# Patient Record
Sex: MALE | Race: WHITE | Employment: OTHER | URBAN - METROPOLITAN AREA
[De-identification: names, ages, dates, MRNs, and addresses within clinical notes are randomized per-mention and may not be internally consistent; named-entity substitution may affect disease eponyms.]

---

## 2018-07-12 ENCOUNTER — TRANSFERRED RECORDS (OUTPATIENT)
Dept: HEALTH INFORMATION MANAGEMENT | Facility: CLINIC | Age: 83
End: 2018-07-12

## 2018-07-12 LAB
ALT SERPL-CCNC: 146 (ref ?–7)
AST SERPL-CCNC: 47 U/L (ref 5–34)
CREAT SERPL-MCNC: 1.8 MG/DL (ref 0.6–1.3)
GFR SERPL CREATININE-BSD FRML MDRD: 36 ML/MIN/1.73M2
GLUCOSE SERPL-MCNC: 128 MG/DL (ref 70–99)

## 2018-09-06 ENCOUNTER — TRANSFERRED RECORDS (OUTPATIENT)
Dept: HEALTH INFORMATION MANAGEMENT | Facility: CLINIC | Age: 83
End: 2018-09-06

## 2018-09-11 LAB — TSH SERPL-ACNC: 2.61 UIU/ML (ref 0.49–4.67)

## 2018-09-21 ENCOUNTER — TRANSFERRED RECORDS (OUTPATIENT)
Dept: HEALTH INFORMATION MANAGEMENT | Facility: CLINIC | Age: 83
End: 2018-09-21

## 2018-09-21 LAB
ALT SERPL-CCNC: 53 U/L (ref 7–55)
AST SERPL-CCNC: 40 U/L (ref 5–34)
CREAT SERPL-MCNC: 1.3 MG/DL (ref 0.6–1.3)
GFR SERPL CREATININE-BSD FRML MDRD: 53 ML/MIN/1.73M2
GLUCOSE SERPL-MCNC: 101 MG/DL (ref 70–99)
POTASSIUM SERPL-SCNC: 4.4 MMOL/L (ref 3.6–5.5)

## 2018-09-24 ENCOUNTER — TRANSFERRED RECORDS (OUTPATIENT)
Dept: HEALTH INFORMATION MANAGEMENT | Facility: CLINIC | Age: 83
End: 2018-09-24

## 2018-10-02 ENCOUNTER — TRANSFERRED RECORDS (OUTPATIENT)
Dept: HEALTH INFORMATION MANAGEMENT | Facility: CLINIC | Age: 83
End: 2018-10-02

## 2018-10-05 ENCOUNTER — CARE COORDINATION (OUTPATIENT)
Dept: GASTROENTEROLOGY | Facility: CLINIC | Age: 83
End: 2018-10-05

## 2018-10-05 ENCOUNTER — MEDICAL CORRESPONDENCE (OUTPATIENT)
Dept: HEALTH INFORMATION MANAGEMENT | Facility: CLINIC | Age: 83
End: 2018-10-05

## 2018-10-05 ENCOUNTER — TRANSFERRED RECORDS (OUTPATIENT)
Dept: HEALTH INFORMATION MANAGEMENT | Facility: CLINIC | Age: 83
End: 2018-10-05

## 2018-10-05 ENCOUNTER — TELEPHONE (OUTPATIENT)
Dept: GASTROENTEROLOGY | Facility: CLINIC | Age: 83
End: 2018-10-05

## 2018-10-05 DIAGNOSIS — K86.89 PANCREATIC MASS: Primary | ICD-10-CM

## 2018-10-05 NOTE — PROGRESS NOTES
Advanced Endoscopy     Referring provider:  Dr. Jesus Alberto Nova with [facility]     Clinic contact - MARY Kelly [phone]    Referred to: Advanced Endoscopy Provider Group     Provider Requested: Any     Referral Received: 10/5/2018     Records received: 10/5/2018     Images received: NO images received.     Evaluation for:  Spyglass ERCP for Tissue Diagnosis / Stenting. Abnormal finding in image.      Clinical History (per RN review):   83-year-old with a history of night sweats, elevated LFTs, elevated alk phos level, essential hypertension.  9/21/2018: Annual physical with Dr. Blayne Wei  -Reported recurring episodes of chills and sweats which he has been seen for a couple of times over the past year.  The last episode of night sweats lasted for 5-6 days.  The sweats were associated with some lower chest pain, along with some pain into the back on both sides.  Labs were remarkable for elevated LFTs, especially the alk phos.  Transaminases are just mildly up.  He is status post cholecystectomy from 1990s.    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  Ampulla appeared to be bulging.  Despite multiple cannulation attempts, angling sphincterotome cephalad, and needle-knife sphincterotomy and could not be cannulated.  The endoscope was withdrawn.  The patient tolerated procedure well without complications.    Recommendations::  Refer to tertiary center for spyglass ERCP for tissue diagnosis/stenting.    10/2/2018 MRCP W WO contrast (Gad[facility]) - no images   Impression:  1.  Central obstructing mass at the confluence of the right and left hepatic ducts most concerning for cholangiocarcinoma.  2.  Intrahepatic bile duct dilation, left greater than right, there is associated hyperenhancement of the left hepatic lobe, likely inflammatory in the setting of biliary obstruction.  3.   Peripherally enhancing lesion hepatic segment IVB this could be enhancement surrounding a cyst or small biliary abscess.  Follow-up as needed to exclude underlying malignancy.  Small enhancing exophytic right renal lesion, I will small renal cell carcinoma is possible.  5.  Indeterminant subcapsular hypoenhancement in the peripheral right lobe, this could represent tiny subcapsular cyst, attention on follow-up is recommended.  6.  Tiny right hepatic lobe cyst.  7.  Enlarged portal lymph nodes, indeterminate.  8.  Left lower lobe pulmonary nodule.  9.  Moderate celiac, superior mesenteric and severe bilateral renal artery narrowing.  10.  Bilateral renal cysts.  11.  Prostatomegaly.    9/28/2018: MRCP without contrast (do not have images)  Findings: Abdominal MRCP demonstrates diffuse intrahepatic ductal dilation with abrupt termination at the bifurcation of the common hepatic duct.  No intraluminal filling defects appreciated.  In the absence of discrete mass or abnormal contrast enhancement, findings are suspicious for cholangiocarcinoma or Klatskin tumor.  Moderately enlarged josel uis hepatis and peripancreatic nodes are of unclear clinical significance.  A larger node along the anterior aspect of the pancreatic body measures 2.7 x 1.1 cm.  The liver, pancreas, spleen, adrenal glands, and kidneys are otherwise unremarkable except for a 9 mm left renal cyst.  A 1.1 cm nodule lesion is noted in the left lung base.    Impression:  1.  Diffuse intrahepatic ductal dilation with abrupt termination of the common hepatic duct bifurcation suspicious for Klatskin tumor.  ERCP with brushings recommended.  Abdominal MRI with and without contrast may be considered with delayed postcontrast sequences for demonstration of possible cholangiocarcinoma.  2.  A 1.1 cm nodular lesion is noted in the left lung base medially, recommend further characterization with thoracic computed tomography scan.      MD review date: 10/5/2018 routed  to Dr. Barraza as an urgent referral.   MD Decision for clinic consultation/Orders:   Plan: Accession outside MRCP MRI and request stat overread; then morning of procedure CT with IV contrast if renal function allows; then ERCP   See if I have room no later than next Tuesday; if I do not then let me know and we will figure something out  Per review of MRI in Patient images:    1. EUS/ERCP and over read of MRI's     Orders placed and sent to scheduling.        Referral updates/Patient contacted:   10/5/2018 1515: called referring office to ask where images are for this patient as intake states they were not pushed. Neither the nurse or the MD were in the office. Direct line left for them to call this RN.   10/8/2018: called referring office and left message for nurse to call this RN asap as this is an urgent referral and no images have been received.   - will need MRI sent for review.     Called and spoke to Tim and his wife.     Patient called and is amenable to plan: EUS/ERCP with Dr. Barraza and aware of the following:  Procedure explained to patient.  This is an /urgent procedure.     Can expect a call for date and time for procedure.   Will need a , someone to stay with them for 24 hours and stay in town for 24 hours (within 45 min of Hospital) post procedure, rational explained.   He had physical dated 9/23/2018 with Dr. Blayne Wei. Noted in EPIC.     Blood thinner -  N/A  ASA - N/A  Diabetic - N/A  NSAIDS: N/A    A pre-op nurse will call 1-2 days prior to the procedure.   Is advised to be NPO/solid food at midnight before the procedure in the event the procedure time is moved up. Ok to drink clear liquids (Water, Apple Juice or Gatorade) up to 6 hours prior to procedure.     Verbalized understanding of all instructions. All questions answered.   Contact information verified for future questions/concerns.       Sharon ROSENBERG, RN Coordinator  Dr. Bella, Dr. Barraza & Dr. Agosto    Advanced Endoscopy  [phone]

## 2018-10-05 NOTE — TELEPHONE ENCOUNTER
Health Call Center    Phone Message    May a detailed message be left on voicemail:   Unknown     Reason for Call: Other: Advanced Endoscopy Procedure Intake Form    Referring/Requesting provider and Health care System: Dr. Jesus Alberto Nova with [facility]    Clinic contact - MARY Kelly [phone]    Procedure Requested: Spyglass ERCP for Tissue Diagnosis / Stenting. Abnormal finding in image.     Is this an urgent referral 72 hours to 1 week: Yes    Requested provided:  N/A     Is this a procedure the provider does?    Yes    Has patient been evaluated in clinic or had a procedure Advance Endoscopy provider in the last 5 years:      Unknown    What is the procedure to evaluate for/Reason/Indication of procedure: Abnormal finding in image. Cholangiocarcinoma, Elevated alkaline phosphatase leven and Bile duct obstruction, intrahepatic.    Is procedure to rule out malignancy or is there concern for underlying malignancy (if yes, send messages as High priority):     Unknown    History and physical within last 30 days?     Unknown    CT scan     Unknown    MRI        Yes    Upper Endoscopy/EGD    Unknown    Endoscopic Ultrasound/EUS          Unknown    ERCP      Yes    Colonoscopy    Unknown    Are images able/being pushed to our system?     No    Was clinic informed to [employer] Urgent referral imaging overnight?(requested within 1 week)     No    Is patient is aware of request for procedure and ok to be contacted to schedule?    Unknown    Action Taken: Message routed to:  [facility] ANNAMARIE

## 2018-10-07 NOTE — PROGRESS NOTES
Interesting that they are not referring for cannulation and further tissue sampling (rather than just tissue sampling) as they failed cannulation    Plan: Accession outside MRCP MRI and request stat overread; then morning of procedure CT with IV contrast if renal function allows; then ERCP    See if I have room no later than next Tuesday; if I do not then let me know and we will figure something out    Bravo Barraza MD PhD  Director of Endoscopy  Associate Professor of Medicine, Surgery and Pediatrics  Interventional and Therapeutic Endoscopy    [facility]  Division of Gastroenterology and Hepatology  [facility] [address]  [address] [address]    New Consultations  [phone]  Procedure Scheduling [phone]  Clinical Nurse Coordinator [phone]  Clinical Fax   [phone]  Administrative   [phone]  Administrative Fax  [phone]

## 2018-10-08 ENCOUNTER — TELEPHONE (OUTPATIENT)
Dept: GASTROENTEROLOGY | Facility: CLINIC | Age: 83
End: 2018-10-08

## 2018-10-08 ENCOUNTER — HOSPITAL ENCOUNTER (INPATIENT)
Dept: GENERAL RADIOLOGY | Facility: CLINIC | Age: 83
End: 2018-10-08
Attending: INTERNAL MEDICINE

## 2018-10-08 ENCOUNTER — ANESTHESIA EVENT (OUTPATIENT)
Dept: SURGERY | Facility: CLINIC | Age: 83
End: 2018-10-08
Payer: MEDICARE

## 2018-10-08 RX ORDER — METOPROLOL SUCCINATE 100 MG/1
50 TABLET, EXTENDED RELEASE ORAL 2 TIMES DAILY
COMMUNITY

## 2018-10-08 RX ORDER — AMLODIPINE BESYLATE 5 MG/1
5 TABLET ORAL DAILY
COMMUNITY

## 2018-10-08 RX ORDER — PANTOPRAZOLE SODIUM 40 MG/1
40 TABLET, DELAYED RELEASE ORAL
COMMUNITY

## 2018-10-08 RX ORDER — FUROSEMIDE 20 MG
20 TABLET ORAL
COMMUNITY

## 2018-10-08 RX ORDER — LOSARTAN POTASSIUM 100 MG/1
100 TABLET ORAL DAILY
COMMUNITY

## 2018-10-08 NOTE — TELEPHONE ENCOUNTER
Tim is informed that he is scheduled on 10/09/2018 at 220 P with an arrival time of 1220 P for an ERCP/EUS procedure with Dr. Barraza.  All instructions along with lodging information will be sent via E-mail to alma [place] 10/08/2018 @ 105 A

## 2018-10-08 NOTE — ANESTHESIA PREPROCEDURE EVALUATION
Anesthesia Evaluation     . Pt has had prior anesthetic. Type: General    No history of anesthetic complications          ROS/MED HX    ENT/Pulmonary:  - neg pulmonary ROS     Neurologic:  - neg neurologic ROS     Cardiovascular:     (+) hypertension----. : . . . :. .       METS/Exercise Tolerance:     Hematologic:  - neg hematologic  ROS       Musculoskeletal:  - neg musculoskeletal ROS       GI/Hepatic:     (+) GERD Asymptomatic on medication,       Renal/Genitourinary:     (+) chronic renal disease, type: CRI,       Endo:  - neg endo ROS       Psychiatric:         Infectious Disease:  - neg infectious disease ROS       Malignancy:   (+) Malignancy History of GI  GI CA Active status post,         Other:    - neg other ROS                 Physical Exam  Normal systems: cardiovascular, pulmonary and dental    Airway   Mallampati: II  TM distance: >3 FB  Neck ROM: full    Dental     Cardiovascular       Pulmonary                     Anesthesia Plan      History & Physical Review  History and physical reviewed and following examination; no interval change.    ASA Status:  2 .    NPO Status:  > 8 hours    Plan for General and RSI with Intravenous and Propofol induction. Maintenance will be Balanced.    PONV prophylaxis:  Ondansetron (or other 5HT-3) and Dexamethasone or Solumedrol       Postoperative Care  Postoperative pain management:  IV analgesics and Oral pain medications.      Consents  Anesthetic plan, risks, benefits and alternatives discussed with:  Patient, Spouse and Daughter/Son.  Use of blood products discussed: No .   .                          .

## 2018-10-09 ENCOUNTER — ANESTHESIA (OUTPATIENT)
Dept: SURGERY | Facility: CLINIC | Age: 83
End: 2018-10-09
Payer: MEDICARE

## 2018-10-09 ENCOUNTER — HOSPITAL ENCOUNTER (OUTPATIENT)
Facility: CLINIC | Age: 83
Setting detail: OBSERVATION
Discharge: HOME | End: 2018-10-10
Attending: INTERNAL MEDICINE | Admitting: INTERNAL MEDICINE
Payer: MEDICARE

## 2018-10-09 ENCOUNTER — APPOINTMENT (OUTPATIENT)
Dept: GENERAL RADIOLOGY | Facility: CLINIC | Age: 83
End: 2018-10-09
Attending: INTERNAL MEDICINE
Payer: MEDICARE

## 2018-10-09 DIAGNOSIS — K83.1 BILE DUCT STRICTURE (H): Primary | ICD-10-CM

## 2018-10-09 PROBLEM — Z98.890 POST-OPERATIVE STATE: Status: ACTIVE | Noted: 2018-10-09

## 2018-10-09 LAB
ALBUMIN SERPL-MCNC: 3.3 G/DL (ref 3.4–5)
ALP SERPL-CCNC: 275 U/L (ref 40–150)
ALT SERPL W P-5'-P-CCNC: 34 U/L (ref 0–70)
AMYLASE SERPL-CCNC: 62 U/L (ref 30–110)
ANION GAP SERPL CALCULATED.3IONS-SCNC: 8 MMOL/L (ref 3–14)
AST SERPL W P-5'-P-CCNC: 24 U/L (ref 0–45)
BILIRUB SERPL-MCNC: 0.6 MG/DL (ref 0.2–1.3)
BUN SERPL-MCNC: 14 MG/DL (ref 7–30)
CALCIUM SERPL-MCNC: 9.3 MG/DL (ref 8.5–10.1)
CHLORIDE SERPL-SCNC: 101 MMOL/L (ref 94–109)
CO2 SERPL-SCNC: 28 MMOL/L (ref 20–32)
CREAT SERPL-MCNC: 1.29 MG/DL (ref 0.66–1.25)
ERCP: NORMAL
ERYTHROCYTE [DISTWIDTH] IN BLOOD BY AUTOMATED COUNT: 13.6 % (ref 10–15)
GFR SERPL CREATININE-BSD FRML MDRD: 53 ML/MIN/1.7M2
GLUCOSE BLDC GLUCOMTR-MCNC: 89 MG/DL (ref 70–99)
GLUCOSE SERPL-MCNC: 105 MG/DL (ref 70–99)
HCT VFR BLD AUTO: 39.8 % (ref 40–53)
HGB BLD-MCNC: 13.2 G/DL (ref 13.3–17.7)
LIPASE SERPL-CCNC: 100 U/L (ref 73–393)
MCH RBC QN AUTO: 32.4 PG (ref 26.5–33)
MCHC RBC AUTO-ENTMCNC: 33.2 G/DL (ref 31.5–36.5)
MCV RBC AUTO: 98 FL (ref 78–100)
PLATELET # BLD AUTO: 285 10E9/L (ref 150–450)
POTASSIUM SERPL-SCNC: 4.3 MMOL/L (ref 3.4–5.3)
PROT SERPL-MCNC: 7.9 G/DL (ref 6.8–8.8)
RBC # BLD AUTO: 4.07 10E12/L (ref 4.4–5.9)
SODIUM SERPL-SCNC: 137 MMOL/L (ref 133–144)
WBC # BLD AUTO: 9 10E9/L (ref 4–11)

## 2018-10-09 PROCEDURE — 37000009 ZZH ANESTHESIA TECHNICAL FEE, EACH ADDTL 15 MIN: Performed by: INTERNAL MEDICINE

## 2018-10-09 PROCEDURE — 27210794 ZZH OR GENERAL SUPPLY STERILE: Performed by: INTERNAL MEDICINE

## 2018-10-09 PROCEDURE — 25000125 ZZHC RX 250: Performed by: INTERNAL MEDICINE

## 2018-10-09 PROCEDURE — C1877 STENT, NON-COAT/COV W/O DEL: HCPCS | Performed by: INTERNAL MEDICINE

## 2018-10-09 PROCEDURE — G0378 HOSPITAL OBSERVATION PER HR: HCPCS

## 2018-10-09 PROCEDURE — 00000146 ZZHCL STATISTIC GLUCOSE BY METER IP

## 2018-10-09 PROCEDURE — 71000014 ZZH RECOVERY PHASE 1 LEVEL 2 FIRST HR: Performed by: INTERNAL MEDICINE

## 2018-10-09 PROCEDURE — 40000277 XR SURGERY CARM FLUORO LESS THAN 5 MIN W STILLS: Mod: TC

## 2018-10-09 PROCEDURE — C1769 GUIDE WIRE: HCPCS | Performed by: INTERNAL MEDICINE

## 2018-10-09 PROCEDURE — 25000132 ZZH RX MED GY IP 250 OP 250 PS 637: Mod: GY | Performed by: PHYSICIAN ASSISTANT

## 2018-10-09 PROCEDURE — 25000128 H RX IP 250 OP 636: Performed by: STUDENT IN AN ORGANIZED HEALTH CARE EDUCATION/TRAINING PROGRAM

## 2018-10-09 PROCEDURE — 83690 ASSAY OF LIPASE: CPT | Performed by: INTERNAL MEDICINE

## 2018-10-09 PROCEDURE — A9270 NON-COVERED ITEM OR SERVICE: HCPCS | Mod: GY | Performed by: PHYSICIAN ASSISTANT

## 2018-10-09 PROCEDURE — 85027 COMPLETE CBC AUTOMATED: CPT | Performed by: INTERNAL MEDICINE

## 2018-10-09 PROCEDURE — 25000128 H RX IP 250 OP 636: Performed by: NURSE ANESTHETIST, CERTIFIED REGISTERED

## 2018-10-09 PROCEDURE — 80053 COMPREHEN METABOLIC PANEL: CPT | Performed by: INTERNAL MEDICINE

## 2018-10-09 PROCEDURE — 99225 ZZC SUBSEQUENT OBSERVATION CARE,LEVEL II: CPT | Performed by: PHYSICIAN ASSISTANT

## 2018-10-09 PROCEDURE — A9270 NON-COVERED ITEM OR SERVICE: HCPCS | Performed by: INTERNAL MEDICINE

## 2018-10-09 PROCEDURE — 25000125 ZZHC RX 250: Performed by: NURSE ANESTHETIST, CERTIFIED REGISTERED

## 2018-10-09 PROCEDURE — 88112 CYTOPATH CELL ENHANCE TECH: CPT | Performed by: INTERNAL MEDICINE

## 2018-10-09 PROCEDURE — 71000015 ZZH RECOVERY PHASE 1 LEVEL 2 EA ADDTL HR: Performed by: INTERNAL MEDICINE

## 2018-10-09 PROCEDURE — 40000170 ZZH STATISTIC PRE-PROCEDURE ASSESSMENT II: Performed by: INTERNAL MEDICINE

## 2018-10-09 PROCEDURE — 25000128 H RX IP 250 OP 636: Performed by: ANESTHESIOLOGY

## 2018-10-09 PROCEDURE — 36415 COLL VENOUS BLD VENIPUNCTURE: CPT | Performed by: INTERNAL MEDICINE

## 2018-10-09 PROCEDURE — 37000008 ZZH ANESTHESIA TECHNICAL FEE, 1ST 30 MIN: Performed by: INTERNAL MEDICINE

## 2018-10-09 PROCEDURE — 25000566 ZZH SEVOFLURANE, EA 15 MIN: Performed by: INTERNAL MEDICINE

## 2018-10-09 PROCEDURE — C1726 CATH, BAL DIL, NON-VASCULAR: HCPCS | Performed by: INTERNAL MEDICINE

## 2018-10-09 PROCEDURE — C1876 STENT, NON-COA/NON-COV W/DEL: HCPCS | Performed by: INTERNAL MEDICINE

## 2018-10-09 PROCEDURE — 82150 ASSAY OF AMYLASE: CPT | Performed by: INTERNAL MEDICINE

## 2018-10-09 PROCEDURE — 88305 TISSUE EXAM BY PATHOLOGIST: CPT | Performed by: INTERNAL MEDICINE

## 2018-10-09 PROCEDURE — 99207 ZZC CDG-CODE CATEGORY CHANGED: CPT | Performed by: PHYSICIAN ASSISTANT

## 2018-10-09 PROCEDURE — 36000059 ZZH SURGERY LEVEL 3 EA 15 ADDTL MIN UMMC: Performed by: INTERNAL MEDICINE

## 2018-10-09 PROCEDURE — 25000125 ZZHC RX 250: Performed by: STUDENT IN AN ORGANIZED HEALTH CARE EDUCATION/TRAINING PROGRAM

## 2018-10-09 PROCEDURE — 36000061 ZZH SURGERY LEVEL 3 W FLUORO 1ST 30 MIN - UMMC: Performed by: INTERNAL MEDICINE

## 2018-10-09 DEVICE — STENT JOHLIN PANCREA WEDGE 08.5FRX20CM WINTRO G26831
Type: IMPLANTABLE DEVICE | Site: BILE DUCT | Status: NON-FUNCTIONAL
Removed: 2018-11-05

## 2018-10-09 DEVICE — STENT JOHLIN PANCREA WEDGE 08.5FRX22CM WINTRO G26832
Type: IMPLANTABLE DEVICE | Site: BILE DUCT | Status: NON-FUNCTIONAL
Removed: 2018-11-05

## 2018-10-09 DEVICE — STENT FREEMAN PANCREA FLEX 4FRX11CM W/O FLANGE SGL PIGTAIL
Type: IMPLANTABLE DEVICE | Site: PANCREAS | Status: NON-FUNCTIONAL
Removed: 2018-11-05

## 2018-10-09 RX ORDER — LOSARTAN POTASSIUM 100 MG/1
100 TABLET ORAL DAILY
Status: DISCONTINUED | OUTPATIENT
Start: 2018-10-09 | End: 2018-10-10 | Stop reason: HOSPADM

## 2018-10-09 RX ORDER — SODIUM CHLORIDE, SODIUM LACTATE, POTASSIUM CHLORIDE, CALCIUM CHLORIDE 600; 310; 30; 20 MG/100ML; MG/100ML; MG/100ML; MG/100ML
INJECTION, SOLUTION INTRAVENOUS CONTINUOUS PRN
Status: DISCONTINUED | OUTPATIENT
Start: 2018-10-09 | End: 2018-10-09

## 2018-10-09 RX ORDER — ACETAMINOPHEN 325 MG/1
650 TABLET ORAL EVERY 4 HOURS PRN
Status: DISCONTINUED | OUTPATIENT
Start: 2018-10-09 | End: 2018-10-10 | Stop reason: HOSPADM

## 2018-10-09 RX ORDER — FLUMAZENIL 0.1 MG/ML
0.2 INJECTION, SOLUTION INTRAVENOUS
Status: ACTIVE | OUTPATIENT
Start: 2018-10-09 | End: 2018-10-10

## 2018-10-09 RX ORDER — ONDANSETRON 4 MG/1
4 TABLET, ORALLY DISINTEGRATING ORAL EVERY 6 HOURS PRN
Status: DISCONTINUED | OUTPATIENT
Start: 2018-10-09 | End: 2018-10-10 | Stop reason: HOSPADM

## 2018-10-09 RX ORDER — ONDANSETRON 2 MG/ML
4 INJECTION INTRAMUSCULAR; INTRAVENOUS EVERY 30 MIN PRN
Status: DISCONTINUED | OUTPATIENT
Start: 2018-10-09 | End: 2018-10-09 | Stop reason: HOSPADM

## 2018-10-09 RX ORDER — CIPROFLOXACIN 2 MG/ML
INJECTION, SOLUTION INTRAVENOUS PRN
Status: DISCONTINUED | OUTPATIENT
Start: 2018-10-09 | End: 2018-10-09

## 2018-10-09 RX ORDER — AMLODIPINE BESYLATE 2.5 MG/1
5 TABLET ORAL DAILY
Status: DISCONTINUED | OUTPATIENT
Start: 2018-10-09 | End: 2018-10-10 | Stop reason: HOSPADM

## 2018-10-09 RX ORDER — ONDANSETRON 4 MG/1
4 TABLET, ORALLY DISINTEGRATING ORAL EVERY 30 MIN PRN
Status: DISCONTINUED | OUTPATIENT
Start: 2018-10-09 | End: 2018-10-09 | Stop reason: HOSPADM

## 2018-10-09 RX ORDER — FENTANYL CITRATE 50 UG/ML
INJECTION, SOLUTION INTRAMUSCULAR; INTRAVENOUS PRN
Status: DISCONTINUED | OUTPATIENT
Start: 2018-10-09 | End: 2018-10-09

## 2018-10-09 RX ORDER — PANTOPRAZOLE SODIUM 40 MG/1
40 TABLET, DELAYED RELEASE ORAL
Status: DISCONTINUED | OUTPATIENT
Start: 2018-10-10 | End: 2018-10-10 | Stop reason: HOSPADM

## 2018-10-09 RX ORDER — NALOXONE HYDROCHLORIDE 0.4 MG/ML
.1-.4 INJECTION, SOLUTION INTRAMUSCULAR; INTRAVENOUS; SUBCUTANEOUS
Status: DISCONTINUED | OUTPATIENT
Start: 2018-10-09 | End: 2018-10-10 | Stop reason: HOSPADM

## 2018-10-09 RX ORDER — FUROSEMIDE 20 MG
20 TABLET ORAL
Status: DISCONTINUED | OUTPATIENT
Start: 2018-10-10 | End: 2018-10-10 | Stop reason: HOSPADM

## 2018-10-09 RX ORDER — METOPROLOL SUCCINATE 50 MG/1
50 TABLET, EXTENDED RELEASE ORAL 2 TIMES DAILY
Status: DISCONTINUED | OUTPATIENT
Start: 2018-10-09 | End: 2018-10-10 | Stop reason: HOSPADM

## 2018-10-09 RX ORDER — FENTANYL CITRATE 50 UG/ML
25-50 INJECTION, SOLUTION INTRAMUSCULAR; INTRAVENOUS EVERY 5 MIN PRN
Status: DISCONTINUED | OUTPATIENT
Start: 2018-10-09 | End: 2018-10-09 | Stop reason: HOSPADM

## 2018-10-09 RX ORDER — PROPOFOL 10 MG/ML
INJECTION, EMULSION INTRAVENOUS PRN
Status: DISCONTINUED | OUTPATIENT
Start: 2018-10-09 | End: 2018-10-09

## 2018-10-09 RX ORDER — LIDOCAINE 40 MG/G
CREAM TOPICAL
Status: DISCONTINUED | OUTPATIENT
Start: 2018-10-09 | End: 2018-10-09 | Stop reason: HOSPADM

## 2018-10-09 RX ORDER — LIDOCAINE HYDROCHLORIDE 20 MG/ML
INJECTION, SOLUTION INFILTRATION; PERINEURAL PRN
Status: DISCONTINUED | OUTPATIENT
Start: 2018-10-09 | End: 2018-10-09

## 2018-10-09 RX ORDER — ONDANSETRON 2 MG/ML
4 INJECTION INTRAMUSCULAR; INTRAVENOUS EVERY 6 HOURS PRN
Status: DISCONTINUED | OUTPATIENT
Start: 2018-10-09 | End: 2018-10-10 | Stop reason: HOSPADM

## 2018-10-09 RX ORDER — BISACODYL 5 MG
10 TABLET, DELAYED RELEASE (ENTERIC COATED) ORAL DAILY PRN
Status: DISCONTINUED | OUTPATIENT
Start: 2018-10-09 | End: 2018-10-10 | Stop reason: HOSPADM

## 2018-10-09 RX ORDER — DEXAMETHASONE SODIUM PHOSPHATE 4 MG/ML
INJECTION, SOLUTION INTRA-ARTICULAR; INTRALESIONAL; INTRAMUSCULAR; INTRAVENOUS; SOFT TISSUE PRN
Status: DISCONTINUED | OUTPATIENT
Start: 2018-10-09 | End: 2018-10-09

## 2018-10-09 RX ORDER — HYDROMORPHONE HYDROCHLORIDE 1 MG/ML
.3-.5 INJECTION, SOLUTION INTRAMUSCULAR; INTRAVENOUS; SUBCUTANEOUS EVERY 10 MIN PRN
Status: DISCONTINUED | OUTPATIENT
Start: 2018-10-09 | End: 2018-10-09 | Stop reason: HOSPADM

## 2018-10-09 RX ORDER — INDOMETHACIN 50 MG/1
100 SUPPOSITORY RECTAL
Status: DISCONTINUED | OUTPATIENT
Start: 2018-10-09 | End: 2018-10-09 | Stop reason: HOSPADM

## 2018-10-09 RX ORDER — BISACODYL 5 MG
5 TABLET, DELAYED RELEASE (ENTERIC COATED) ORAL DAILY PRN
Status: DISCONTINUED | OUTPATIENT
Start: 2018-10-09 | End: 2018-10-10 | Stop reason: HOSPADM

## 2018-10-09 RX ORDER — NALOXONE HYDROCHLORIDE 0.4 MG/ML
.1-.4 INJECTION, SOLUTION INTRAMUSCULAR; INTRAVENOUS; SUBCUTANEOUS
Status: DISCONTINUED | OUTPATIENT
Start: 2018-10-09 | End: 2018-10-09 | Stop reason: HOSPADM

## 2018-10-09 RX ORDER — SODIUM CHLORIDE, SODIUM LACTATE, POTASSIUM CHLORIDE, CALCIUM CHLORIDE 600; 310; 30; 20 MG/100ML; MG/100ML; MG/100ML; MG/100ML
INJECTION, SOLUTION INTRAVENOUS CONTINUOUS
Status: DISCONTINUED | OUTPATIENT
Start: 2018-10-09 | End: 2018-10-09 | Stop reason: HOSPADM

## 2018-10-09 RX ORDER — ACETAMINOPHEN 650 MG/1
650 SUPPOSITORY RECTAL EVERY 4 HOURS PRN
Status: DISCONTINUED | OUTPATIENT
Start: 2018-10-09 | End: 2018-10-10 | Stop reason: HOSPADM

## 2018-10-09 RX ORDER — ONDANSETRON 2 MG/ML
INJECTION INTRAMUSCULAR; INTRAVENOUS PRN
Status: DISCONTINUED | OUTPATIENT
Start: 2018-10-09 | End: 2018-10-09

## 2018-10-09 RX ORDER — FENTANYL CITRATE 50 UG/ML
25-50 INJECTION, SOLUTION INTRAMUSCULAR; INTRAVENOUS
Status: DISCONTINUED | OUTPATIENT
Start: 2018-10-09 | End: 2018-10-09 | Stop reason: HOSPADM

## 2018-10-09 RX ORDER — BISACODYL 5 MG
15 TABLET, DELAYED RELEASE (ENTERIC COATED) ORAL DAILY PRN
Status: DISCONTINUED | OUTPATIENT
Start: 2018-10-09 | End: 2018-10-10 | Stop reason: HOSPADM

## 2018-10-09 RX ADMIN — DEXAMETHASONE SODIUM PHOSPHATE 8 MG: 4 INJECTION, SOLUTION INTRA-ARTICULAR; INTRALESIONAL; INTRAMUSCULAR; INTRAVENOUS; SOFT TISSUE at 14:38

## 2018-10-09 RX ADMIN — ONDANSETRON 4 MG: 2 INJECTION INTRAMUSCULAR; INTRAVENOUS at 15:52

## 2018-10-09 RX ADMIN — LIDOCAINE HYDROCHLORIDE 80 MG: 20 INJECTION, SOLUTION INFILTRATION; PERINEURAL at 14:38

## 2018-10-09 RX ADMIN — ROCURONIUM BROMIDE 10 MG: 10 INJECTION INTRAVENOUS at 15:33

## 2018-10-09 RX ADMIN — AMLODIPINE BESYLATE 5 MG: 2.5 TABLET ORAL at 20:12

## 2018-10-09 RX ADMIN — ACETAMINOPHEN 650 MG: 325 TABLET, FILM COATED ORAL at 20:12

## 2018-10-09 RX ADMIN — FENTANYL CITRATE 100 MCG: 50 INJECTION, SOLUTION INTRAMUSCULAR; INTRAVENOUS at 14:38

## 2018-10-09 RX ADMIN — ROCURONIUM BROMIDE 50 MG: 10 INJECTION INTRAVENOUS at 14:38

## 2018-10-09 RX ADMIN — LOSARTAN POTASSIUM 100 MG: 100 TABLET ORAL at 20:12

## 2018-10-09 RX ADMIN — METOPROLOL SUCCINATE 50 MG: 50 TABLET, EXTENDED RELEASE ORAL at 20:13

## 2018-10-09 RX ADMIN — SODIUM CHLORIDE, POTASSIUM CHLORIDE, SODIUM LACTATE AND CALCIUM CHLORIDE: 600; 310; 30; 20 INJECTION, SOLUTION INTRAVENOUS at 14:30

## 2018-10-09 RX ADMIN — ONDANSETRON HYDROCHLORIDE 4 MG: 2 INJECTION INTRAMUSCULAR; INTRAVENOUS at 17:07

## 2018-10-09 RX ADMIN — CIPROFLOXACIN 400 MG: 2 INJECTION INTRAVENOUS at 15:00

## 2018-10-09 RX ADMIN — SUGAMMADEX 140 MG: 100 INJECTION, SOLUTION INTRAVENOUS at 16:21

## 2018-10-09 RX ADMIN — PROPOFOL 100 MG: 10 INJECTION, EMULSION INTRAVENOUS at 14:38

## 2018-10-09 NOTE — PROGRESS NOTES
SPIRITUAL HEALTH SERVICES  [facility] ([place] 3C    PRE-SURGERY VISIT    Had pre-surgery visit with patient, Tim Cazares, his wife Brea, and grandyaneth Sabianism.  Provided spiritual support, prayer.     Hector Burgess  Chaplain Resident  Pager [phone]

## 2018-10-09 NOTE — H&P
"[facility]    Internal Medicine History and Physical - Gold Service       Date of Admission:  10/9/2018    Assessment & Plan   Tim Cazares is a 83 year old male with a hx of CKD, GERD, and HTN who was admitted to observation post-op ERCP.    # POD 0 ERCP. 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 S/p ERCP today for decompression and sampling. Started on Ciprofloxacin IV post op.  - Continue Cipro x 5 days, transition to oral on discharge  - Dr. Barraza to communicate results of sampling to family when available  - CLD overnight  - CMP, CBC in am    HTN. PTA managed on amlodipine, losartan, lasix and metoprolol. BP stable post op.  - Continue PTA meds    CKD. Noted in chart, although only Creatinine available for review is from admission. Cr 1.29, GFR 53 on admission.   - Trend CMP    GERD. Continue pantoprazole, ranitidine.    Diet: Clear Liquid Diet No Caffeine  Fluids: PO intake  Bender Catheter: not present    DVT Prophylaxis: Pneumatic Compression Devices  Code Status: No Order    Expected discharge: Tomorrow, recommended to prior living arrangement once adequate pain management.    The patient's care was discussed with the Attending Physician, Dr. Rivera.    Emily Cotto PA-C  Internal Medicine Staff Hospitalist Service  [facility]  Pager: [phone]  Please see sticky note for cross cover information  ______________________________________________________________________    Chief Complaint   \"I feel good!\"    History of Present Illness   Tim Cazares is a 83 year old male with a hx of CKD, GERD, and HTN who was admitted to observation post-op ERCP. 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 Now s/p ERCP with stent placement and biliary stricture sampling. "     Seen at bedside following ERCP with many family members at bedside. No pain following procedure. Able to tolerate clear liquid diet. Denies abdominal pain, chest pain, fevers or chills. Hard of hearing. No further complaints at this time.     Review of Systems   The 10 point Review of Systems is negative other than noted in the HPI or here.     Past Medical History    I have reviewed this patient's medical history and updated it with pertinent information if needed.   Past Medical History:   Diagnosis Date     CKD (chronic kidney disease)      GERD (gastroesophageal reflux disease)      Hypertension         Past Surgical History   I have reviewed this patient's surgical history and updated it with pertinent information if needed.  Past Surgical History:   Procedure Laterality Date     CHOLECYSTECTOMY       HERNIA REPAIR       PROSTATE SURGERY          Social History   Social History   Substance Use Topics     Smoking status: Former Smoker     Smokeless tobacco: Never Used     Alcohol use Yes      Comment: social       Family History   I have reviewed this patient's family history and updated it with pertinent information if needed.   History reviewed. No pertinent family history.    Prior to Admission Medications   Prior to Admission Medications   Prescriptions Last Dose Informant Patient Reported? Taking?   AMLODIPINE BESYLATE PO 10/8/2018 at 0800  Yes Yes   Sig: Take 5 mg by mouth daily   FUROSEMIDE PO 10/8/2018 at 0800  Yes Yes   Sig: Take 20 mg by mouth M - W - friday   LOSARTAN POTASSIUM PO 10/8/2018 at 0800  Yes Yes   Sig: Take 100 mg by mouth daily   METOPROLOL SUCCINATE ER PO 10/9/2018 at 0800  Yes Yes   Sig: Take 50 mg by mouth 2 times daily   PANTOPRAZOLE SODIUM PO 10/9/2018 at 0800  Yes Yes   Sig: Take 40 mg by mouth every morning (before breakfast)   RaNITidine HCl (ZANTAC PO) 10/8/2018 at 2000  Yes Yes   Sig: Take 300 mg by mouth At Bedtime      Facility-Administered Medications: None     Allergies    Allergies   Allergen Reactions     Penicillin G      Strawberries [Strawberry]      Sulfa Drugs        Physical Exam   Vital Signs: Temp: 98.9  F (37.2  C) Temp src: Oral BP: 141/69 Pulse: 68 Heart Rate: 66 Resp: 13 SpO2: 93 % O2 Device: None (Room air) Oxygen Delivery: 2 LPM  Weight: 138 lbs .13 oz    General Appearance: Alert and oriented x 3, NAD  Eyes: PERRL  HEENT: head normocephalic, atraumatic, hard of hearing  Respiratory: Lungs CTAB, no wheezing or crackles  Cardiovascular: RRR, REHANA  GI: abdomen soft, non distended, non tender to palpation  Skin: warm and dry to touch, no rashes or excoriations  Musculoskeletal: no joint swelling or tenderness  Neurologic: moves all extremities  Psychiatric: mood stable    Data   Data reviewed today: I reviewed all medications, new labs and imaging results over the last 24 hours.      Recent Labs  Lab 10/09/18  1228   WBC 9.0   HGB 13.2*   MCV 98         POTASSIUM 4.3   CHLORIDE 101   CO2 28   BUN 14   CR 1.29*   ANIONGAP 8   LUZMARIA 9.3   *   ALBUMIN 3.3*   PROTTOTAL 7.9   BILITOTAL 0.6   ALKPHOS 275*   ALT 34   AST 24   LIPASE 100       Physician Attestation   I, Haja Rivera, saw and evaluated Tim Cazares as part of a shared visit.  I have reviewed and discussed with the advanced practice provider their history, physical and plan.    I personally reviewed the vital signs, medications, labs and imaging.    My key history or physical exam findings: Patient seen and examined today.  Post ERCP with mild post procedure epigastc pain.  No symptoms of fever, chills, rigors, sweats, or  N/V at the moment.      PE:   VS: Afebrile and stable  GEN: NAD  CV: RRR S1/S2, no m,r,g  Pulm: CTA b/l  Abd: soft, mild epigastric tenderness, ND, +BS  Ext: warm and well perfused, no edema    Key management decisions made by me: Mr Cazares is a pleasant 83 year old gentleman who presents post ERCP following lever failure elevation and a concerning aping biliary  stricture.  S/P decompression and sampling.  Will be admitted for observation overnight due to risk of sepsis following the procedure for IV antibiotics.  Will continue Cipro x 5 days if he does well, and will discharge home to await pathology.    Haja Rivera  Date of Service (when I saw the patient): 10/9/18

## 2018-10-09 NOTE — OP NOTE
ERCP 10/09/2018  2:31 PM [facility], [address] [place] [address] [phone]     Endoscopy Department   _______________________________________________________________________________   Patient Name: Tim Cazares         Procedure Date: 10/9/2018 2:31 PM   MRN: [id number]                       Account Number: [id number]   YOB: 1935               Admit Type: Outpatient   Age: 83                               Room: OR   Gender: Male                          Note Status: Finalized   Attending MD: Bravo Barraza MD   Total Sedation Time:   _______________________________________________________________________________       Procedure:           ERCP   Indications:         Abnormal MRCP, Elevated liver enzymes   Providers:           Brvao Barraza MD   Patient Profile:     Mr Cazares is an 84yo gentleman 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                        Attempts at decompression and sampling were not                        successful and he now proceeds to repeat attempt.   Referring MD:        Jesus Alberto Nova MD   Medicines:           Indomethacin not given due to contraindication, General                        Anesthesia, Cipro 400 mg IV   Complications:       No immediate complications.   _______________________________________________________________________________   Procedure:           Pre-Anesthesia Assessment:                        - Prior to the procedure, a History and Physical was                        performed, and patient medications and allergies were                        reviewed. The patient is competent. The risks and                        benefits of the procedure and the sedation options and                        risks were discussed with the patient. All questions                         were answered and informed consent was obtained. Patient                        identification and proposed procedure were verified by                        the nurse in the pre-procedure area. Mental Status                        Examination: alert and oriented. Airway Examination:                        Mallampati Class II (the uvula but not tonsillar pillars                        visualized). Respiratory Examination: clear to                        auscultation. CV Examination: systolic murmur. ASA Grade                        Assessment: II - A patient with mild systemic disease.                        After reviewing the risks and benefits, the patient was                        deemed in satisfactory condition to undergo the                        procedure. The anesthesia plan was to use general                        anesthesia. Immediately prior to administration of                        medications, the patient was re-assessed for adequacy to                        receive sedatives. The heart rate, respiratory rate,                        oxygen saturations, blood pressure, adequacy of                        pulmonary ventilation, and response to care were                        monitored throughout the procedure. The physical status                        of the patient was re-assessed after the procedure.                        After obtaining informed consent, the scope was passed                        under direct vision. Throughout the procedure, the                        patient's blood pressure, pulse, and oxygen saturations                        were monitored continuously. The duodenoscope was                        introduced through the mouth, and used to inject                        contrast into and used to inject contrast into the bile                        duct and ventral pancreatic duct. The ERCP was                        accomplished without difficulty. The patient  "tolerated                        the procedure well.                                                                                     Findings:         films of the abdomen demonstrated cholecysectomy clips. Limited        white light views of the foregut were notable for ulceation of the        ampulla. Initial attempts at cannulation led to the ventral pancreatic        duct being deeply cannulated with the short-nosed traction        sphincterotome in concert with an 0.025\" Visiglide wire. Contrast was        injected and I personally interpreted the pancreatic duct images. Ductal        flow of contrast was adequate, image quality was excellent and contrast        extended throughout the pancreatic duct which was essentially normal.        Attempts at dual wire cannulation were not successful and led to one        wire perforation. A 4 Fr by 11 cm Bella prophylactic stent with a        single external pigtail and no internal flaps was placed 11 cm into the        ventral pancreatic duct. Clear fluid flowed through the stent and the        stent was in good position. Next the bile duct was deeply cannulated        with the short-nosed traction sphincterotome and the wire. Contrast was        injected. There was moderately tight stenoses involving the bifurcation        and each of the primary insertions (Bismuth IV) suggestive of either        cholangiocarcinoma or extrinsic compression. Also there was mild to        moderate diffuse intrahepatic dilation and unremarkable        postcholecystectomy common downstream of the bifurcation. A 6 mm biliary        sphincterotomy was made with a monofilament traction (standard)        sphincterotome using ERBE electrocautery. There was no        post-sphincterotomy bleeding. The hepatic duct bifurcation was biopsied        with a cold forceps for histology. Cells for cytology were obtained by        brushing in the hepatic duct bifurcation. The bile duct was " explored        endoscopically using the [employer] direct visualization system. The        SpyScope was advanced to the bifurcation. Visibility with the scope was        excellent. Cholangioscopy demonstrated the stenosis, though suggested        extrinsic compression rather than intrinsic mass; this region was        Spybite biopsied as well. An 8.5 Fr by 22 cm transpapillary stent with        no external flaps and no internal flaps was placed 21 cm into the left        hepatic duct. An 8.5 Fr by 16 cm transpapillary stent with no external        flaps and no internal flaps was placed 15 cm into the right anterior        hepatic duct. Bile flowed through the stents and the stents were in good        position.                                                                                     Impression:          - Ulcerated ampulla, complicating cannulation, perhaps                        secondary to either associated malignancy or previous                        attempt at cannulation                        - Distorted anatomy prompted initial main pancreatic                        duct cannulation and stent placement; the main duct                        pancreatography was unremarkable                        - Moderately tight stenoses involving the bifurcation                        and each of the primary insertions (Bismuth IV)                        suggestive of either cholangiocarcinoma or extrinsic                        compression                        - Mild to moderate diffuse intrahepatic dilation and                        unremarkable postcholecystectomy common downstream of                        the bifurcation                        - Succesful biliary sphincterotomy                        - Successful biliary stricture sampling by over the wire                        forceps and Infinity brush                        - Cholangioscopy demonstrated the stenosis, though                         suggested extrinsic compression rather than intrinsic                        mass; this region was Spybite biopsied as well                        - Successful deployment of 8.5F Johlin stents deep to                        the left and right anterior   Recommendation:      - Standard general anesthesia recovery with registration                        to observation given combination of comorbidities,                        duration of anesthesia, risk of bacteremia                        - Dr Barraza to communicate results of sampling when                        available; if negative we should consider repeat imaging                        (pending renal function) and return in 2w for repeat                        sampling by EUS and ERCP, though transgastric direct                        sampling of the lesion would not be preferred due to                        theory of seeding; if sampling positive, return for                        exchange to metal stents in 8 weeks assuming plain film                        demonstrates spontaneous ejection of the pancreatic stent                        - Avoid antithrombotics for at least three days                        - Continue a short course of antibiotics (5 days); IV                        ciprofloxacin while admitted to oral on discharge                        - The findings and recommendations were discussed with                        the patient and their family                                                                                       electronically signed by DAISY Barraza

## 2018-10-09 NOTE — ANESTHESIA POSTPROCEDURE EVALUATION
Patient: Tim Cazares    Procedure(s):   Endoscopic Retrograde Cholangiopancreatogram, pancreatic stent placement, biliary sphincterotomy, cholangioscopy, brushings and biopsies, biliary stent placement - Wound Class: II-Clean Contaminated   - Wound Class: II-Clean Contaminated    Diagnosis:Pancreatic Mass   Diagnosis Additional Information: No value filed.    Anesthesia Type:  General, RSI    Note:  Anesthesia Post Evaluation    Patient location during evaluation: PACU  Patient participation: Able to fully participate in evaluation  Level of consciousness: awake and alert  Pain management: adequate  Airway patency: patent  Cardiovascular status: blood pressure returned to baseline and hemodynamically stable  Respiratory status: nasal cannula  Hydration status: acceptable  PONV: none     Anesthetic complications: None          Last vitals:  Vitals:    10/09/18 1745 10/09/18 1800 10/09/18 1824   BP: 143/71 146/66 141/69   Pulse:      Resp: 11 14 13   Temp: 36.7  C (98.1  F) 36.8  C (98.2  F) 37.2  C (98.9  F)   SpO2: 98% 96% 93%         Electronically Signed By: Reinaldo Gusman MD  October 9, 2018  6:42 PM

## 2018-10-09 NOTE — IP AVS SNAPSHOT
MRN:[id number]                      After Visit Summary   10/9/2018    Tim Cazares    MRN: [id number]           Thank you!     Thank you for choosing [facility] for your care. Our goal is always to provide you with excellent care. Hearing back from our patients is one way we can continue to improve our services. Please take a few minutes to complete the written survey that you may receive in the mail after you visit with us. Thank you!        Patient Information     Date Of Birth          1935        About your hospital stay     You were admitted on:  October 9, 2018 You last received care in the:  Unit 6D Observation [facility]    You were discharged on:  October 10, 2018        Reason for your hospital stay       Admitted for observation following ERCP. Hospital stay was uncomplicated.                  Who to Call     For medical emergencies, please call 911.  For non-urgent questions about your medical care, please call your primary care provider or clinic, [phone]  For questions related to your surgery, please call your surgery clinic        Attending Provider     Provider Specialty    Bravo Barraza MD Gastroenterology    RESIDENT, JUICEAN UNLICENSED --    Haja Rivera MD Internal Medicine       Primary Care Provider Office Phone # Fax #    Dmitriy Wei [phone]      After Care Instructions     Activity       Your activity upon discharge: activity as tolerated            Diet       Follow this diet upon discharge: Regular Diet Adult                  Follow-up Appointments     Adult [facility] Follow-up and recommended labs and tests       Follow up with Gastroenterology in 2 weeks for repeat EUS and ERCP, and again in 8 weeks for exchange of pancreatic stent.    Appointments on [place] and/or [facility] (with [facility] or [facility] provider or service). Call [phone] if you haven't heard regarding these appointments within 7 days  "of discharge.            Follow Up and recommended labs and tests       Follow up with primary care provider, Dmitriy Wei, within 7 days for hospital follow- up.  No follow up labs or test are needed.                  Pending Results     Date and Time Order Name Status Description    10/9/2018 1552 Cytology non gyn In process     10/9/2018 1541 Surgical pathology exam In process             Statement of Approval     Ordered          10/10/18 1142  I have reviewed and agree with all the recommendations and orders detailed in this document.  EFFECTIVE NOW     Approved and electronically signed by:  Dominik Ricci PA-C             Admission Information     Date & Time Provider Department Dept. Phone    10/9/2018 Haja Rivera MD Unit 6D Observation [facility] [phone]      Your Vitals Were     Blood Pressure Pulse Temperature Respirations Height Weight    146/71 (BP Location: Left arm) 68 98.3  F (36.8  C) (Oral) 16 1.626 m (5' 4\") 62.6 kg (138 lb 0.1 oz)    Pulse Oximetry BMI (Body Mass Index)                96% 23.69 kg/m2          [employer] Information     [employer] lets you send messages to your doctor, view your test results, renew your prescriptions, schedule appointments and more. To sign up, go to [email or web address] . Click on \"Log in\" on the left side of the screen, which will take you to the Welcome page. Then click on \"Sign up Now\" on the right side of the page.     You will be asked to enter the access code listed below, as well as some personal information. Please follow the directions to create your username and password.     Your access code is: [email or web address]  Expires: 2019 11:43 AM     Your access code will  in 90 days. If you need help or a new code, please call your [place] clinic or [phone].        Care EveryWhere ID     This is your Care EveryWhere ID. This could be used by other organizations to access your [place] medical records  [id number]      "   Equal Access to Services     [facility]: Hadii aad ku haddpieshjairo Chaudhari, waaxda luqadaha, qaybta kaalmasarah perdue, edwige al. So [facility] [phone].    ATENCIÓN: Si habla español, tiene a kim disposición servicios gratuitos de asistencia lingüística. Abnernataliya al [phone].    We comply with applicable federal civil rights laws and [place] laws. We do not discriminate on the basis of race, color, national origin, age, disability, sex, sexual orientation, or gender identity.               Review of your medicines      START taking        Dose / Directions    ciprofloxacin 500 MG tablet   Commonly known as:  CIPRO   Indication:  post-ERCP prophylaxis        Dose:  500 mg   Take 1 tablet (500 mg) by mouth every 12 hours   Quantity:  9 tablet   Refills:  0         CONTINUE these medicines which have NOT CHANGED        Dose / Directions    AMLODIPINE BESYLATE PO        Dose:  5 mg   Take 5 mg by mouth daily   Refills:  0       FUROSEMIDE PO        Dose:  20 mg   Take 20 mg by mouth M - W - friday   Refills:  0       LOSARTAN POTASSIUM PO        Dose:  100 mg   Take 100 mg by mouth daily   Refills:  0       METOPROLOL SUCCINATE ER PO        Dose:  50 mg   Take 50 mg by mouth 2 times daily   Refills:  0       PANTOPRAZOLE SODIUM PO        Dose:  40 mg   Take 40 mg by mouth every morning (before breakfast)   Refills:  0       ZANTAC PO        Dose:  300 mg   Take 300 mg by mouth At Bedtime   Refills:  0            Where to get your medicines      These medications were sent to [facility] [place], [place] - [address] [address]     Phone:  [phone]     ciprofloxacin 500 MG tablet                Protect others around you: Learn how to safely use, store and throw away your medicines at www.disposemymeds.org.        ANTIBIOTIC INSTRUCTION     You've Been Prescribed an Antibiotic - Now What?  Your healthcare team thinks that you  or your loved one might have an infection. Some infections can be treated with antibiotics, which are powerful, life-saving drugs. Like all medications, antibiotics have side effects and should only be used when necessary. There are some important things you should know about your antibiotic treatment.      Your healthcare team may run tests before you start taking an antibiotic.    Your team may take samples (e.g., from your blood, urine or other areas) to run tests to look for bacteria. These test can be important to determine if you need an antibiotic at all and, if you do, which antibiotic will work best.      Within a few days, your healthcare team might change or even stop your antibiotic.    Your team may start you on an antibiotic while they are working to find out what is making you sick.    Your team might change your antibiotic because test results show that a different antibiotic would be better to treat your infection.    In some cases, once your team has more information, they learn that you do not need an antibiotic at all. They may find out that you don't have an infection, or that the antibiotic you're taking won't work against your infection. For example, an infection caused by a virus can't be treated with antibiotics. Staying on an antibiotic when you don't need it is more likely to be harmful than helpful.      You may experience side effects from your antibiotic.    Like all medications, antibiotics have side effects. Some of these can be serious.    Let you healthcare team know if you have any known allergies when you are admitted to the hospital.    One significant side effect of nearly all antibiotics is the risk of severe and sometimes deadly diarrhea caused by Clostridium difficile (C. Difficile). This occurs when a person takes antibiotics because some good germs are destroyed. Antibiotic use allows C. diificile to take over, putting patients at high risk for this serious infection.    As  a patient or caregiver, it is important to understand your or your loved one's antibiotic treatment. It is especially important for caregivers to speak up when patients can't speak for themselves. Here are some important questions to ask your healthcare team.    What infection is this antibiotic treating and how do you know I have that infection?    What side effects might occur from this antibiotic?    How long will I need to take this antibiotic?    Is it safe to take this antibiotic with other medications or supplements (e.g., vitamins) that I am taking?     Are there any special directions I need to know about taking this antibiotic? For example, should I take it with food?    How will I be monitored to know whether my infection is responding to the antibiotic?    What tests may help to make sure the right antibiotic is prescribed for me?      Information provided by:  www.cdc.gov/getsmart  U.S. Department of Health and Human Services  Centers for disease Control and Prevention  National Center for Emerging and Zoonotic Infectious Diseases  Division of Healthcare Quality Promotion             Medication List: This is a list of all your medications and when to take them. Check marks below indicate your daily home schedule. Keep this list as a reference.      Medications           Morning Afternoon Evening Bedtime As Needed    AMLODIPINE BESYLATE PO   Take 5 mg by mouth daily   Last time this was given:  5 mg on 10/10/2018  7:25 AM                                ciprofloxacin 500 MG tablet   Commonly known as:  CIPRO   Take 1 tablet (500 mg) by mouth every 12 hours   Last time this was given:  500 mg on 10/10/2018 10:33 AM                                FUROSEMIDE PO   Take 20 mg by mouth M - W - friday                                LOSARTAN POTASSIUM PO   Take 100 mg by mouth daily   Last time this was given:  100 mg on 10/10/2018  7:26 AM                                METOPROLOL SUCCINATE ER PO   Take 50 mg by  mouth 2 times daily   Last time this was given:  50 mg on 10/10/2018  7:25 AM                                PANTOPRAZOLE SODIUM PO   Take 40 mg by mouth every morning (before breakfast)   Last time this was given:  40 mg on 10/10/2018  7:26 AM                                ZANTAC PO   Take 300 mg by mouth At Bedtime

## 2018-10-09 NOTE — IP AVS SNAPSHOT
Unit 6D Observation [address] [address]    Phone:  [phone]    Fax:  [phone]                                       After Visit Summary   10/9/2018    Tim Cazares    MRN: [id number]           After Visit Summary Signature Page     I have received my discharge instructions, and my questions have been answered. I have discussed any challenges I see with this plan with the nurse or doctor.    ..........................................................................................................................................  Patient/Patient Representative Signature      ..........................................................................................................................................  Patient Representative Print Name and Relationship to Patient    ..................................................               ................................................  Date                                   Time    ..........................................................................................................................................  Reviewed by Signature/Title    ...................................................              ..............................................  Date                                               Time          22EPIC Rev 08/18

## 2018-10-09 NOTE — ANESTHESIA CARE TRANSFER NOTE
Patient: Tim Cazares    Procedure(s):   Endoscopic Retrograde Cholangiopancreatogram, pancreatic stent placement, biliary sphincterotomy, cholangioscopy, brushings and biopsies, biliary stent placement - Wound Class: II-Clean Contaminated   - Wound Class: II-Clean Contaminated    Diagnosis: Pancreatic Mass   Diagnosis Additional Information: No value filed.    Anesthesia Type:   General, RSI     Note:  Airway :Face Mask  Patient transferred to:PACU  Comments: VSS. Breathing spont. Report to RN at bedside.Handoff Report: Identifed the Patient, Identified the Reponsible Provider, Reviewed the pertinent medical history, Discussed the surgical course, Reviewed Intra-OP anesthesia mangement and issues during anesthesia, Set expectations for post-procedure period and Allowed opportunity for questions and acknowledgement of understanding      Vitals: (Last set prior to Anesthesia Care Transfer)    CRNA VITALS  10/9/2018 1607 - 10/9/2018 1637      10/9/2018             Resp Rate (observed): (!)  2                Electronically Signed By: ARMAAN Gonzalez CRNA  October 9, 2018  4:37 PM

## 2018-10-10 VITALS
DIASTOLIC BLOOD PRESSURE: 71 MMHG | HEART RATE: 68 BPM | OXYGEN SATURATION: 96 % | BODY MASS INDEX: 23.56 KG/M2 | SYSTOLIC BLOOD PRESSURE: 146 MMHG | RESPIRATION RATE: 16 BRPM | TEMPERATURE: 98.3 F | HEIGHT: 64 IN | WEIGHT: 138.01 LBS

## 2018-10-10 LAB
ALBUMIN SERPL-MCNC: 2.8 G/DL (ref 3.4–5)
ALP SERPL-CCNC: 234 U/L (ref 40–150)
ALT SERPL W P-5'-P-CCNC: 40 U/L (ref 0–70)
ANION GAP SERPL CALCULATED.3IONS-SCNC: 11 MMOL/L (ref 3–14)
AST SERPL W P-5'-P-CCNC: 26 U/L (ref 0–45)
BILIRUB SERPL-MCNC: 0.8 MG/DL (ref 0.2–1.3)
BUN SERPL-MCNC: 16 MG/DL (ref 7–30)
CALCIUM SERPL-MCNC: 9 MG/DL (ref 8.5–10.1)
CHLORIDE SERPL-SCNC: 101 MMOL/L (ref 94–109)
CO2 SERPL-SCNC: 24 MMOL/L (ref 20–32)
COPATH REPORT: NORMAL
COPATH REPORT: NORMAL
CREAT SERPL-MCNC: 1.05 MG/DL (ref 0.66–1.25)
ERYTHROCYTE [DISTWIDTH] IN BLOOD BY AUTOMATED COUNT: 13.5 % (ref 10–15)
GFR SERPL CREATININE-BSD FRML MDRD: 68 ML/MIN/1.7M2
GLUCOSE SERPL-MCNC: 101 MG/DL (ref 70–99)
HCT VFR BLD AUTO: 37.6 % (ref 40–53)
HGB BLD-MCNC: 12.3 G/DL (ref 13.3–17.7)
MCH RBC QN AUTO: 31.7 PG (ref 26.5–33)
MCHC RBC AUTO-ENTMCNC: 32.7 G/DL (ref 31.5–36.5)
MCV RBC AUTO: 97 FL (ref 78–100)
PLATELET # BLD AUTO: 254 10E9/L (ref 150–450)
POTASSIUM SERPL-SCNC: 4 MMOL/L (ref 3.4–5.3)
PROT SERPL-MCNC: 6.9 G/DL (ref 6.8–8.8)
RBC # BLD AUTO: 3.88 10E12/L (ref 4.4–5.9)
SODIUM SERPL-SCNC: 136 MMOL/L (ref 133–144)
WBC # BLD AUTO: 9.6 10E9/L (ref 4–11)

## 2018-10-10 PROCEDURE — A9270 NON-COVERED ITEM OR SERVICE: HCPCS | Mod: GY | Performed by: PHYSICIAN ASSISTANT

## 2018-10-10 PROCEDURE — 25000132 ZZH RX MED GY IP 250 OP 250 PS 637: Mod: GY | Performed by: PHYSICIAN ASSISTANT

## 2018-10-10 PROCEDURE — 80053 COMPREHEN METABOLIC PANEL: CPT | Performed by: PHYSICIAN ASSISTANT

## 2018-10-10 PROCEDURE — 36415 COLL VENOUS BLD VENIPUNCTURE: CPT | Performed by: PHYSICIAN ASSISTANT

## 2018-10-10 PROCEDURE — 99207 ZZC APP CREDIT; MD BILLING SHARED VISIT: CPT | Performed by: PHYSICIAN ASSISTANT

## 2018-10-10 PROCEDURE — G0378 HOSPITAL OBSERVATION PER HR: HCPCS

## 2018-10-10 PROCEDURE — 85027 COMPLETE CBC AUTOMATED: CPT | Performed by: PHYSICIAN ASSISTANT

## 2018-10-10 PROCEDURE — 99217 ZZC OBSERVATION CARE DISCHARGE: CPT | Performed by: INTERNAL MEDICINE

## 2018-10-10 RX ORDER — CIPROFLOXACIN 500 MG/1
500 TABLET, FILM COATED ORAL EVERY 12 HOURS
Qty: 9 TABLET | Refills: 0 | Status: SHIPPED | OUTPATIENT
Start: 2018-10-10 | End: 2019-01-18

## 2018-10-10 RX ORDER — CIPROFLOXACIN 500 MG/1
500 TABLET, FILM COATED ORAL EVERY 12 HOURS SCHEDULED
Status: DISCONTINUED | OUTPATIENT
Start: 2018-10-10 | End: 2018-10-10 | Stop reason: HOSPADM

## 2018-10-10 RX ADMIN — METOPROLOL SUCCINATE 50 MG: 50 TABLET, EXTENDED RELEASE ORAL at 07:25

## 2018-10-10 RX ADMIN — LOSARTAN POTASSIUM 100 MG: 100 TABLET ORAL at 07:26

## 2018-10-10 RX ADMIN — AMLODIPINE BESYLATE 5 MG: 2.5 TABLET ORAL at 07:25

## 2018-10-10 RX ADMIN — RANITIDINE 300 MG: 75 TABLET ORAL at 00:35

## 2018-10-10 RX ADMIN — PANTOPRAZOLE SODIUM 40 MG: 40 TABLET, DELAYED RELEASE ORAL at 07:26

## 2018-10-10 RX ADMIN — CIPROFLOXACIN HYDROCHLORIDE 500 MG: 500 TABLET, FILM COATED ORAL at 10:33

## 2018-10-10 NOTE — DISCHARGE SUMMARY
[facility], [place]    Internal Medicine Discharge Summary  Gold Service    Date of Admission:  10/9/2018  Date of Discharge:  10/10/2018  Discharging Provider: Dominik Ricci PA-C   Discharge Team: Gold 3    Discharge Diagnoses   1. Biliary obstruction s/p ERCP with biliary stent placement.  2. Hypertension.   3. CKD stage III.  4. GERD.    Hospital Course   Tim Cazares was admitted on 10/9/2018 for observation following ERCP.  The following problems were addressed during his hospitalization:      Biliary Obstruction s/p ERCP with biliary stenting:  Hx failure to thrive and elevated LFT's. MRCP showed obstruction at bifurcation of biliary tree concerning for malignancy. Stenosis noted on ERCP and samples obtained to further evaluate for malignant source. Patient did well post-procedure. No pain. Diet advanced without difficulty. Dr. Barraza will follow up with patient regarding biopsy results. Patient will discharge home in stable condition.      HTN:  Continued on PTA regimen of amlodipine, losartan, metoprolol, and lasix. BP stable post-op. Follow up with PCP in 1 week.         CKD:  Noted in chart but no historical data available in chart to confirm baseline renal function. Cr 1.29 on admission. Repeat Cr 1.05. Recommend ongoing OP follow up with PCP to monitor.       Consultations This Hospital Stay   ADVANCE DIRECTIVE IP CONSULT  ADVANCE DIRECTIVE IP CONSULT    Code Status   Full Code    Time Spent on this Encounter   I, Dominik Ricci, personally saw the patient today and spent greater than 30 minutes discharging this patient.       Dominik Ricci PA-C  Internal Medicine Staff Hospitalist Service  [facility]  Pager: [phone]  ______________________________________________________________________    Physical Exam   Vital Signs: Temp: 98.3  F (36.8  C) Temp src: Oral BP: 146/71 Pulse: 68 Heart Rate: 54 Resp: 16 SpO2: 96 % O2 Device: None (Room air)  Oxygen Delivery: 2 LPM  Weight: 138 lbs .13 oz    General Appearance: Awake. Alert. NAD.   Respiratory:  Lungs CTAB.   Cardiovascular: RRR. S1 and S2. No murmur.   GI: Abd soft, ND, NT, active BS.   Skin: No rash.   Other: No focal neurologic deficits.     Significant Results and Procedures   ROUTINE IP LABS (Last four results)    Recent Labs  Lab 10/10/18  0648 10/09/18  1228    137   POTASSIUM 4.0 4.3   CHLORIDE 101 101   CO2 24 28   ANIONGAP 11 8   * 105*   BUN 16 14   CR 1.05 1.29*   LUZMARIA 9.0 9.3   PROTTOTAL 6.9 7.9   ALBUMIN 2.8* 3.3*   BILITOTAL 0.8 0.6   ALKPHOS 234* 275*   AST 26 24   ALT 40 34       Recent Labs  Lab 10/10/18  0648 10/09/18  1228   WBC 9.6 9.0   RBC 3.88* 4.07*   HGB 12.3* 13.2*   HCT 37.6* 39.8*   MCV 97 98   MCH 31.7 32.4   MCHC 32.7 33.2   RDW 13.5 13.6    285     No lab results found in last 7 days.     Glucose Values Latest Ref Rng & Units 10/9/2018 10/9/2018 10/10/2018   Bedside Glucose (mg/dl )  - -- -- --   GLUCOSE 70 - 99 mg/dL 89 105(H) 101(H)   Some recent data might be hidden            Pending Results   These results will be followed up by Dr. Barraza.   Unresulted Labs Ordered in the Past 30 Days of this Admission     Date and Time Order Name Status Description    10/9/2018 1552 Cytology non gyn In process     10/9/2018 1541 Surgical pathology exam In process              Primary Care Physician   Dmitriy Wei    Discharge Disposition   Discharged to home  Condition at discharge: Stable    Discharge Orders     Reason for your hospital stay   Admitted for observation following ERCP. Hospital stay was uncomplicated.     Adult [facility] Follow-up and recommended labs and tests   Follow up with Gastroenterology in 2 weeks for repeat EUS and ERCP, and again in 8 weeks for exchange of pancreatic stent.    Appointments on [place] and/or [facility] (with [facility] or [facility] provider or service). Call [phone] if you haven't heard regarding these  appointments within 7 days of discharge.     Follow Up and recommended labs and tests   Follow up with primary care provider, Dmitriy Wei, within 7 days for hospital follow- up.  No follow up labs or test are needed.     Activity   Your activity upon discharge: activity as tolerated     Full Code     Diet   Follow this diet upon discharge: Regular Diet Adult       Discharge Medications   Current Discharge Medication List      START taking these medications    Details   ciprofloxacin (CIPRO) 500 MG tablet Take 1 tablet (500 mg) by mouth every 12 hours  Qty: 9 tablet, Refills: 0    Associated Diagnoses: Bile duct stricture         CONTINUE these medications which have NOT CHANGED    Details   AMLODIPINE BESYLATE PO Take 5 mg by mouth daily      FUROSEMIDE PO Take 20 mg by mouth M - W - friday      LOSARTAN POTASSIUM PO Take 100 mg by mouth daily      METOPROLOL SUCCINATE ER PO Take 50 mg by mouth 2 times daily      PANTOPRAZOLE SODIUM PO Take 40 mg by mouth every morning (before breakfast)      RaNITidine HCl (ZANTAC PO) Take 300 mg by mouth At Bedtime           Allergies   Allergies   Allergen Reactions     Penicillin G      Strawberries [Strawberry]      Sulfa Drugs

## 2018-10-10 NOTE — PLAN OF CARE
Problem: Patient Care Overview  Goal: Discharge Needs Assessment  -diagnostic tests and consults completed and resulted. No. Labs pending.  -vital signs normal or at patient baseline. Yes  -tolerating oral intake to maintain hydration. Yes  -adequate pain control on oral analgesics. Yes  -tolerating oral antibiotics or has plans for home infusion setup. N/a

## 2018-10-10 NOTE — PLAN OF CARE
"Problem: Patient Care Overview  Goal: Discharge Needs Assessment  Outpatient/Observation goals to be met before discharge home:    -diagnostic tests and consults completed and resulted   -vital signs normal or at patient baseline- Yes  -tolerating oral intake to maintain hydration -Yes  -adequate pain control on oral analgesics -Denies pain at this time.  -tolerating oral antibiotics or has plans for home infusion setup - In progress  Nurse to notify provider when observation goals have been met and patient is ready for discharge.  Pt ate, voided and walked. Tylenol for generalized pain. AVSS /63  Pulse 68  Temp 98.6  F (37  C)  Resp 16  Ht 1.626 m (5' 4\")  Wt 62.6 kg (138 lb 0.1 oz)  SpO2 98%  BMI 23.69 kg/m2        "

## 2018-10-10 NOTE — PLAN OF CARE
Problem: Patient Care Overview  Goal: Plan of Care/Patient Progress Review  Outpatient/Observation goals to be met before discharge home:    -diagnostic tests and consults completed and resulted - No  -vital signs normal or at patient baseline - Yes  -tolerating oral intake to maintain hydration - Yes  -adequate pain control on oral analgesics - Yes  -tolerating oral antibiotics or has plans for home infusion setup - Yes

## 2018-10-10 NOTE — PROGRESS NOTES
IV removed.  Discharge instructions given and questions answered.  Pt. Able to verbalize understanding.  Pt. Has a ride home.  Pt. Has all belongings.  Pt. Is walking to discharge pharmacy and then to front lobby.

## 2018-10-12 ENCOUNTER — CARE COORDINATION (OUTPATIENT)
Dept: GASTROENTEROLOGY | Facility: CLINIC | Age: 83
End: 2018-10-12

## 2018-10-12 NOTE — PROGRESS NOTES
Post ERCP (10/9/2018) with Dr. Barraza: Follow-up    Post procedure recommendations: Dr Barraza to communicate results of sampling when available; if negative we should consider repeat imaging (pending renal function) and return in 2w for repeat sampling by EUS and ERCP, though transgastric direct sampling of the lesion would not be preferred due to theory of seeding; if sampling positive, return for   exchange to metal stents in 8 weeks assuming plain film demonstrates spontaneous ejection of the pancreatic stent   - Avoid antithrombotics for at least three days     Orders placed: None at this time.     Spoke to wife, he is just tender. Denies nausea, vomiting, fever and pain. Taking in PO with no issues.   They went to an oncology MD today referred by their primary: his number is [phone] in case we needed to be in contact with him- she wanted us to have the number.     Clinic contact and scheduling numbers verified for future questions/concerns.     Sharon ROSENBERG RN Coordinator  Dr. Bella, Dr. Barraza & Dr. Agosto  Advanced Endoscopy  [phone]

## 2018-10-19 ENCOUNTER — TELEPHONE (OUTPATIENT)
Dept: GASTROENTEROLOGY | Facility: CLINIC | Age: 83
End: 2018-10-19

## 2018-10-19 NOTE — TELEPHONE ENCOUNTER
[facility] Call Center    Phone Message    May a detailed message be left on voicemail: yes    Reason for Call: Other: Patient's wife Brea called to speak to Dr. Barraza's team regarding a follow up procedure for patient. Brea stated that patient was discharged from [facility] on 10/10/18 and is supposed to follow up in 2 weeks for an EUS and ERCP. Brea stated they have not heard back on a date or time yet. Please call Brea back.     Action Taken: Message routed to:  [facility] ([facility]): Jaycee

## 2018-10-22 DIAGNOSIS — Z53.9 ERRONEOUS ENCOUNTER--DISREGARD: Primary | ICD-10-CM

## 2018-11-04 ENCOUNTER — ANESTHESIA EVENT (OUTPATIENT)
Dept: SURGERY | Facility: CLINIC | Age: 83
End: 2018-11-04
Payer: MEDICARE

## 2018-11-05 ENCOUNTER — SURGERY (OUTPATIENT)
Age: 83
End: 2018-11-05

## 2018-11-05 ENCOUNTER — HOSPITAL ENCOUNTER (OUTPATIENT)
Facility: CLINIC | Age: 83
Discharge: HOME | End: 2018-11-05
Attending: INTERNAL MEDICINE | Admitting: INTERNAL MEDICINE
Payer: MEDICARE

## 2018-11-05 ENCOUNTER — ANESTHESIA (OUTPATIENT)
Dept: SURGERY | Facility: CLINIC | Age: 83
End: 2018-11-05
Payer: MEDICARE

## 2018-11-05 ENCOUNTER — APPOINTMENT (OUTPATIENT)
Dept: GENERAL RADIOLOGY | Facility: CLINIC | Age: 83
End: 2018-11-05
Attending: INTERNAL MEDICINE
Payer: MEDICARE

## 2018-11-05 VITALS
BODY MASS INDEX: 23.32 KG/M2 | DIASTOLIC BLOOD PRESSURE: 68 MMHG | RESPIRATION RATE: 16 BRPM | SYSTOLIC BLOOD PRESSURE: 129 MMHG | HEART RATE: 55 BPM | HEIGHT: 65 IN | TEMPERATURE: 97.8 F | OXYGEN SATURATION: 98 % | WEIGHT: 139.99 LBS

## 2018-11-05 DIAGNOSIS — K83.1 BILE DUCT STRICTURE (H): Primary | ICD-10-CM

## 2018-11-05 LAB
ABO + RH BLD: NORMAL
ABO + RH BLD: NORMAL
ALBUMIN SERPL-MCNC: 3.3 G/DL (ref 3.4–5)
ALP SERPL-CCNC: 76 U/L (ref 40–150)
ALT SERPL W P-5'-P-CCNC: 26 U/L (ref 0–70)
AMYLASE SERPL-CCNC: 85 U/L (ref 30–110)
ANION GAP SERPL CALCULATED.3IONS-SCNC: 6 MMOL/L (ref 3–14)
AST SERPL W P-5'-P-CCNC: 20 U/L (ref 0–45)
BILIRUB SERPL-MCNC: 0.3 MG/DL (ref 0.2–1.3)
BLD GP AB SCN SERPL QL: NORMAL
BLOOD BANK CMNT PATIENT-IMP: NORMAL
BUN SERPL-MCNC: 19 MG/DL (ref 7–30)
CALCIUM SERPL-MCNC: 8.8 MG/DL (ref 8.5–10.1)
CHLORIDE SERPL-SCNC: 105 MMOL/L (ref 94–109)
CO2 SERPL-SCNC: 28 MMOL/L (ref 20–32)
CREAT SERPL-MCNC: 1.23 MG/DL (ref 0.66–1.25)
ERCP: NORMAL
ERYTHROCYTE [DISTWIDTH] IN BLOOD BY AUTOMATED COUNT: 14 % (ref 10–15)
GFR SERPL CREATININE-BSD FRML MDRD: 56 ML/MIN/1.7M2
GLUCOSE BLDC GLUCOMTR-MCNC: 81 MG/DL (ref 70–99)
GLUCOSE SERPL-MCNC: 91 MG/DL (ref 70–99)
HCT VFR BLD AUTO: 38 % (ref 40–53)
HGB BLD-MCNC: 12.4 G/DL (ref 13.3–17.7)
LIPASE SERPL-CCNC: 134 U/L (ref 73–393)
MCH RBC QN AUTO: 32.3 PG (ref 26.5–33)
MCHC RBC AUTO-ENTMCNC: 32.6 G/DL (ref 31.5–36.5)
MCV RBC AUTO: 99 FL (ref 78–100)
PLATELET # BLD AUTO: 190 10E9/L (ref 150–450)
POTASSIUM SERPL-SCNC: 4.4 MMOL/L (ref 3.4–5.3)
PROT SERPL-MCNC: 6.7 G/DL (ref 6.8–8.8)
RBC # BLD AUTO: 3.84 10E12/L (ref 4.4–5.9)
SODIUM SERPL-SCNC: 139 MMOL/L (ref 133–144)
SPECIMEN EXP DATE BLD: NORMAL
UPPER EUS: NORMAL
WBC # BLD AUTO: 6.2 10E9/L (ref 4–11)

## 2018-11-05 PROCEDURE — 37000009 ZZH ANESTHESIA TECHNICAL FEE, EACH ADDTL 15 MIN: Performed by: INTERNAL MEDICINE

## 2018-11-05 PROCEDURE — 88305 TISSUE EXAM BY PATHOLOGIST: CPT | Performed by: INTERNAL MEDICINE

## 2018-11-05 PROCEDURE — 71000014 ZZH RECOVERY PHASE 1 LEVEL 2 FIRST HR: Performed by: INTERNAL MEDICINE

## 2018-11-05 PROCEDURE — 37000008 ZZH ANESTHESIA TECHNICAL FEE, 1ST 30 MIN: Performed by: INTERNAL MEDICINE

## 2018-11-05 PROCEDURE — 25000566 ZZH SEVOFLURANE, EA 15 MIN: Performed by: INTERNAL MEDICINE

## 2018-11-05 PROCEDURE — 82962 GLUCOSE BLOOD TEST: CPT

## 2018-11-05 PROCEDURE — 85027 COMPLETE CBC AUTOMATED: CPT | Performed by: INTERNAL MEDICINE

## 2018-11-05 PROCEDURE — 36415 COLL VENOUS BLD VENIPUNCTURE: CPT | Performed by: INTERNAL MEDICINE

## 2018-11-05 PROCEDURE — 71000027 ZZH RECOVERY PHASE 2 EACH 15 MINS: Performed by: INTERNAL MEDICINE

## 2018-11-05 PROCEDURE — 27210794 ZZH OR GENERAL SUPPLY STERILE: Performed by: INTERNAL MEDICINE

## 2018-11-05 PROCEDURE — 25000128 H RX IP 250 OP 636: Performed by: NURSE ANESTHETIST, CERTIFIED REGISTERED

## 2018-11-05 PROCEDURE — 86850 RBC ANTIBODY SCREEN: CPT | Performed by: INTERNAL MEDICINE

## 2018-11-05 PROCEDURE — 25000125 ZZHC RX 250: Performed by: INTERNAL MEDICINE

## 2018-11-05 PROCEDURE — 88173 CYTOPATH EVAL FNA REPORT: CPT | Performed by: INTERNAL MEDICINE

## 2018-11-05 PROCEDURE — 25000125 ZZHC RX 250: Performed by: NURSE ANESTHETIST, CERTIFIED REGISTERED

## 2018-11-05 PROCEDURE — 00000155 ZZHCL STATISTIC H-CELL BLOCK W/STAIN: Performed by: INTERNAL MEDICINE

## 2018-11-05 PROCEDURE — A9270 NON-COVERED ITEM OR SERVICE: HCPCS | Performed by: INTERNAL MEDICINE

## 2018-11-05 PROCEDURE — 40000065 ZZH STATISTIC EKG NON-CHARGEABLE

## 2018-11-05 PROCEDURE — C1769 GUIDE WIRE: HCPCS | Performed by: INTERNAL MEDICINE

## 2018-11-05 PROCEDURE — 25000128 H RX IP 250 OP 636: Performed by: ANESTHESIOLOGY

## 2018-11-05 PROCEDURE — 88112 CYTOPATH CELL ENHANCE TECH: CPT | Mod: XU | Performed by: INTERNAL MEDICINE

## 2018-11-05 PROCEDURE — 86901 BLOOD TYPING SEROLOGIC RH(D): CPT | Performed by: INTERNAL MEDICINE

## 2018-11-05 PROCEDURE — 40000170 ZZH STATISTIC PRE-PROCEDURE ASSESSMENT II: Performed by: INTERNAL MEDICINE

## 2018-11-05 PROCEDURE — 86900 BLOOD TYPING SEROLOGIC ABO: CPT | Performed by: INTERNAL MEDICINE

## 2018-11-05 PROCEDURE — 25500064 ZZH RX 255 OP 636: Performed by: INTERNAL MEDICINE

## 2018-11-05 PROCEDURE — 83690 ASSAY OF LIPASE: CPT | Performed by: INTERNAL MEDICINE

## 2018-11-05 PROCEDURE — 36000064 ZZH SURGERY LEVEL 4 EA 15 ADDTL MIN - UMMC: Performed by: INTERNAL MEDICINE

## 2018-11-05 PROCEDURE — 40000279 XR SURGERY CARM FLUORO GREATER THAN 5 MIN W STILLS: Mod: TC

## 2018-11-05 PROCEDURE — 93005 ELECTROCARDIOGRAM TRACING: CPT

## 2018-11-05 PROCEDURE — 82150 ASSAY OF AMYLASE: CPT | Performed by: INTERNAL MEDICINE

## 2018-11-05 PROCEDURE — 36000066 ZZH SURGERY LEVEL 4 W FLUORO 1ST 30 MIN - UMMC: Performed by: INTERNAL MEDICINE

## 2018-11-05 PROCEDURE — 27211024 ZZHC OR SUPPLY OTHER OPNP: Performed by: INTERNAL MEDICINE

## 2018-11-05 PROCEDURE — 80053 COMPREHEN METABOLIC PANEL: CPT | Performed by: INTERNAL MEDICINE

## 2018-11-05 PROCEDURE — C1876 STENT, NON-COA/NON-COV W/DEL: HCPCS | Performed by: INTERNAL MEDICINE

## 2018-11-05 PROCEDURE — 00000467 ZZHCL STATISTIC CYTO FNA IM TC STAT 88172: Performed by: INTERNAL MEDICINE

## 2018-11-05 DEVICE — STENT JOHLIN PANCREA WEDGE 10FRX22CM W/INTRO G26828
Type: IMPLANTABLE DEVICE | Site: BILE DUCT | Status: NON-FUNCTIONAL
Removed: 2019-01-22

## 2018-11-05 DEVICE — STENT JOHLIN PANCREA WEDGE 10FRX20CM W/INTRO G26827
Type: IMPLANTABLE DEVICE | Site: BILE DUCT | Status: NON-FUNCTIONAL
Removed: 2019-01-22

## 2018-11-05 RX ORDER — LIDOCAINE HYDROCHLORIDE 20 MG/ML
INJECTION, SOLUTION INFILTRATION; PERINEURAL PRN
Status: DISCONTINUED | OUTPATIENT
Start: 2018-11-05 | End: 2018-11-05

## 2018-11-05 RX ORDER — HYDROMORPHONE HYDROCHLORIDE 1 MG/ML
.3-.5 INJECTION, SOLUTION INTRAMUSCULAR; INTRAVENOUS; SUBCUTANEOUS EVERY 5 MIN PRN
Status: DISCONTINUED | OUTPATIENT
Start: 2018-11-05 | End: 2018-11-05 | Stop reason: HOSPADM

## 2018-11-05 RX ORDER — CIPROFLOXACIN 2 MG/ML
INJECTION, SOLUTION INTRAVENOUS PRN
Status: DISCONTINUED | OUTPATIENT
Start: 2018-11-05 | End: 2018-11-05

## 2018-11-05 RX ORDER — GLYCOPYRROLATE 0.2 MG/ML
INJECTION, SOLUTION INTRAMUSCULAR; INTRAVENOUS PRN
Status: DISCONTINUED | OUTPATIENT
Start: 2018-11-05 | End: 2018-11-05

## 2018-11-05 RX ORDER — SODIUM CHLORIDE, SODIUM LACTATE, POTASSIUM CHLORIDE, CALCIUM CHLORIDE 600; 310; 30; 20 MG/100ML; MG/100ML; MG/100ML; MG/100ML
INJECTION, SOLUTION INTRAVENOUS CONTINUOUS PRN
Status: DISCONTINUED | OUTPATIENT
Start: 2018-11-05 | End: 2018-11-05

## 2018-11-05 RX ORDER — SODIUM CHLORIDE, SODIUM LACTATE, POTASSIUM CHLORIDE, CALCIUM CHLORIDE 600; 310; 30; 20 MG/100ML; MG/100ML; MG/100ML; MG/100ML
INJECTION, SOLUTION INTRAVENOUS CONTINUOUS
Status: DISCONTINUED | OUTPATIENT
Start: 2018-11-05 | End: 2018-11-05 | Stop reason: HOSPADM

## 2018-11-05 RX ORDER — EPHEDRINE SULFATE 50 MG/ML
INJECTION, SOLUTION INTRAMUSCULAR; INTRAVENOUS; SUBCUTANEOUS PRN
Status: DISCONTINUED | OUTPATIENT
Start: 2018-11-05 | End: 2018-11-05

## 2018-11-05 RX ORDER — HYDRALAZINE HYDROCHLORIDE 20 MG/ML
2.5-5 INJECTION INTRAMUSCULAR; INTRAVENOUS EVERY 10 MIN PRN
Status: DISCONTINUED | OUTPATIENT
Start: 2018-11-05 | End: 2018-11-05 | Stop reason: HOSPADM

## 2018-11-05 RX ORDER — INDOMETHACIN 50 MG/1
100 SUPPOSITORY RECTAL
Status: DISCONTINUED | OUTPATIENT
Start: 2018-11-05 | End: 2018-11-05 | Stop reason: HOSPADM

## 2018-11-05 RX ORDER — IOPAMIDOL 510 MG/ML
INJECTION, SOLUTION INTRAVASCULAR PRN
Status: DISCONTINUED | OUTPATIENT
Start: 2018-11-05 | End: 2018-11-05 | Stop reason: HOSPADM

## 2018-11-05 RX ORDER — ONDANSETRON 2 MG/ML
4 INJECTION INTRAMUSCULAR; INTRAVENOUS EVERY 30 MIN PRN
Status: DISCONTINUED | OUTPATIENT
Start: 2018-11-05 | End: 2018-11-05 | Stop reason: HOSPADM

## 2018-11-05 RX ORDER — LIDOCAINE 40 MG/G
CREAM TOPICAL
Status: DISCONTINUED | OUTPATIENT
Start: 2018-11-05 | End: 2018-11-05 | Stop reason: HOSPADM

## 2018-11-05 RX ORDER — FENTANYL CITRATE 50 UG/ML
INJECTION, SOLUTION INTRAMUSCULAR; INTRAVENOUS PRN
Status: DISCONTINUED | OUTPATIENT
Start: 2018-11-05 | End: 2018-11-05

## 2018-11-05 RX ORDER — CIPROFLOXACIN 500 MG/1
500 TABLET, FILM COATED ORAL 2 TIMES DAILY
Qty: 10 TABLET | Refills: 0 | Status: SHIPPED | OUTPATIENT
Start: 2018-11-05 | End: 2018-11-10

## 2018-11-05 RX ORDER — NALOXONE HYDROCHLORIDE 0.4 MG/ML
.1-.4 INJECTION, SOLUTION INTRAMUSCULAR; INTRAVENOUS; SUBCUTANEOUS
Status: DISCONTINUED | OUTPATIENT
Start: 2018-11-05 | End: 2018-11-05

## 2018-11-05 RX ORDER — NALOXONE HYDROCHLORIDE 0.4 MG/ML
.1-.4 INJECTION, SOLUTION INTRAMUSCULAR; INTRAVENOUS; SUBCUTANEOUS
Status: DISCONTINUED | OUTPATIENT
Start: 2018-11-05 | End: 2018-11-05 | Stop reason: HOSPADM

## 2018-11-05 RX ORDER — DEXAMETHASONE SODIUM PHOSPHATE 4 MG/ML
INJECTION, SOLUTION INTRA-ARTICULAR; INTRALESIONAL; INTRAMUSCULAR; INTRAVENOUS; SOFT TISSUE PRN
Status: DISCONTINUED | OUTPATIENT
Start: 2018-11-05 | End: 2018-11-05

## 2018-11-05 RX ORDER — PROPOFOL 10 MG/ML
INJECTION, EMULSION INTRAVENOUS PRN
Status: DISCONTINUED | OUTPATIENT
Start: 2018-11-05 | End: 2018-11-05

## 2018-11-05 RX ORDER — FLUMAZENIL 0.1 MG/ML
0.2 INJECTION, SOLUTION INTRAVENOUS
Status: DISCONTINUED | OUTPATIENT
Start: 2018-11-05 | End: 2018-11-05 | Stop reason: HOSPADM

## 2018-11-05 RX ORDER — FENTANYL CITRATE 50 UG/ML
25-50 INJECTION, SOLUTION INTRAMUSCULAR; INTRAVENOUS
Status: DISCONTINUED | OUTPATIENT
Start: 2018-11-05 | End: 2018-11-05 | Stop reason: HOSPADM

## 2018-11-05 RX ORDER — ONDANSETRON 4 MG/1
4 TABLET, ORALLY DISINTEGRATING ORAL EVERY 30 MIN PRN
Status: DISCONTINUED | OUTPATIENT
Start: 2018-11-05 | End: 2018-11-05 | Stop reason: HOSPADM

## 2018-11-05 RX ORDER — ONDANSETRON 2 MG/ML
INJECTION INTRAMUSCULAR; INTRAVENOUS PRN
Status: DISCONTINUED | OUTPATIENT
Start: 2018-11-05 | End: 2018-11-05

## 2018-11-05 RX ADMIN — ROCURONIUM BROMIDE 10 MG: 10 INJECTION INTRAVENOUS at 08:33

## 2018-11-05 RX ADMIN — ONDANSETRON 4 MG: 2 INJECTION INTRAMUSCULAR; INTRAVENOUS at 09:26

## 2018-11-05 RX ADMIN — Medication 5 MG: at 09:24

## 2018-11-05 RX ADMIN — PROPOFOL 120 MG: 10 INJECTION, EMULSION INTRAVENOUS at 07:25

## 2018-11-05 RX ADMIN — SIMETHICONE 2 ML: 63.3; 3.7 SOLUTION/ DROPS ORAL at 08:24

## 2018-11-05 RX ADMIN — SODIUM CHLORIDE, POTASSIUM CHLORIDE, SODIUM LACTATE AND CALCIUM CHLORIDE: 600; 310; 30; 20 INJECTION, SOLUTION INTRAVENOUS at 07:09

## 2018-11-05 RX ADMIN — Medication 5 MG: at 07:54

## 2018-11-05 RX ADMIN — GLYCOPYRROLATE 0.1 MG: 0.2 INJECTION, SOLUTION INTRAMUSCULAR; INTRAVENOUS at 07:37

## 2018-11-05 RX ADMIN — ROCURONIUM BROMIDE 10 MG: 10 INJECTION INTRAVENOUS at 07:58

## 2018-11-05 RX ADMIN — DEXAMETHASONE SODIUM PHOSPHATE 8 MG: 4 INJECTION, SOLUTION INTRA-ARTICULAR; INTRALESIONAL; INTRAMUSCULAR; INTRAVENOUS; SOFT TISSUE at 07:42

## 2018-11-05 RX ADMIN — ROCURONIUM BROMIDE 10 MG: 10 INJECTION INTRAVENOUS at 09:20

## 2018-11-05 RX ADMIN — CIPROFLOXACIN 400 MG: 2 INJECTION INTRAVENOUS at 09:21

## 2018-11-05 RX ADMIN — FENTANYL CITRATE 75 MCG: 50 INJECTION, SOLUTION INTRAMUSCULAR; INTRAVENOUS at 07:25

## 2018-11-05 RX ADMIN — IOPAMIDOL 20 ML: 510 INJECTION, SOLUTION INTRAVASCULAR at 09:13

## 2018-11-05 RX ADMIN — ROCURONIUM BROMIDE 50 MG: 10 INJECTION INTRAVENOUS at 07:25

## 2018-11-05 RX ADMIN — WATER 100 ML: 100 IRRIGANT IRRIGATION at 09:13

## 2018-11-05 RX ADMIN — LIDOCAINE HYDROCHLORIDE 60 MG: 20 INJECTION, SOLUTION INFILTRATION; PERINEURAL at 07:25

## 2018-11-05 RX ADMIN — FENTANYL CITRATE 25 MCG: 50 INJECTION, SOLUTION INTRAMUSCULAR; INTRAVENOUS at 07:57

## 2018-11-05 RX ADMIN — SUGAMMADEX 120 MG: 100 INJECTION, SOLUTION INTRAVENOUS at 09:44

## 2018-11-05 RX ADMIN — ONDANSETRON HYDROCHLORIDE 4 MG: 2 INJECTION INTRAMUSCULAR; INTRAVENOUS at 10:08

## 2018-11-05 NOTE — PROGRESS NOTES
SPIRITUAL HEALTH SERVICES  [facility] [address]   PRE-SURGERY VISIT    Had pre-surgery visit with Tim, the pt, his wife Brea, and his son.  Provided spiritual support and prayer.     Laurent Posada  Chaplain Resident  Pager [phone]

## 2018-11-05 NOTE — OP NOTE
ERCP 11/05/2018  7:26 AM [facility], [address] [place] [address] [phone]     Endoscopy Department   _______________________________________________________________________________   Patient Name: Tim Cazares         Procedure Date: 11/5/2018 7:26 AM   MRN: [id number]                       Account Number: [id number]   YOB: 1935               Admit Type: Outpatient   Age: 83                                Gender: Male   Note Status: Finalized                Attending MD: Bravo Barraza MD   Total Sedation Time:                     _______________________________________________________________________________       Procedure:           ERCP   Indications:         Stent change, Bile duct stricture   Providers:           Bravo Barraza MD   Patient Profile:     Mr Cazares is an 82yo gentleman with imaging suggestive                        of cholangiocarcinoma who returns for continued sampling                        and stent exchange by ERCP. Of note, previous biopsies                        including Spybite were negative as were brushings. A                        decision was made today to sample the region of the                        bifurcation by transduodenal FNB with preliminary                        suggesting malignancy, though not definitive diagnosis                        was provided.   Referring MD:        Jesus Alberto Nova MD   Medicines:           General Anesthesia, Cipro 400 mg IV, Indomethacin not                        given due to contraindication   Complications:       No immediate complications.   _______________________________________________________________________________   Procedure:           Pre-Anesthesia Assessment:                        - Prior to the procedure, a History and Physical was                        performed, and patient medications and allergies were                        reviewed. The patient is  competent. The risks and                        benefits of the procedure and the sedation options and                        risks were discussed with the patient. All questions                        were answered and informed consent was obtained. Patient                        identification and proposed procedure were verified by                        the nurse in the pre-procedure area. Mental Status                        Examination: alert and oriented. Airway Examination:                        Mallampati Class II (the uvula but not tonsillar pillars                        visualized). Respiratory Examination: clear to                        auscultation. CV Examination: systolic murmur. ASA Grade                        Assessment: II - A patient with mild systemic disease.                        After reviewing the risks and benefits, the patient was                        deemed in satisfactory condition to undergo the                        procedure. The anesthesia plan was to use general                        anesthesia. Immediately prior to administration of                        medications, the patient was re-assessed for adequacy to                        receive sedatives. The heart rate, respiratory rate,                        oxygen saturations, blood pressure, adequacy of                        pulmonary ventilation, and response to care were                        monitored throughout the procedure. The physical status                        of the patient was re-assessed after the procedure.                        After obtaining informed consent, the scope was passed                        under direct vision. Throughout the procedure, the                        patient's blood pressure, pulse, and oxygen saturations                        were monitored continuously. The duodenoscope was                        introduced through the mouth, and used to inject                        " contrast into and used to inject contrast into the bile                        duct. The ERCP was accomplished without difficulty. The                        patient tolerated the procedure well.                                                                                     Findings:        A  film demonstrated cholecystectomy clips and the two biliary        stents in apparent stable position. Limited white light views of the        foregut were notable for these two stents appearing partially occluded        across a widely patent biliary sphincterotomy. The pancreatic stent had        spontaneously ejected. The two biliary stents were removed from the        biliary tree using a snare. The bile duct was deeply cannulated with the        12 mm balloon in concert with multiple 0.025\" Visiglide wires. Contrast        was injected and I personally interpreted the bile duct images. Ductal        flow of contrast was adequate, image quality was excellent and contrast        extended throughout the intrahepatics. As previous, there was a complex        stenosis(es) involving the bifurcation and each of the primary        insertions (Bismuth IV) with mild to moderate upstream dilation,        involving the left more so than the right intrahepatics. The common duct        downstream also suggested mild mid duct narrowing. The biliary tree was        swept with a 12 mm balloon starting at the bifurcation and sludge and        stone derbis were swept clear from the duct. The bile duct was explored        endoscopically using the cholangioscope. In the locations of the        stenoses seen on fluoroscopy, there was evidence of frond-like growths,        suggesstive of malginancy. The bifurcation was sampled by Spybite and        subsequently over the wire forceps bites. Cells for cytology were        obtained by brushing in the hepatic duct bifurcation as well. A 10 Fr by        22 cm stent with no external flaps " and no internal flaps was placed 21        cm into the left intrahepatics. A 10 Fr by 16 cm stent with no external        flaps and no internal flaps was placed 15 cm into the right        intrahepatics. Bile flowed through the stents and the stents were in        good position. The ventral pancreatic duct was selectively not        interrogated.                                                                                     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) with left grater than right                        intrahepatic dilation, now with cholangioscopy                        suggesting frond-like growth suggestive of                        cholangiocarinoma                        - Subtle common duct stenosis, seen on cholangioscopy                        with mild frond-like involvement                        - Successful removal of stone debris from the common,                        likely stent related                        - Uncomplicated sampling of the bifrucation using direct                        vision Spybite, over the wire foreceps and over the wire                        brushing                        - Successful placement of two transpapillary                        intrahepatic stents, one 10F to the left and one 10F to                        the right as above   Recommendation:      - Standard outpatient general anesthesia recovery with                        probable discharge home this morning                        - Complete a short course of antibiotic as prescribed                        - Avoid antithrombotics for at least three days                        - See associated EUS note                         - Awaiting biopsy and brushing results; if positive, we                        will refer to medical oncology and if negative, we will                        need to consider our options as suspicion of                        cholangiocarcinoma is very high and tissue required;                        possibilities include percutaneous CT guided targeting                        and repeat EUS/ERCP for sampling, all the while                        respecting the risk of tract seeding                        - ERCP for stent exchange, possibly to metal pending the                        clinical data in 10w unless repeat EUS/ERCP needed for                        sampling which should occur within 4w                        - The findings and recommendations were discussed with                        the patient and their family                                                                                       electronically signed by DAISY Barraza      Upper EUS 11/05/2018  7:25 AM [address] [place] [address] [phone]     Endoscopy Department   _______________________________________________________________________________   Patient Name: Tim Cazares         Procedure Date: 11/5/2018 7:25 AM   MRN: [id number]                       Account Number: [id number]   YOB: 1935               Admit Type: Outpatient   Age: 83                               Room: OR   Gender: Male                          Note Status: Finalized   Attending MD: Bravo Barraza MD   Total Sedation Time:   _______________________________________________________________________________       Procedure:           Upper EUS   Indications:         Suspected mass in liver on MRCP   Providers:           Bravo Barraza MD   Patient Profile:     Mr Cazares is an 82yo gentleman with imaging suggestive                        of cholangiocarcinoma, who had an ERCP with brushing  and                        cholangosocpic biopsies that were negative. He now                        proceeds to EUS for possible sampling of periportal                        lymph nodes, or perhaps the lesion itself. A clear                        discussion was had with the family about the risk of                        tract seeding, however it was decided that if                        appropriate and required this would be the least                        invasive means to reach the primary lesion and obtain                        tissue as previous ERCP techniques utiling                        cholangioscopy did not provide positive findings.   Referring MD:        Jesus Alberto Nova MD   Medicines:           General Anesthesia   Complications:       No immediate complications.   _______________________________________________________________________________   Procedure:           Pre-Anesthesia Assessment:                        - Prior to the procedure, a History and Physical was                        performed, and patient medications and allergies were                        reviewed. The patient is competent. The risks and                        benefits of the procedure and the sedation options and                        risks were discussed with the patient. All questions                        were answered and informed consent was obtained. Patient                        identification and proposed procedure were verified by                        the nurse in the pre-procedure area. Mental Status                        Examination: alert and oriented. Airway Examination:                        Mallampati Class II (the uvula but not tonsillar pillars                        visualized). Respiratory Examination: clear to                        auscultation. CV Examination: systolic murmur. ASA Grade                        Assessment: II - A patient with mild systemic disease.                        After  reviewing the risks and benefits, the patient was                        deemed in satisfactory condition to undergo the                        procedure. The anesthesia plan was to use general                        anesthesia. Immediately prior to administration of                        medications, the patient was re-assessed for adequacy to                        receive sedatives. The heart rate, respiratory rate,                        oxygen saturations, blood pressure, adequacy of                        pulmonary ventilation, and response to care were                        monitored throughout the procedure. The physical status                        of the patient was re-assessed after the procedure.                        After obtaining informed consent, the endoscope was                        passed under direct vision. Throughout the procedure,                        the patient's blood pressure, pulse, and oxygen                        saturations were monitored continuously. The                        echoendoscope was introduced through the mouth, and                        advanced to the second part of duodenum. The upper EUS                        was accomplished without difficulty. The patient                        tolerated the procedure well.                                                                                     Findings:        White light and endosonographic findings :        A curved linear was utilized throughout. Limited white light views of        the foregut were notable only for the two biliary stents internalized        across a patent biliary sphincterotomy. These stents were traced from        ampulla upstream to the bifurcation. The biliary wall was symmetrically        thickened across the length of the common and at the location of the        bifurcation, a round, ill defined hypoechoic mass was found measuring        12.3mm by 10.8mm in maximal cross-sectional  diameter. There was        sonographic evidence suggesting invasion into the surrounding hepatic        parenchyma and the mass appeared to invade the full thickness of the        bile duct at the bifurcation. While there were periportal lymph nodes,        one slightly over 10mm, all the nodes appeared grossly benign and        reactive, with fuzzy borders, hypoechoic centers, and isoechoic        echogenicity. There was no other abdominal lymphadenopathy. The major        abdominal vasculature appeared traditional and remarkable for age        related changes, though not with the suspected mass. There was no        evidence of synchronous lesion of the pancreatic parenchyma or the        viewed liver parenchyma. There was mild upstream dilation of        intrahepatics, left greater than right. The gallbladder was absent. Fine        needle biopsy was performed. Color Doppler imaging was utilized prior to        needle puncture to confirm a lack of significant vascular structures        within the needle path. Two passes were made with the 22 gauge        ultrasound biopsy needle using a transduodenal approach. Further passes        were avoided given risks of seeding and evidence of slight hematoma        formation. A visible core of tissue was obtained with each. Preliminary        cytologic examination and touch preps were performed. The cellularity of        the specimen was adequate. Final cytology results are pending.                                                                                     Impression:          A 1cm hypoechoic, ill defined mass was found at the                        bifurcation with evidence of growth spanning the bile                        duct montalvo as well as with the adjacent liver                        parenchyma. There was mild upstream dilation despite the                        well positioned biliary stents. There was focal                        lymphadenopathy in the  periportal region, however these                        appeared benign. No other lymphadenopathy was                        demonstrated and there was only age related changes to                        the major vasculature. A decision was made to sample the                        primary lesion (as noted above) using two passes of an                        FNB needle. Preliminary findings were concerning for                        malignancy though final diagnosis is pending. No                        synchronous lesions were demonstrated.   Recommendation:      - Proceed to ERCP as planned, to exchange plastic stents                        and sample the stenoses again using cholangioscopy and                        other various devices                        - Dr Barraza to communicate the results of sampling;                        please see the associated ERCP note for the clinical                        pathway that will be followed pending the results                        - Avoid antithrombotics for at least three days                        - The findings and recommendations were discussed with                        the patient and their family                                                                                       electronically signed by DAISY Barraza

## 2018-11-05 NOTE — IP AVS SNAPSHOT
[facility] Day [facility] [address] [address]    Phone:  [phone]                                       After Visit Summary   11/5/2018    Tim Cazares    MRN: [id number]           After Visit Summary Signature Page     I have received my discharge instructions, and my questions have been answered. I have discussed any challenges I see with this plan with the nurse or doctor.    ..........................................................................................................................................  Patient/Patient Representative Signature      ..........................................................................................................................................  Patient Representative Print Name and Relationship to Patient    ..................................................               ................................................  Date                                   Time    ..........................................................................................................................................  Reviewed by Signature/Title    ...................................................              ..............................................  Date                                               Time          22EPIC Rev 08/18

## 2018-11-05 NOTE — ANESTHESIA POSTPROCEDURE EVALUATION
Anesthesia POST Procedure Evaluation    Patient: Tim Cazares   MRN:     [id number] Gender:   male   Age:    83 year old :      1935        Preoperative Diagnosis: Pancreatic Mass    Procedure(s):  Upper Endoscopic Ultrasound with fine needle biopsy, Endoscopic retrograde cholangiopancreatography with spyglass cholangioscopy, biliary biopsies, brushings, and stent exchange  Endoscopic Retrograde Cholangiopancreatogram    Postop Comments: No value filed.       Anesthesia Type:  General    Reportable Event: NO     PAIN: Uncomplicated   Sign Out status: Comfortable, Well controlled pain     PONV: No PONV   Sign Out status:  No Nausea or Vomiting     Neuro/Psych: Uneventful perioperative course   Sign Out Status: Preoperative baseline; Age appropriate mentation     Airway/Resp.: Uneventful perioperative course   Sign Out Status: Non labored breathing, age appropriate RR; Resp. Status within EXPECTED Parameters     CV: Uneventful perioperative course   Sign Out status: Appropriate BP and perfusion indices; Appropriate HR/Rhythm     Disposition:   Sign Out in:  PACU  Disposition:  Phase II; Home  Recovery Course: Uneventful  Follow-Up: Not required           Last Anesthesia Record Vitals:  CRNA VITALS  2018 0925 - 2018 1025      2018             Pulse: 71    SpO2: 98 %    Resp Rate (observed): (!)  1          Last PACU/Preop Vitals:  Vitals:    18 0536 18 1000 18 1015   BP: 148/75 126/62 134/64   Pulse: 55     Resp: 16 12 12   Temp: 36.8  C (98.3  F) 36.4  C (97.6  F)    SpO2: 100% 100% 100%         Electronically Signed By: Lakhwinder Pang MD, 2018, 10:32 AM

## 2018-11-05 NOTE — IP AVS SNAPSHOT
MRN:[id number]                      After Visit Summary   11/5/2018    Tim Cazares    MRN: [id number]           Thank you!     Thank you for choosing [facility] for your care. Our goal is always to provide you with excellent care. Hearing back from our patients is one way we can continue to improve our services. Please take a few minutes to complete the written survey that you may receive in the mail after you visit with us. Thank you!        Patient Information     Date Of Birth          1935        About your hospital stay     You were admitted on:  November 5, 2018 You last received care in the:  Same Day Surgery [facility]    You were discharged on:  November 5, 2018       Who to Call     For medical emergencies, please call 911.  For non-urgent questions about your medical care, please call your primary care provider or clinic, [phone]  For questions related to your surgery, please call your surgery clinic        Attending Provider     Provider Specialty    Bravo Barraza MD Gastroenterology       Primary Care Provider Office Phone # Fax #    Dmitriy Wie [phone]      After Care Instructions     Discharge Instructions       Resume pre procedure diet            Discharge Instructions       Restart home medications.                  Further instructions from your care team       [facility]  Same-Day Surgery   Adult Discharge Orders & Instructions     For 24 hours after surgery    1. Get plenty of rest.  A responsible adult must stay with you for at least 24 hours after you leave the hospital.   2. Do not drive or use heavy equipment.  If you have weakness or tingling, don't drive or use heavy equipment until this feeling goes away.  3. Do not drink alcohol.  4. Avoid strenuous or risky activities.  Ask for help when climbing stairs.   5. You may feel lightheaded.  IF so, sit for a few minutes before  "standing.  Have someone help you get up.   6. If you have nausea (feel sick to your stomach): Drink only clear liquids such as apple juice, ginger ale, broth or 7-Up.  Rest may also help.  Be sure to drink enough fluids.  Move to a regular diet as you feel able.  7. You may have a slight fever. Call the doctor if your fever is over 100.5 F (37.7 C) (taken under the tongue) or lasts longer than 24 hours.  8. You may have a dry mouth, a sore throat, muscle aches or trouble sleeping.  These should go away after 24 hours.  9. Do not make important or legal decisions.   Call your doctor for any of the followin.  Signs of infection (fever, growing tenderness at the surgery site, a large amount of drainage or bleeding, severe pain, foul-smelling drainage, redness, swelling).    2. It has been over 8 to 10 hours since surgery and you are still not able to urinate (pass water).    3.  Headache for over 24 hours.      To contact a doctor, call Dr Barraza at [phone] (clinic) or:        [phone] and ask for the resident on call for Gastroenterology (answered 24 hours a day)      Emergency Department:    [facility]: [phone]       (TTY for hearing impaired: [phone])        Pending Results     Date and Time Order Name Status Description    2018 1007 Cytology non gyn In process     2018 0556 EKG 12-lead, tracing only Preliminary             Admission Information     Date & Time Provider Department Dept. Phone    2018 Bravo Barraza MD Same Day Surgery [facility] [phone]      Your Vitals Were     Blood Pressure Pulse Temperature Respirations Height Weight    122/61 55 97.8  F (36.6  C) (Oral) 16 1.651 m (5' 5\") 63.5 kg (139 lb 15.9 oz)    Pulse Oximetry BMI (Body Mass Index)                96% 23.3 kg/m2          MyChart Information     [email or web address] lets you send messages to your doctor, view your test results, renew your prescriptions, schedule appointments and more. To " "sign up, go to [email or web address] . Click on \"Log in\" on the left side of the screen, which will take you to the Welcome page. Then click on \"Sign up Now\" on the right side of the page.     You will be asked to enter the access code listed below, as well as some personal information. Please follow the directions to create your username and password.     Your access code is: [email or web address]  Expires: 2019 10:43 AM     Your access code will  in 90 days. If you need help or a new code, please call your [facility] clinic or [phone].        Care EveryWhere ID     This is your Care EveryWhere ID. This could be used by other organizations to access your [facility] medical records  [id number]        Equal Access to Services     MEMO SORENSEN : Brittni Chaudhari, keerthi barros, marcia perdue, edwige adkins . So [facility] [place] [phone].    ATENCIÓN: Si habla español, tiene a kim disposición servicios gratuitos de asistencia lingüística. Quan al [phone].    We comply with applicable federal civil rights laws and [place] laws. We do not discriminate on the basis of race, color, national origin, age, disability, sex, sexual orientation, or gender identity.               Review of your medicines      CONTINUE these medicines which may have CHANGED, or have new prescriptions. If we are uncertain of the size of tablets/capsules you have at home, strength may be listed as something that might have changed.        Dose / Directions    * ciprofloxacin 500 MG tablet   Commonly known as:  CIPRO   Indication:  post-ERCP prophylaxis   This may have changed:  Another medication with the same name was added. Make sure you understand how and when to take each.   Used for:  Bile duct stricture        Dose:  500 mg   Take 1 tablet (500 mg) by mouth every 12 hours   Quantity:  9 tablet   Refills:  0       * ciprofloxacin 500 MG tablet   Commonly known as:  CIPRO   This may have " changed:  You were already taking a medication with the same name, and this prescription was added. Make sure you understand how and when to take each.   Used for:  Bile duct stricture        Dose:  500 mg   Take 1 tablet (500 mg) by mouth 2 times daily for 5 days   Quantity:  10 tablet   Refills:  0       * Notice:  This list has 2 medication(s) that are the same as other medications prescribed for you. Read the directions carefully, and ask your doctor or other care provider to review them with you.      CONTINUE these medicines which have NOT CHANGED        Dose / Directions    AMLODIPINE BESYLATE PO        Dose:  5 mg   Take 5 mg by mouth daily   Refills:  0       FUROSEMIDE PO        Dose:  20 mg   Take 20 mg by mouth M - W - friday   Refills:  0       LOSARTAN POTASSIUM PO        Dose:  100 mg   Take 100 mg by mouth daily   Refills:  0       METOPROLOL SUCCINATE ER PO        Dose:  50 mg   Take 50 mg by mouth 2 times daily   Refills:  0       PANTOPRAZOLE SODIUM PO        Dose:  40 mg   Take 40 mg by mouth every morning (before breakfast)   Refills:  0       ZANTAC PO        Dose:  300 mg   Take 300 mg by mouth At Bedtime   Refills:  0            Where to get your medicines      These medications were sent to [facility] [place], [place] - [address]  [address] [address]     Phone:  [phone]     ciprofloxacin 500 MG tablet                Protect others around you: Learn how to safely use, store and throw away your medicines at www.disposemymeds.org.        ANTIBIOTIC INSTRUCTION     You've Been Prescribed an Antibiotic - Now What?  Your healthcare team thinks that you or your loved one might have an infection. Some infections can be treated with antibiotics, which are powerful, life-saving drugs. Like all medications, antibiotics have side effects and should only be used when necessary. There are some important things you should know about your antibiotic  treatment.      Your healthcare team may run tests before you start taking an antibiotic.    Your team may take samples (e.g., from your blood, urine or other areas) to run tests to look for bacteria. These test can be important to determine if you need an antibiotic at all and, if you do, which antibiotic will work best.      Within a few days, your healthcare team might change or even stop your antibiotic.    Your team may start you on an antibiotic while they are working to find out what is making you sick.    Your team might change your antibiotic because test results show that a different antibiotic would be better to treat your infection.    In some cases, once your team has more information, they learn that you do not need an antibiotic at all. They may find out that you don't have an infection, or that the antibiotic you're taking won't work against your infection. For example, an infection caused by a virus can't be treated with antibiotics. Staying on an antibiotic when you don't need it is more likely to be harmful than helpful.      You may experience side effects from your antibiotic.    Like all medications, antibiotics have side effects. Some of these can be serious.    Let you healthcare team know if you have any known allergies when you are admitted to the hospital.    One significant side effect of nearly all antibiotics is the risk of severe and sometimes deadly diarrhea caused by Clostridium difficile (C. Difficile). This occurs when a person takes antibiotics because some good germs are destroyed. Antibiotic use allows C. diificile to take over, putting patients at high risk for this serious infection.    As a patient or caregiver, it is important to understand your or your loved one's antibiotic treatment. It is especially important for caregivers to speak up when patients can't speak for themselves. Here are some important questions to ask your healthcare team.    What infection is this  antibiotic treating and how do you know I have that infection?    What side effects might occur from this antibiotic?    How long will I need to take this antibiotic?    Is it safe to take this antibiotic with other medications or supplements (e.g., vitamins) that I am taking?     Are there any special directions I need to know about taking this antibiotic? For example, should I take it with food?    How will I be monitored to know whether my infection is responding to the antibiotic?    What tests may help to make sure the right antibiotic is prescribed for me?      Information provided by:  www.cdc.gov/getsmart  U.S. Department of Health and Human Services  Centers for disease Control and Prevention  National Center for Emerging and Zoonotic Infectious Diseases  Division of Healthcare Quality Promotion             Medication List: This is a list of all your medications and when to take them. Check marks below indicate your daily home schedule. Keep this list as a reference.      Medications           Morning Afternoon Evening Bedtime As Needed    AMLODIPINE BESYLATE PO   Take 5 mg by mouth daily                                * ciprofloxacin 500 MG tablet   Commonly known as:  CIPRO   Take 1 tablet (500 mg) by mouth every 12 hours                                * ciprofloxacin 500 MG tablet   Commonly known as:  CIPRO   Take 1 tablet (500 mg) by mouth 2 times daily for 5 days                                FUROSEMIDE PO   Take 20 mg by mouth M - W - friday                                LOSARTAN POTASSIUM PO   Take 100 mg by mouth daily                                METOPROLOL SUCCINATE ER PO   Take 50 mg by mouth 2 times daily                                PANTOPRAZOLE SODIUM PO   Take 40 mg by mouth every morning (before breakfast)                                ZANTAC PO   Take 300 mg by mouth At Bedtime                                * Notice:  This list has 2 medication(s) that are the same as other  medications prescribed for you. Read the directions carefully, and ask your doctor or other care provider to review them with you.

## 2018-11-05 NOTE — OR NURSING
"Dr. Barraza at bedside in the PACU. He would still like to see Tim again with his family before he leaves.  Denies discomfort. \"only a little nausea.\"  "

## 2018-11-05 NOTE — LETTER
Patient:  Jaime Grimaldo  :   1935  MRN:     [id number]        Mr.Lawrence XAVIER Grimaldo  [address] [facility] [address]        2018    Dear ,    We are writing to inform you of your test results. In short, the brushings returned atypical (not normal but not suspicious for malignancy either) and the fine needle biopsy returned benign appearing cells. We still are awaiting the forceps biopsy results. I plan to phone with these when available, and if these are also negative we will need to consider other options for tissue acquisition.     I have included the formal documtentation of the results below. It continues to be a pleasure participating in your care.  Please feel free to contact our clinic with any further questions.      Sincerely,    Bravo Motley MD PhD  Director of Endoscopy  Associate Professor of Medicine, Surgery and Pediatrics  Interventional and Therapeutic Endoscopy    [facility]  Division of Gastroenterology and Hepatology  [facility] [address] [address]    New Consultations  [phone]  Procedure Scheduling [phone]  Clinical Nurse Coordinator [phone]  Clinical Fax   [phone]  Administrative   [phone]  Administrative Fax  [phone]    Resulted Orders   Fine needle aspiration   Result Value Ref Range    Copath Report       Patient Name: JAIME GRIMALDO  MR#: [id number]  Specimen #: [id number]  Collected: 2018  Received: 2018  Reported: 2018 13:33  Ordering Phy(s): BRAVO MOTLEY    For improved result formatting, select 'View Enhanced Report Format' under   Linked Documents section.    SPECIMEN/STAIN PROCESS:  Liver, Biliary bifurcation, endoscopic ultrasound guided fine needle   aspiration       Diff Quick Stain-cyto x 2, Cell Block w/ H&E-Cyto x 1, Save Ribbon, 1   x 1, Cell Block, Level 2 x 1,  Save Ribbon, 2 x 1, Cell Block, Level 3 x  1    ----------------------------------------------------------------    CYTOLOGIC INTERPRETATION:     Liver, Biliary bifurcation, endoscopic ultrasound guided fine needle   aspiration:   Negative for malignancy  Specimen Adequacy: Satisfactory for evaluation.    I have personally reviewed all specimens and/or slides, including the   listed special stains, and used them  with my medical judgement to determine or confirm the final diagnosis.    Michelle ctronically signed out by:  Gabo Rainey M.D., Physicians    Processed and screened at [facility]    CLINICAL HISTORY:  Patient presented with a biliary stricture and pancreatic mass.    ,    GROSS:  Liver, Biliary bifurcation, endoscopic ultrasound guided fine needle   aspiration:  Received 2 air dried slides,  processed for Diff Quik stain, and material in formalin, processed for one   hematoxylin stained cell block.    INTRAOPERATIVE CONSULTATION:    FNA Performance: Fine needle aspiration was not performed by [facility],    Pathology staff.    Immediate Adequacy: On site specimen adequacy evaluation was performed by   the Cytology Resident OMARI Teixeira MD    Onsite adequacy/interpretation:  Pass A1: Adequate, Pass A2: Inadequate    MICROSCOPIC:  Microscopic review is performed.  Fragments of benign glandular epithelium.  Donna Atkins MD (Cytopathology fellow); Gabo Rainey MD   (Attending)    CPT Codes:   54986-QRLE IMQCT  A: 81161-SKOS, 46533-PSU, HCB    TESTING LAB LOCATION:  [facility], [address]   [address] [phone]    COLLECTION SITE:  Client:  [facility]  Location:  [facility] (B)    Resident  [id number]     Cytology non gyn   Result Value Ref Range    Copath Report       Patient Name: JAIME GRIMALDO  MR#: [id number]  Specimen #: [id number]  Collected: 11/5/2018  Received:  11/5/2018  Reported: 11/6/2018 15:34  Ordering Phy(s): ROGELIO MOTLEY    For improved result formatting, select 'View Enhanced Report Format' under   Linked Documents section.    SPECIMEN/STAIN PROCESS:  GI tract brush fluid, bile duct       Pap-Cyto x 1    ----------------------------------------------------------------    CYTOLOGIC INTERPRETATION:    GI tract brush fluid, bile duct:  - Atypical cells present.  (See comment)  Specimen Adequacy: Satisfactory for evaluation.    COMMENT:  There is some nuclear irregularities and rare nucleolar prominence.    Correlation with associated biopsy,  [id number].    I have personally reviewed all specimens and/or slides, including the   listed special stains, and used them  with my medical judgement to determine or confirm the final diagnosis.    Electronically signed out by:  Gabo Rainey M.D., [facility]    Processed and scre ened at [facility]    CLINICAL HISTORY:  Patient presented with a biliary stricture and pancreatic mass.    ,    GROSS:  GI tract brush fluid, bile duct:  Received 20 ml of red/orange, clear   fluid with brush, processed as 1 Pap  stained [employer]..    MICROSCOPIC:  Microscopic examination is performed.    Nicko Teixeira MD (PGY-3); Gabo Rainey MD (Attending)    CPT Codes:  A: 45387-IWJKIRJ    TESTING LAB LOCATION:  [facility], [address]   [address] [phone]    COLLECTION SITE:  Client:  [facility]  Location:  [facility] (B)    Resident  [id number]

## 2018-11-05 NOTE — DISCHARGE INSTRUCTIONS
General acute hospital  Same-Day Surgery   Adult Discharge Orders & Instructions     For 24 hours after surgery    1. Get plenty of rest.  A responsible adult must stay with you for at least 24 hours after you leave the hospital.   2. Do not drive or use heavy equipment.  If you have weakness or tingling, don't drive or use heavy equipment until this feeling goes away.  3. Do not drink alcohol.  4. Avoid strenuous or risky activities.  Ask for help when climbing stairs.   5. You may feel lightheaded.  IF so, sit for a few minutes before standing.  Have someone help you get up.   6. If you have nausea (feel sick to your stomach): Drink only clear liquids such as apple juice, ginger ale, broth or 7-Up.  Rest may also help.  Be sure to drink enough fluids.  Move to a regular diet as you feel able.  7. You may have a slight fever. Call the doctor if your fever is over 100.5 F (37.7 C) (taken under the tongue) or lasts longer than 24 hours.  8. You may have a dry mouth, a sore throat, muscle aches or trouble sleeping.  These should go away after 24 hours.  9. Do not make important or legal decisions.   Call your doctor for any of the followin.  Signs of infection (fever, growing tenderness at the surgery site, a large amount of drainage or bleeding, severe pain, foul-smelling drainage, redness, swelling).    2. It has been over 8 to 10 hours since surgery and you are still not able to urinate (pass water).    3.  Headache for over 24 hours.      To contact a doctor, call Dr Barraza at [phone] (clinic) or:        [phone] and ask for the resident on call for Gastroenterology (answered 24 hours a day)      Emergency Department:    [facility]: [phone]       (TTY for hearing impaired: [phone])

## 2018-11-05 NOTE — ANESTHESIA CARE TRANSFER NOTE
Patient: Tim Cazares    Procedure(s):  Upper Endoscopic Ultrasound with fine needle biopsy, Endoscopic retrograde cholangiopancreatography with spyglass cholangioscopy, biliary biopsies, brushings, and stent exchange  Endoscopic Retrograde Cholangiopancreatogram     Diagnosis: Pancreatic Mass   Diagnosis Additional Information: No value filed.    Anesthesia Type:   No value filed.     Note:  Airway :Face Mask  Patient transferred to:PACU  Comments: VSS.  Report given to RN.Handoff Report: Identifed the Patient, Identified the Reponsible Provider, Reviewed the pertinent medical history, Discussed the surgical course, Reviewed Intra-OP anesthesia mangement and issues during anesthesia, Set expectations for post-procedure period and Allowed opportunity for questions and acknowledgement of understanding      Vitals: (Last set prior to Anesthesia Care Transfer)    CRNA VITALS  11/5/2018 0925 - 11/5/2018 1002      11/5/2018             Pulse: 71    SpO2: 100    Resp Rate (observed)  BP 12  126/62                  Electronically Signed By: ARMAAN Silveira CRNA  November 5, 2018  10:02 AM

## 2018-11-05 NOTE — LETTER
Patient:  Jaime Grimaldo  :   1935  MRN:     [id number]        Mr.Lawrence XAVIER Grimaldo  [address] [facility] [address]        2018    Dear ,    We are writing to inform you of your test results. In short, as we discussed on the phone your results again did not demonstrated malignancy. While this is good news, I remained concerned and therefore our plan is to have you return and have my partner attempt sampling by EUS in tandem with your planned ERCP.     I have included the formal documtentation of the results below. It continues to be a pleasure participating in your care.  Please feel free to contact our clinic with any further questions.      Sincerely,    Bravo Motley MD PhD  Director of Endoscopy  Associate Professor of Medicine, Surgery and Pediatrics  Interventional and Therapeutic Endoscopy    [facility]  Division of Gastroenterology and Hepatology  [facility] [address] [place], [place] [address]    New Consultations  [phone]  Procedure Scheduling [phone]  Clinical Nurse Coordinator [phone]  Clinical Fax   [phone]  Administrative   [phone]  Administrative Fax  [phone]    Resulted Orders   Fine needle aspiration   Result Value Ref Range    Copath Report       Patient Name: JAIME GRIMALDO  MR#: [id number]  Specimen #: [id number]  Collected: 2018  Received: 2018  Reported: 2018 13:33  Ordering Phy(s): BRAVO MOTLEY    For improved result formatting, select 'View Enhanced Report Format' under   Linked Documents section.    SPECIMEN/STAIN PROCESS:  Liver, Biliary bifurcation, endoscopic ultrasound guided fine needle   aspiration       Diff Quick Stain-cyto x 2, Cell Block w/ H&E-Cyto x 1, Save Ribbon, 1   x 1, Cell Block, Level 2 x 1,  Save Ribbon, 2 x 1, Cell Block, Level 3 x 1    ----------------------------------------------------------------    CYTOLOGIC  INTERPRETATION:     Liver, Biliary bifurcation, endoscopic ultrasound guided fine needle   aspiration:   Negative for malignancy  Specimen Adequacy: Satisfactory for evaluation.    I have personally reviewed all specimens and/or slides, including the   listed special stains, and used them  with my medical judgement to determine or confirm the final diagnosis.    Michelle ctronically signed out by:  Gabo Rainey M.D., Physicians    Processed and screened at [facility]    CLINICAL HISTORY:  Patient presented with a biliary stricture and pancreatic mass.    ,    GROSS:  Liver, Biliary bifurcation, endoscopic ultrasound guided fine needle   aspiration:  Received 2 air dried slides,  processed for Diff Quik stain, and material in formalin, processed for one   hematoxylin stained cell block.    INTRAOPERATIVE CONSULTATION:    FNA Performance: Fine needle aspiration was not performed by [facility],    Pathology staff.    Immediate Adequacy: On site specimen adequacy evaluation was performed by   the Cytology Resident OMARI Teixeira MD    Onsite adequacy/interpretation:  Pass A1: Adequate, Pass A2: Inadequate    MICROSCOPIC:  Microscopic review is performed.  Fragments of benign glandular epithelium.  Donna Atkins MD (Cytopathology fellow); Gabo Rainey MD   (Attending)    CPT Codes:   63275-HVQX IMQCT  A: 21833-NXER, 09862-ARH, HCB    TESTING LAB LOCATION:  [facility], [address]   [id number] [phone]    COLLECTION SITE:  Client:  [facility]  Location:  [facility] ([facility])    Resident  [id number]     Surgical pathology exam   Result Value Ref Range    Copath Report       Patient Name: JAIME GRIMALDO  MR#: [id number]  Specimen #: [id number]  Collected: 11/5/2018  Received: 11/5/2018  Reported: 11/8/2018 13:38  Ordering Phy(s): ROGELIO MOTLEY    For  "improved result formatting, select 'View Enhanced Report Format' under   Linked Documents section.    SPECIMEN(S):  A: Bile duct bifurcation biopsies, spybite  B: Bile duct bifurcation biopsies    FINAL DIAGNOSIS:  A. Bile duct bifurcation, fine needle biopsy:  - Biliary epithelium with mild atypia and associated mixed inflammation    B. Bile duct bifurcation, fine needle biopsy:  - Detached atypical cells with moderate atypia and acute inflammation,   reactive versus dysplastic, see comment  - Additional fragments of biliary epithelium with mild atypia and acute   inflammation    COMMENT:  Specimen B shows detached predominantly cuboidal cells with moderate   atypia, minimal mitotic activity and  associated acute inflammation.  There are additional fragments of mildly   atypical cells with pencillat e  nuclei, rare mitoses and acute inflammation.  The differential diagnosis   includes reactive atypia versus  dysplasia.    Please correlate with corresponding cytology specimens [id number] and   [id number].    Parts A and B were seen in consultation with Dr. Ferguson, Dr. Desir and   Dr. Torres.    I have personally reviewed all specimens and/or slides, including the   listed special stains, and used them  with my medical judgement to determine or confirm the final diagnosis.    Electronically signed out by:    Jenniffer Mensah M.D., [facility]    CLINICAL HISTORY:  Per upper EUS report (11/05/18): a 1cm hypoechoic, ill defined mass at the   bifurcation with evidence of growth  spanning the bile duct walls as well as with the adjacent liver   parenchyma.  Patient undergoes ERCP for stent  change and bile duct stricture evaluation.  GROSS:  A:  The specimen is received in formalin with proper patient   identification, labeled \"bile duct bifurcation  biopsies (spybite-these biopsies a re very small)\".  The specimen consists   of three segments of tan-white soft  tissue averaging 0.1 cm in greatest dimension.  It is " "wrapped in entirely   submitted in cassette A1.    B:  The specimen is received in formalin with proper patient   identification, labeled \"bile duct bifurcation  biopsies\".  The specimen consists of seven segments of tan-red soft tissue   averaging 0.1 cm in greatest  dimension.  It is wrapped in entirely submitted in cassette B1. (Dictated   by: Carlita MCCANN [facility] 11/5/2018  11:49 AM)    MICROSCOPIC:  Microscopic examination was performed. Additional levels were examined on   parts A and B.    CPT Codes:  A: 55457-ZR5  B: 36200-ZO2    TESTING LAB LOCATION:  [facility], [address]   [address] [phone]    COLLECTION SITE:  Client: [facility]  Location: [facility] ([facility])    Resident  [email or web address]     Cytology non gyn   Result Value Ref Range    Copath Report       Patient Name: JAIME GRIMALDO  MR#: [id number]  Specimen #: [id number]  Collected: 11/5/2018  Received: 11/5/2018  Reported: 11/6/2018 15:34  Ordering Phy(s): ROGELIO MOTLEY    For improved result formatting, select 'View Enhanced Report Format' under   Linked Documents section.    SPECIMEN/STAIN PROCESS:  GI tract brush fluid, bile duct       Pap-Cyto x 1    ----------------------------------------------------------------    CYTOLOGIC INTERPRETATION:    GI tract brush fluid, bile duct:  - Atypical cells present.  (See comment)  Specimen Adequacy: Satisfactory for evaluation.    COMMENT:  There is some nuclear irregularities and rare nucleolar prominence.    Correlation with associated biopsy,  [id number].    I have personally reviewed all specimens and/or slides, including the   listed special stains, and used them  with my medical judgement to determine or confirm the final diagnosis.    Electronically signed out by:  Gabo Rainey M.D., [facility]    Processed and scre ened at [facility], " [facility]    CLINICAL HISTORY:  Patient presented with a biliary stricture and pancreatic mass.    ,    GROSS:  GI tract brush fluid, bile duct:  Received 20 ml of red/orange, clear   fluid with brush, processed as 1 Pap  stained Autocyte..    MICROSCOPIC:  Microscopic examination is performed.    Nicko Teixeira MD (PGY-3); Gabo Rainey MD (Attending)    CPT Codes:  A: 76825-XXGNATG    TESTING LAB LOCATION:  [facility], [address]   [id number] [phone]    COLLECTION SITE:  Client:  [facility]  Location:  [facility] ([facility])    Resident  [id number]

## 2018-11-05 NOTE — OR NURSING
Dr. Barraza (gastroenterolgy) at bedside in phase II. Discussed procedure and plan of care in detail with patient and patient's family members.

## 2018-11-05 NOTE — ANESTHESIA PREPROCEDURE EVALUATION
Anesthesia Pre-Procedure Evaluation    Patient: Tim Cazares   MRN:     [id number] Gender:   male   Age:    83 year old :      1935        Preoperative Diagnosis: Pancreatic Mass    Procedure(s):  Upper Endoscopic Ultrasound   Endoscopic Retrograde Cholangiopancreatogram      Past Medical History:   Diagnosis Date     CKD (chronic kidney disease)      GERD (gastroesophageal reflux disease)      Hypertension       Past Surgical History:   Procedure Laterality Date     CHOLECYSTECTOMY       ENDOSCOPIC RETROGRADE CHOLANGIOPANCREATOGRAM N/A 10/9/2018    Procedure: COMBINED ENDOSCOPIC RETROGRADE CHOLANGIOPANCREATOGRAPHY, PLACE TUBE/STENT;;  Surgeon: Bravo Barraza MD;  [facility]     ENDOSCOPIC ULTRASOUND UPPER GASTROINTESTINAL TRACT (GI) N/A 10/9/2018    Procedure: ENDOSCOPIC ULTRASOUND, ESOPHAGOSCOPY / UPPER GASTROINTESTINAL TRACT (GI);   Endoscopic Retrograde Cholangiopancreatogram, pancreatic stent placement, biliary sphincterotomy, cholangioscopy, brushings and biopsies, biliary stent placement;  Surgeon: Bravo Barraza MD;  Location: [facility] OR     HERNIA REPAIR       PROSTATE SURGERY            Anesthesia Evaluation     . Pt has had prior anesthetic. Type: General           ROS/MED HX    ENT/Pulmonary:  - neg pulmonary ROS     Neurologic:  - neg neurologic ROS     Cardiovascular:         METS/Exercise Tolerance:     Hematologic:  - neg hematologic  ROS       Musculoskeletal:   (+) arthritis, , , -       GI/Hepatic:         Renal/Genitourinary:         Endo:  - neg endo ROS       Psychiatric:  - neg psychiatric ROS       Infectious Disease:  - neg infectious disease ROS       Malignancy:         Other:                         PHYSICAL EXAM:   Mental Status/Neuro: A/A/O   Airway: Facies: Feasible  Mallampati: I  Mouth/Opening: Full  TM distance: > 6 cm  Neck ROM: Full   Respiratory: Auscultation: CTAB     Resp. Rate: Normal     Resp. Effort: Normal      CV: Rhythm: Regular  Rate: Age  "appropriate  Heart: Normal Sounds   Comments:      Dental: Normal                  Lab Results   Component Value Date    WBC 6.2 11/05/2018    HGB 12.4 (L) 11/05/2018    HCT 38.0 (L) 11/05/2018     11/05/2018     11/05/2018    POTASSIUM 4.4 11/05/2018    CHLORIDE 105 11/05/2018    CO2 28 11/05/2018    BUN 19 11/05/2018    CR 1.23 11/05/2018    GLC 91 11/05/2018    LUZMARIA 8.8 11/05/2018    ALBUMIN 3.3 (L) 11/05/2018    PROTTOTAL 6.7 (L) 11/05/2018    ALT 26 11/05/2018    AST 20 11/05/2018    ALKPHOS 76 11/05/2018    BILITOTAL 0.3 11/05/2018    LIPASE 134 11/05/2018    AMYLASE 85 11/05/2018    TSH 2.61 09/11/2018       Preop Vitals  BP Readings from Last 3 Encounters:   11/05/18 148/75   10/10/18 146/71    Pulse Readings from Last 3 Encounters:   11/05/18 55   10/09/18 68      Resp Readings from Last 3 Encounters:   11/05/18 16   10/10/18 16    SpO2 Readings from Last 3 Encounters:   11/05/18 100%   10/10/18 96%      Temp Readings from Last 1 Encounters:   11/05/18 36.8  C (98.3  F) (Oral)    Ht Readings from Last 1 Encounters:   11/05/18 1.651 m (5' 5\")      Wt Readings from Last 1 Encounters:   11/05/18 63.5 kg (139 lb 15.9 oz)    Estimated body mass index is 23.3 kg/(m^2) as calculated from the following:    Height as of this encounter: 1.651 m (5' 5\").    Weight as of this encounter: 63.5 kg (139 lb 15.9 oz).     LDA:  Peripheral IV 10/09/18 Left Hand (Active)   Number of days:27       Peripheral IV 11/05/18 Right Lower forearm (Active)   Number of days:0       ETT (adult) 7.5 (Active)   Number of days:0            Assessment:   ASA SCORE: 2    NPO Status: > 6 hours since completed Solid [employer]   Documentation: H&P complete; Preop Testing complete; Consents complete   Proceeding: Proceed without further delay  Tobacco Use:  NO Active use of Tobacco/UNKNOWN Tobacco use status     Plan:   Anes. Type:  General   Pre-Induction: Acetaminophen PO   Induction:  IV (Standard)   Airway: Oral ETT "   Access/Monitoring: PIV   Maintenance: Balanced   Emergence: Procedure Site   Logistics: Observation/Admission     Postop Pain/Sedation Strategy:  Standard-Options: Opioids PRN     PONV Management:  Adult Risk Factors:, Non-Smoker, Postop Opioids  Prevention: Ondansetron; Dexamethasone     CONSENT: Direct conversation   Plan and risks discussed with: Patient; Spouse   Blood Products: Consent Deferred (Minimal Blood Loss)                         Lakhwinder Pang MD

## 2018-11-06 LAB
COPATH REPORT: NORMAL
COPATH REPORT: NORMAL

## 2018-11-07 ENCOUNTER — PATIENT OUTREACH (OUTPATIENT)
Dept: GASTROENTEROLOGY | Facility: CLINIC | Age: 83
End: 2018-11-07

## 2018-11-07 DIAGNOSIS — K83.1 BILE DUCT STRICTURE (H): Primary | ICD-10-CM

## 2018-11-07 NOTE — PROGRESS NOTES
Post ERCP (11/5/2018) with Dr. Barraza: Follow-up    Post procedure recommendations: - Awaiting biopsy and brushing results; if positive, we will refer to medical oncology and if negative, we will   need to consider our options as suspicion of   cholangiocarcinoma is very high and tissue required;   possibilities include percutaneous CT guided targeting  and repeat EUS/ERCP for sampling, all the while respecting the risk of tract seeding   - ERCP for stent exchange, possibly to metal pending the clinical data in 10w unless repeat EUS/ERCP needed for sampling which should occur within 4w     Orders placed: ERCP and sent to scheduling.     Message left for him to elva with any questions/concerns.     Clinic contact and scheduling numbers verified for future questions/concerns.     Sharon ROSENBERG RN Coordinator  Dr. Bella, Dr. Barraza & Dr. Agosto  Advanced Endoscopy  [phone]

## 2018-11-08 LAB — COPATH REPORT: NORMAL

## 2018-11-20 ENCOUNTER — PATIENT OUTREACH (OUTPATIENT)
Dept: GASTROENTEROLOGY | Facility: CLINIC | Age: 83
End: 2018-11-20

## 2018-11-20 DIAGNOSIS — K83.1 BILE DUCT STRICTURE (H): Primary | ICD-10-CM

## 2018-11-20 NOTE — PROGRESS NOTES
Order for EUS added for procedure per Dr. Bernal and Dr. Barraza.   Please tell him I reviewed with Dashawn Tamez is willing to sample (and therefore do) the EUS   Lets see if we can get him in on a Tues or preferably Wed afternoon in my room for EUS ERCP, book with me and Dashawn   Thanks   Bravo ROSENBERG, RN Care Coordinator  Dr. Bella, Dr. Barraza & Dr. Agosto   Advanced Endoscopy  [phone]

## 2019-01-21 ENCOUNTER — ANESTHESIA EVENT (OUTPATIENT)
Dept: SURGERY | Facility: CLINIC | Age: 84
End: 2019-01-21
Payer: MEDICARE

## 2019-01-21 ASSESSMENT — LIFESTYLE VARIABLES: TOBACCO_USE: 0

## 2019-01-22 ENCOUNTER — ANESTHESIA (OUTPATIENT)
Dept: SURGERY | Facility: CLINIC | Age: 84
End: 2019-01-22
Payer: MEDICARE

## 2019-01-22 ENCOUNTER — HOSPITAL ENCOUNTER (OUTPATIENT)
Facility: CLINIC | Age: 84
Discharge: HOME | End: 2019-01-22
Attending: INTERNAL MEDICINE | Admitting: INTERNAL MEDICINE
Payer: MEDICARE

## 2019-01-22 ENCOUNTER — APPOINTMENT (OUTPATIENT)
Dept: GENERAL RADIOLOGY | Facility: CLINIC | Age: 84
End: 2019-01-22
Attending: INTERNAL MEDICINE
Payer: MEDICARE

## 2019-01-22 VITALS
HEIGHT: 66 IN | WEIGHT: 134.92 LBS | RESPIRATION RATE: 16 BRPM | HEART RATE: 64 BPM | BODY MASS INDEX: 21.68 KG/M2 | SYSTOLIC BLOOD PRESSURE: 147 MMHG | DIASTOLIC BLOOD PRESSURE: 76 MMHG | OXYGEN SATURATION: 95 % | TEMPERATURE: 97.9 F

## 2019-01-22 DIAGNOSIS — K83.1 BILE DUCT STRICTURE (H): Primary | ICD-10-CM

## 2019-01-22 LAB
ALBUMIN SERPL-MCNC: 3.7 G/DL (ref 3.4–5)
ALP SERPL-CCNC: 133 U/L (ref 40–150)
ALT SERPL W P-5'-P-CCNC: 41 U/L (ref 0–70)
AMYLASE SERPL-CCNC: 71 U/L (ref 30–110)
ANION GAP SERPL CALCULATED.3IONS-SCNC: 9 MMOL/L (ref 3–14)
AST SERPL W P-5'-P-CCNC: 27 U/L (ref 0–45)
BILIRUB SERPL-MCNC: 0.6 MG/DL (ref 0.2–1.3)
BUN SERPL-MCNC: 18 MG/DL (ref 7–30)
CALCIUM SERPL-MCNC: 9 MG/DL (ref 8.5–10.1)
CHLORIDE SERPL-SCNC: 105 MMOL/L (ref 94–109)
CO2 SERPL-SCNC: 26 MMOL/L (ref 20–32)
CREAT SERPL-MCNC: 1.22 MG/DL (ref 0.66–1.25)
ERYTHROCYTE [DISTWIDTH] IN BLOOD BY AUTOMATED COUNT: 13.5 % (ref 10–15)
GFR SERPL CREATININE-BSD FRML MDRD: 55 ML/MIN/{1.73_M2}
GLUCOSE BLDC GLUCOMTR-MCNC: 87 MG/DL (ref 70–99)
GLUCOSE SERPL-MCNC: 94 MG/DL (ref 70–99)
HCT VFR BLD AUTO: 42.5 % (ref 40–53)
HGB BLD-MCNC: 14.2 G/DL (ref 13.3–17.7)
LIPASE SERPL-CCNC: 113 U/L (ref 73–393)
MCH RBC QN AUTO: 32.9 PG (ref 26.5–33)
MCHC RBC AUTO-ENTMCNC: 33.4 G/DL (ref 31.5–36.5)
MCV RBC AUTO: 98 FL (ref 78–100)
PLATELET # BLD AUTO: 214 10E9/L (ref 150–450)
POTASSIUM SERPL-SCNC: 4.1 MMOL/L (ref 3.4–5.3)
PROT SERPL-MCNC: 7.7 G/DL (ref 6.8–8.8)
RBC # BLD AUTO: 4.32 10E12/L (ref 4.4–5.9)
SODIUM SERPL-SCNC: 141 MMOL/L (ref 133–144)
WBC # BLD AUTO: 6.8 10E9/L (ref 4–11)

## 2019-01-22 PROCEDURE — 40000170 ZZH STATISTIC PRE-PROCEDURE ASSESSMENT II: Performed by: INTERNAL MEDICINE

## 2019-01-22 PROCEDURE — C1769 GUIDE WIRE: HCPCS | Performed by: INTERNAL MEDICINE

## 2019-01-22 PROCEDURE — 00000155 ZZHCL STATISTIC H-CELL BLOCK W/STAIN: Performed by: INTERNAL MEDICINE

## 2019-01-22 PROCEDURE — 71000027 ZZH RECOVERY PHASE 2 EACH 15 MINS: Performed by: INTERNAL MEDICINE

## 2019-01-22 PROCEDURE — A9270 NON-COVERED ITEM OR SERVICE: HCPCS | Performed by: INTERNAL MEDICINE

## 2019-01-22 PROCEDURE — 25000125 ZZHC RX 250: Performed by: INTERNAL MEDICINE

## 2019-01-22 PROCEDURE — 37000008 ZZH ANESTHESIA TECHNICAL FEE, 1ST 30 MIN: Performed by: INTERNAL MEDICINE

## 2019-01-22 PROCEDURE — 25000128 H RX IP 250 OP 636: Performed by: ANESTHESIOLOGY

## 2019-01-22 PROCEDURE — 88172 CYTP DX EVAL FNA 1ST EA SITE: CPT | Performed by: INTERNAL MEDICINE

## 2019-01-22 PROCEDURE — 80053 COMPREHEN METABOLIC PANEL: CPT | Performed by: INTERNAL MEDICINE

## 2019-01-22 PROCEDURE — 25000128 H RX IP 250 OP 636

## 2019-01-22 PROCEDURE — C1726 CATH, BAL DIL, NON-VASCULAR: HCPCS | Performed by: INTERNAL MEDICINE

## 2019-01-22 PROCEDURE — C1876 STENT, NON-COA/NON-COV W/DEL: HCPCS | Performed by: INTERNAL MEDICINE

## 2019-01-22 PROCEDURE — 71000014 ZZH RECOVERY PHASE 1 LEVEL 2 FIRST HR: Performed by: INTERNAL MEDICINE

## 2019-01-22 PROCEDURE — 36415 COLL VENOUS BLD VENIPUNCTURE: CPT | Performed by: INTERNAL MEDICINE

## 2019-01-22 PROCEDURE — 25000125 ZZHC RX 250: Performed by: NURSE ANESTHETIST, CERTIFIED REGISTERED

## 2019-01-22 PROCEDURE — 25000128 H RX IP 250 OP 636: Performed by: NURSE ANESTHETIST, CERTIFIED REGISTERED

## 2019-01-22 PROCEDURE — 82962 GLUCOSE BLOOD TEST: CPT

## 2019-01-22 PROCEDURE — 25500064 ZZH RX 255 OP 636: Performed by: INTERNAL MEDICINE

## 2019-01-22 PROCEDURE — 37000009 ZZH ANESTHESIA TECHNICAL FEE, EACH ADDTL 15 MIN: Performed by: INTERNAL MEDICINE

## 2019-01-22 PROCEDURE — 88305 TISSUE EXAM BY PATHOLOGIST: CPT | Performed by: INTERNAL MEDICINE

## 2019-01-22 PROCEDURE — 83690 ASSAY OF LIPASE: CPT | Performed by: INTERNAL MEDICINE

## 2019-01-22 PROCEDURE — 40000279 XR SURGERY CARM FLUORO GREATER THAN 5 MIN W STILLS: Mod: TC

## 2019-01-22 PROCEDURE — 36000064 ZZH SURGERY LEVEL 4 EA 15 ADDTL MIN - UMMC: Performed by: INTERNAL MEDICINE

## 2019-01-22 PROCEDURE — 25000566 ZZH SEVOFLURANE, EA 15 MIN: Performed by: INTERNAL MEDICINE

## 2019-01-22 PROCEDURE — 36000066 ZZH SURGERY LEVEL 4 W FLUORO 1ST 30 MIN - UMMC: Performed by: INTERNAL MEDICINE

## 2019-01-22 PROCEDURE — 85027 COMPLETE CBC AUTOMATED: CPT | Performed by: INTERNAL MEDICINE

## 2019-01-22 PROCEDURE — 82150 ASSAY OF AMYLASE: CPT | Performed by: INTERNAL MEDICINE

## 2019-01-22 PROCEDURE — 88173 CYTOPATH EVAL FNA REPORT: CPT | Performed by: INTERNAL MEDICINE

## 2019-01-22 PROCEDURE — 27210794 ZZH OR GENERAL SUPPLY STERILE: Performed by: INTERNAL MEDICINE

## 2019-01-22 PROCEDURE — 25000125 ZZHC RX 250

## 2019-01-22 PROCEDURE — C1877 STENT, NON-COAT/COV W/O DEL: HCPCS | Performed by: INTERNAL MEDICINE

## 2019-01-22 DEVICE — STENT JOHLIN PANCREA WEDGE 10FRX22CM W/INTRO G26828
Type: IMPLANTABLE DEVICE | Site: BILE DUCT | Status: NON-FUNCTIONAL
Removed: 2019-06-07

## 2019-01-22 DEVICE — IMPLANTABLE DEVICE
Type: IMPLANTABLE DEVICE | Site: BILE DUCT | Status: NON-FUNCTIONAL
Removed: 2019-06-07

## 2019-01-22 RX ORDER — ACETAMINOPHEN 500 MG
500 TABLET ORAL EVERY 6 HOURS PRN
COMMUNITY

## 2019-01-22 RX ORDER — IOPAMIDOL 510 MG/ML
INJECTION, SOLUTION INTRAVASCULAR PRN
Status: DISCONTINUED | OUTPATIENT
Start: 2019-01-22 | End: 2019-01-22 | Stop reason: HOSPADM

## 2019-01-22 RX ORDER — CIPROFLOXACIN 2 MG/ML
INJECTION, SOLUTION INTRAVENOUS PRN
Status: DISCONTINUED | OUTPATIENT
Start: 2019-01-22 | End: 2019-01-22

## 2019-01-22 RX ORDER — LIDOCAINE 40 MG/G
CREAM TOPICAL
Status: DISCONTINUED | OUTPATIENT
Start: 2019-01-22 | End: 2019-01-22 | Stop reason: HOSPADM

## 2019-01-22 RX ORDER — EPHEDRINE SULFATE 50 MG/ML
INJECTION, SOLUTION INTRAMUSCULAR; INTRAVENOUS; SUBCUTANEOUS PRN
Status: DISCONTINUED | OUTPATIENT
Start: 2019-01-22 | End: 2019-01-22

## 2019-01-22 RX ORDER — NALOXONE HYDROCHLORIDE 0.4 MG/ML
.1-.4 INJECTION, SOLUTION INTRAMUSCULAR; INTRAVENOUS; SUBCUTANEOUS
Status: DISCONTINUED | OUTPATIENT
Start: 2019-01-22 | End: 2019-01-22 | Stop reason: HOSPADM

## 2019-01-22 RX ORDER — CIPROFLOXACIN 500 MG/1
500 TABLET, FILM COATED ORAL 2 TIMES DAILY
Qty: 10 TABLET | Refills: 0 | Status: ON HOLD | OUTPATIENT
Start: 2019-01-22 | End: 2019-06-07

## 2019-01-22 RX ORDER — GLYCOPYRROLATE 0.2 MG/ML
INJECTION, SOLUTION INTRAMUSCULAR; INTRAVENOUS PRN
Status: DISCONTINUED | OUTPATIENT
Start: 2019-01-22 | End: 2019-01-22

## 2019-01-22 RX ORDER — ONDANSETRON 2 MG/ML
4 INJECTION INTRAMUSCULAR; INTRAVENOUS EVERY 30 MIN PRN
Status: DISCONTINUED | OUTPATIENT
Start: 2019-01-22 | End: 2019-01-22 | Stop reason: HOSPADM

## 2019-01-22 RX ORDER — LIDOCAINE HYDROCHLORIDE 20 MG/ML
INJECTION, SOLUTION INFILTRATION; PERINEURAL PRN
Status: DISCONTINUED | OUTPATIENT
Start: 2019-01-22 | End: 2019-01-22

## 2019-01-22 RX ORDER — HYDROMORPHONE HYDROCHLORIDE 1 MG/ML
.3-.5 INJECTION, SOLUTION INTRAMUSCULAR; INTRAVENOUS; SUBCUTANEOUS EVERY 10 MIN PRN
Status: DISCONTINUED | OUTPATIENT
Start: 2019-01-22 | End: 2019-01-22 | Stop reason: HOSPADM

## 2019-01-22 RX ORDER — FENTANYL CITRATE 50 UG/ML
25-50 INJECTION, SOLUTION INTRAMUSCULAR; INTRAVENOUS
Status: DISCONTINUED | OUTPATIENT
Start: 2019-01-22 | End: 2019-01-22 | Stop reason: HOSPADM

## 2019-01-22 RX ORDER — PROPOFOL 10 MG/ML
INJECTION, EMULSION INTRAVENOUS PRN
Status: DISCONTINUED | OUTPATIENT
Start: 2019-01-22 | End: 2019-01-22

## 2019-01-22 RX ORDER — INDOMETHACIN 50 MG/1
100 SUPPOSITORY RECTAL
Status: DISCONTINUED | OUTPATIENT
Start: 2019-01-22 | End: 2019-01-22 | Stop reason: HOSPADM

## 2019-01-22 RX ORDER — FENTANYL CITRATE 50 UG/ML
INJECTION, SOLUTION INTRAMUSCULAR; INTRAVENOUS PRN
Status: DISCONTINUED | OUTPATIENT
Start: 2019-01-22 | End: 2019-01-22

## 2019-01-22 RX ORDER — ONDANSETRON 2 MG/ML
INJECTION INTRAMUSCULAR; INTRAVENOUS PRN
Status: DISCONTINUED | OUTPATIENT
Start: 2019-01-22 | End: 2019-01-22

## 2019-01-22 RX ORDER — ONDANSETRON 4 MG/1
4 TABLET, ORALLY DISINTEGRATING ORAL EVERY 30 MIN PRN
Status: DISCONTINUED | OUTPATIENT
Start: 2019-01-22 | End: 2019-01-22 | Stop reason: HOSPADM

## 2019-01-22 RX ORDER — SODIUM CHLORIDE, SODIUM LACTATE, POTASSIUM CHLORIDE, CALCIUM CHLORIDE 600; 310; 30; 20 MG/100ML; MG/100ML; MG/100ML; MG/100ML
INJECTION, SOLUTION INTRAVENOUS CONTINUOUS
Status: DISCONTINUED | OUTPATIENT
Start: 2019-01-22 | End: 2019-01-22 | Stop reason: HOSPADM

## 2019-01-22 RX ORDER — DEXAMETHASONE SODIUM PHOSPHATE 4 MG/ML
INJECTION, SOLUTION INTRA-ARTICULAR; INTRALESIONAL; INTRAMUSCULAR; INTRAVENOUS; SOFT TISSUE PRN
Status: DISCONTINUED | OUTPATIENT
Start: 2019-01-22 | End: 2019-01-22

## 2019-01-22 RX ORDER — FLUMAZENIL 0.1 MG/ML
0.2 INJECTION, SOLUTION INTRAVENOUS
Status: DISCONTINUED | OUTPATIENT
Start: 2019-01-22 | End: 2019-01-22 | Stop reason: HOSPADM

## 2019-01-22 RX ORDER — LABETALOL HYDROCHLORIDE 5 MG/ML
10 INJECTION, SOLUTION INTRAVENOUS
Status: DISCONTINUED | OUTPATIENT
Start: 2019-01-22 | End: 2019-01-22 | Stop reason: HOSPADM

## 2019-01-22 RX ORDER — MEPERIDINE HYDROCHLORIDE 50 MG/ML
12.5 INJECTION INTRAMUSCULAR; INTRAVENOUS; SUBCUTANEOUS
Status: DISCONTINUED | OUTPATIENT
Start: 2019-01-22 | End: 2019-01-22 | Stop reason: HOSPADM

## 2019-01-22 RX ADMIN — PROPOFOL 20 MG: 10 INJECTION, EMULSION INTRAVENOUS at 12:54

## 2019-01-22 RX ADMIN — Medication 5 MG: at 13:19

## 2019-01-22 RX ADMIN — FENTANYL CITRATE 50 MCG: 50 INJECTION, SOLUTION INTRAMUSCULAR; INTRAVENOUS at 13:46

## 2019-01-22 RX ADMIN — SUGAMMADEX 120 MG: 100 INJECTION, SOLUTION INTRAVENOUS at 14:39

## 2019-01-22 RX ADMIN — PHENYLEPHRINE HYDROCHLORIDE 50 MCG: 10 INJECTION, SOLUTION INTRAMUSCULAR; INTRAVENOUS; SUBCUTANEOUS at 14:09

## 2019-01-22 RX ADMIN — SODIUM CHLORIDE, POTASSIUM CHLORIDE, SODIUM LACTATE AND CALCIUM CHLORIDE: 600; 310; 30; 20 INJECTION, SOLUTION INTRAVENOUS at 12:23

## 2019-01-22 RX ADMIN — CIPROFLOXACIN 400 MG: 2 INJECTION INTRAVENOUS at 12:40

## 2019-01-22 RX ADMIN — ROCURONIUM BROMIDE 40 MG: 10 INJECTION INTRAVENOUS at 12:29

## 2019-01-22 RX ADMIN — Medication 10 MG: at 12:46

## 2019-01-22 RX ADMIN — ROCURONIUM BROMIDE 10 MG: 10 INJECTION INTRAVENOUS at 14:23

## 2019-01-22 RX ADMIN — FENTANYL CITRATE 50 MCG: 50 INJECTION, SOLUTION INTRAMUSCULAR; INTRAVENOUS at 12:29

## 2019-01-22 RX ADMIN — GLYCOPYRROLATE 0.2 MG: 0.2 INJECTION, SOLUTION INTRAMUSCULAR; INTRAVENOUS at 13:33

## 2019-01-22 RX ADMIN — LIDOCAINE HYDROCHLORIDE 80 MG: 20 INJECTION, SOLUTION INFILTRATION; PERINEURAL at 12:29

## 2019-01-22 RX ADMIN — DEXAMETHASONE SODIUM PHOSPHATE 4 MG: 4 INJECTION, SOLUTION INTRA-ARTICULAR; INTRALESIONAL; INTRAMUSCULAR; INTRAVENOUS; SOFT TISSUE at 12:29

## 2019-01-22 RX ADMIN — Medication 5 MG: at 14:15

## 2019-01-22 RX ADMIN — ONDANSETRON 4 MG: 2 INJECTION INTRAMUSCULAR; INTRAVENOUS at 13:54

## 2019-01-22 RX ADMIN — PROPOFOL 100 MG: 10 INJECTION, EMULSION INTRAVENOUS at 12:29

## 2019-01-22 ASSESSMENT — MIFFLIN-ST. JEOR: SCORE: 1254.75

## 2019-01-22 NOTE — LETTER
Patient:  Jaime Grimaldo  :   1935  MRN:     [id number]        Mr.Lawrence XAVIER Grimaldo  [address] [facility] [address]        2019    Dear ,    We are writing to inform you of your test results. In short, while this sampling did not reveal cholangiocarcinoma (cancer of the bile duct), Dr Neff's biopsy did so. I know he reached out and discussed this with you already and the plan is to seek an oncologist locally in [place] for the time being. We will have you return for stent exchange on your return.    I have included the formal documtentation of the results below. It continues to be a pleasure participating in your care.  Please feel free to contact our clinic with any further questions.      Sincerely,    Bravo Barraza MD PhD RONA OLIVEIRA  Director of Endoscopy  Associate Professor of Medicine, Surgery and Pediatrics  Interventional and Therapeutic Endoscopy    [facility]  Division of Gastroenterology and Hepatology  [facility] [address] [address]    New Consultations  [phone]  Procedure Scheduling [phone]  Clinical Nurse Coordinator [phone]  Clinical Fax   [phone]  Administrative   [phone]  Administrative Fax  [phone]    Resulted Orders   Fine needle aspiration   Result Value Ref Range    Copath Report       Patient Name: JAIME GRIMALDO  MR#: [id number]  Specimen #: [id number]  Collected: 2019  Received: 2019  Reported: 2019 14:41  Ordering Phy(s): BENNY NEFF    For improved result formatting, select 'View Enhanced Report Format' under   Linked Documents section.    SPECIMEN/STAIN PROCESS:  Gastrointestinal, bile duct, endoscopic ultrasound guided fine needle   aspiration       Pap-Cyto x 3, Diff Quick Stain-cyto x 3, Cell Block w/ H&E-Cyto x 1,   Save Ribbon, 1 x 1, Cell Block,  Level 2 x 1, Save Ribbon, 2 x 1, Cell Block, Level 3 x  1    ----------------------------------------------------------------    CYTOLOGIC INTERPRETATION:     Bile duct, endoscopic ultrasound guided fine needle aspiration:   -Positive for malignancy.   -Adenocarcinoma  Specimen Adequacy: Satisfactory for evaluation.    COMMENT:  This case has also been reviewed by Dr. Desir and Dr. Rainey who agree   with the diagnosis.    I have personally reviewed all specimens and/or slides, including t he   listed special stains, and used them  with my medical judgement to determine or confirm the final diagnosis.    Electronically signed out by:  Jenniffer Mensah M.D., [facility]    Processed and screened at [facility]    CLINICAL HISTORY:  Patient is 83 year old male with history of mass (1-1.5cm) in bile duct.    ,    GROSS:    Gastrointestinal, bile duct, endoscopic ultrasound guided fine needle   aspiration:  Received are 3 fixed  slides, processed for Pap stain, 3 air dried slides, processed for Diff   Quik stain, and material in formalin,  processed for one hematoxylin stained cell block.    INTRAOPERATIVE CONSULTATION:    FNA Performance: Fine needle aspiration was not performed by [facility],    Pathology staff.    Immediate Adequacy: On site specimen adequacy evaluation was performed by   Dr. GARTH Mensah MD via telepathology.    Onsite adequacy/interpretation:  Pass A1- A3: Adequate, Pass A4 and A5: put into formalin.     MICROSCOPIC:  Microscopic examination is performed.  Shelley Pereira MD, Cytopathology Fellow      Jenniffer Mensah MD    CPT Codes:   03191-HCDG-MFY, 90291-ZSFN-PYH  A: 20618-RRVA, 76764-CLB, HCB    TESTING LAB LOCATION:  [facility], [address]   [address] [phone]    COLLECTION SITE:  Client:  [facility]  Location:  [facility] ([facility])    Resident  [id number]     Surgical pathology exam  "  Result Value Ref Range    Copath Report       Patient Name: JAIME GRIMALDO  MR#: [id number]  Specimen #: [id number]  Collected: 1/22/2019  Received: 1/22/2019  Reported: 1/23/2019 11:27  Ordering Phy(s): ROGELIO MOTLEY  Additional Phy(s): BENNY NEFF    For improved result formatting, select 'View Enhanced Report Format' under   Linked Documents section.    SPECIMEN(S):  Bifurcation stricture    FINAL DIAGNOSIS:  BIFURCATION STRICTURE, BIOPSY:  - Biliary epithelium with focal mild atypia and associated mixed   inflammation  - Negative for high-grade dysplasia and invasive malignancy    COMMENT:  Dr. Desir has reviewed the case and concurs.  Please correlate with   corresponding cytology specimen [id number].    I have personally reviewed all specimens and/or slides, including the   listed special stains, and used them  with my medical judgement to determine or confirm the final diagnosis.    Electronically signed out by:    Cruzito Ferguson M.D., Physicians    CLINICAL HISTORY:  82yo gentleman with imaging suggestiv eof cholangiocarcinoma who returns   for continued samplingand stent  exchange by ERCP  GROSS:  A: The specimen is received in formalin with proper patient   identification, labeled \"bifurcation stricture\".  The specimen consists of four segments of pink-red soft tissue ranging   from <0.1-0.1 cm in greatest dimension.   It is wrapped entirely submitted in cassette A1. (Dictated by: Carlita MCCANN [facility] 1/22/2019 02:37 PM)    MICROSCOPIC:  Microscopic examination was performed.    CPT Codes:  A: 14262-DS3    TESTING LAB LOCATION:  [facility], [address]   [address] [phone]    COLLECTION SITE:  Client[facility]  Location: [facility] ([facility])    Resident  [id number]               "

## 2019-01-22 NOTE — PROGRESS NOTES
SPIRITUAL HEALTH SERVICES  [facility] 3C   PRE-SURGERY VISIT    Had pre-surgery visit with patient, Tim Cazares, his wife Brea, and two daughters.  Provided spiritual support, prayer.     Hector Burgess  Chaplain Resident  Pager [phone]

## 2019-01-22 NOTE — H&P
ENDOSCOPY PRE-SEDATION H&P FOR OUTPATIENT PROCEDURES    Tim Cazares  [id number]  1935    Procedure: Endoscopic ultrasound with possible biopsies                       Endoscopic retrograde cholangiopancreatography    Pre-procedure diagnosis: Liver mass    Past medical history:   Past Medical History:   Diagnosis Date     CKD (chronic kidney disease)      GERD (gastroesophageal reflux disease)      Hypertension        Past surgical history:   Past Surgical History:   Procedure Laterality Date     CHOLECYSTECTOMY       ENDOSCOPIC RETROGRADE CHOLANGIOPANCREATOGRAM N/A 10/9/2018    Procedure: COMBINED ENDOSCOPIC RETROGRADE CHOLANGIOPANCREATOGRAPHY, PLACE TUBE/STENT;;  Surgeon: Bravo Barraza MD;  [facility]     ENDOSCOPIC RETROGRADE CHOLANGIOPANCREATOGRAM WITH SPYGLASS N/A 11/5/2018    Procedure: Endoscopic retrograde cholangiopancreatography with spyglass cholangioscopy, biliary biopsies, brushings, and stent exchange;  Surgeon: Bravo Barraza MD;  [facility] OR     ENDOSCOPIC ULTRASOUND UPPER GASTROINTESTINAL TRACT (GI) N/A 10/9/2018    Procedure: ENDOSCOPIC ULTRASOUND, ESOPHAGOSCOPY / UPPER GASTROINTESTINAL TRACT (GI);   Endoscopic Retrograde Cholangiopancreatogram, pancreatic stent placement, biliary sphincterotomy, cholangioscopy, brushings and biopsies, biliary stent placement;  Surgeon: Bravo Barraza MD;  Location[facility]     ESOPHAGOSCOPY, GASTROSCOPY, DUODENOSCOPY (EGD), COMBINED N/A 11/5/2018    Procedure: Upper Endoscopic Ultrasound with fine needle biopsy, ;  Surgeon: Bravo Barraza MD;  Location: [facility] OR     HERNIA REPAIR       PROSTATE SURGERY         Current Facility-Administered Medications   Medication     indomethacin (INDOCIN) 50 MG Suppository 100 mg     lactated ringers infusion     lidocaine (LMX4) cream     lidocaine (LMX4) cream     lidocaine 1 % 1 mL     lidocaine 1 % 1 mL     sodium chloride (PF) 0.9% PF flush 3 mL     sodium chloride (PF) 0.9% PF  flush 3 mL     sodium chloride (PF) 0.9% PF flush 3 mL     sodium chloride (PF) 0.9% PF flush 3 mL     sodium chloride (PF) 0.9% PF flush 3 mL       Allergies   Allergen Reactions     Hydralazine Other (See Comments)     Irregular heart rate  Tingling in hands and feet  Sore throat     Penicillin G      Strawberries [Strawberry]      Sulfa Drugs      Isosorbide Other (See Comments)       History of Anesthesia/Sedation Problems: yes    Physical Exam:    ROS: 10 point ROS was performed. Pertinent findings as below.     Mental status: alert  Heart: Normal  Lung: Normal  Assessment of patient's airway: Normal  Other as pertinent for procedure: None     ASA Score: See Provation note    Mallampati score:  II - Faucial pillars and soft palate may be seen, but uvula is masked by the base of the tongue    Assessment/Plan:   83 yo gentleman with imaging suggestive  of cholangiocarcinoma who returns for continued sampling by EUS and stent exchange by ERCP. His last EUS/ERCP 11/5/18 with spybite biopsies and  Brushings were negative for malignancy. Repeat EUS with possible sampling of the lesion next to the bifurcation and ERCP for stent exchange.     At the time of exam, he denies any n/v, diarrhea or abdominal pain. Denies any ongoing weight loss in the last 2 months.     The patient is an appropriate candidate to receive sedation.    Informed consent was discussed with the patient/family, including the risks, benefits, potential complications and any alternative options associated with sedation.    Patient assessment completed just prior to sedation and while under constant observation by the provider. Condition determined to be adequate for proceeding with sedation.    The specific risks for the procedure were discussed with the patient at the time of informed consent and include but are not limited to perforation which could require surgery, missing significant neoplasm or lesion, hemorrhage and adverse sedative  complication.      Dane Guzman MD

## 2019-01-22 NOTE — OR NURSING
Paged Dr. Bernal to inform that pt needs a review of systems. His H & P in Care Everywhere is not complete.

## 2019-01-22 NOTE — DISCHARGE INSTRUCTIONS
Dr Bernal to see the patient prior to discharge from . Please page him when he arrives on 3 C.     [facility], [place]  Same-Day Surgery   Adult Discharge Orders & Instructions     For 24 hours after surgery    1. Get plenty of rest.  A responsible adult must stay with you for at least 24 hours after you leave the hospital.   2. Do not drive or use heavy equipment.  If you have weakness or tingling, don't drive or use heavy equipment until this feeling goes away.  3. Do not drink alcohol.  4. Avoid strenuous or risky activities.  Ask for help when climbing stairs.   5. You may feel lightheaded.  IF so, sit for a few minutes before standing.  Have someone help you get up.   6. If you have nausea (feel sick to your stomach): Drink only clear liquids such as apple juice, ginger ale, broth or 7-Up.  Rest may also help.  Be sure to drink enough fluids.  Move to a regular diet as you feel able.  7. You may have a slight fever. Call the doctor if your fever is over 100 F (37.7 C) (taken under the tongue) or lasts longer than 24 hours.  8. You may have a dry mouth, a sore throat, muscle aches or trouble sleeping.  These should go away after 24 hours.  9. Do not make important or legal decisions.   Call your doctor for any of the followin.  Signs of infection (fever, growing tenderness at the surgery site, a large amount of drainage or bleeding, severe pain, foul-smelling drainage, redness, swelling).    2. It has been over 8 to 10 hours since surgery and you are still not able to urinate (pass water).      To contact a doctor, call ___Dr Bernal's office at [phone]_ or:        [phone] and ask for the resident on call for Surgery (answered 24 hours a day)      Emergency Department:    [facility]: [phone]       (TTY for hearing impaired: [phone])

## 2019-01-22 NOTE — LETTER
Patient:  Jaime Grimaldo  :   1935  MRN:     [id number]        Mr.Lawrence XAVIER Grimaldo  [address] [facility] [address]        2019    Dear ,    We are writing to inform you of your test results. These are the biopsy results we discussed today by telephone. I am forwarding them to you for your records.    Please feel free to call if you have any questions about this letter. I can be reached at [phone].    GAETANO Neff MD  Associate Professor of Medicine  Division of Gastroenterology, Hepatology and Nutrition  [facility]      Resulted Orders   Fine needle aspiration   Result Value Ref Range    Copath Report       Patient Name: JAIME GRIMALDO  MR#: [id number]  Specimen #: [id number]  Collected: 2019  Received: 2019  Reported: 2019 14:41  Ordering Phy(s): BENNY NEFF    For improved result formatting, select 'View Enhanced Report Format' under   Linked Documents section.    SPECIMEN/STAIN PROCESS:  Gastrointestinal, bile duct, endoscopic ultrasound guided fine needle   aspiration       Pap-Cyto x 3, Diff Quick Stain-cyto x 3, Cell Block w/ H&E-Cyto x 1,   Save Ribbon, 1 x 1, Cell Block,  Level 2 x 1, Save Ribbon, 2 x 1, Cell Block, Level 3 x 1    ----------------------------------------------------------------    CYTOLOGIC INTERPRETATION:     Bile duct, endoscopic ultrasound guided fine needle aspiration:   -Positive for malignancy.   -Adenocarcinoma  Specimen Adequacy: Satisfactory for evaluation.    COMMENT:  This case has also been reviewed by Dr. Desir and Dr. Rainey who agree   with the diagnosis.    I have personally reviewed all specimens and/or slides, including t he   listed special stains, and used them  with my medical judgement to determine or confirm the final diagnosis.    Electronically signed out by:  Jenniffer Mensah M.D., [facility]    Processed and screened at [facility],    [facility]    CLINICAL HISTORY:  Patient is 83 year old male with history of mass (1-1.5cm) in bile duct.    ,    GROSS:    Gastrointestinal, bile duct, endoscopic ultrasound guided fine needle   aspiration:  Received are 3 fixed  slides, processed for Pap stain, 3 air dried slides, processed for Diff   Quik stain, and material in formalin,  processed for one hematoxylin stained cell block.    INTRAOPERATIVE CONSULTATION:    FNA Performance: Fine needle aspiration was not performed by [facility],    Pathology staff.    Immediate Adequacy: On site specimen adequacy evaluation was performed by   Dr. GARTH Mensah MD via telepathology.    Onsite adequacy/interpretation:  Pass A1- A3: Adequate, Pass A4 and A5: put into formalin.     MICROSCOPIC:  Microscopic examination is performed.  Shelley Pereira MD, Cytopathology Fellow      Jenniffer Mensah MD    CPT Codes:   23094-BVRU-SMC, 19851-APEF-PIO  A: 62378-UOLS, 62733-PAK, HCB    TESTING LAB LOCATION:  [facility], [address]   [id number] [phone]    COLLECTION SITE:  Client:  [facility]  Location:  [facility] ([facility])    Resident  [id number]     Surgical pathology exam   Result Value Ref Range    Copath Report       Patient Name: JAIME GRIMALDO  MR#: [id number]  Specimen #: [id number]  Collected: 1/22/2019  Received: 1/22/2019  Reported: 1/23/2019 11:27  Ordering Phy(s): ROGELIO MOTLEY  Additional Phy(s): BENNY NEFF    For improved result formatting, select 'View Enhanced Report Format' under   Linked Documents section.    SPECIMEN(S):  Bifurcation stricture    FINAL DIAGNOSIS:  BIFURCATION STRICTURE, BIOPSY:  - Biliary epithelium with focal mild atypia and associated mixed   inflammation  - Negative for high-grade dysplasia and invasive malignancy    COMMENT:  Dr. Desir has reviewed the case and concurs.  Please correlate with  "  corresponding cytology specimen [id number].    I have personally reviewed all specimens and/or slides, including the   listed special stains, and used them  with my medical judgement to determine or confirm the final diagnosis.    Electronically signed out by:    Cruzito Ferguson M.D., [facility]    CLINICAL HISTORY:  82yo gentleman with imaging suggestiv eof cholangiocarcinoma who returns   for continued samplingand stent  exchange by ERCP  GROSS:  A: The specimen is received in formalin with proper patient   identification, labeled \"bifurcation stricture\".  The specimen consists of four segments of pink-red soft tissue ranging   from <0.1-0.1 cm in greatest dimension.   It is wrapped entirely submitted in cassette A1. (Dictated by: Carlita MCCANN [facility] 1/22/2019 02:37 PM)    MICROSCOPIC:  Microscopic examination was performed.    CPT Codes:  A: 53069-PB7    TESTING LAB LOCATION:  [facility], [address]   [address]  [phone]    COLLECTION SITE:  Client: [facility]  Location: [facility] ([facility])    Resident  [id number]               "

## 2019-01-22 NOTE — ANESTHESIA CARE TRANSFER NOTE
Patient: Tim Cazares    Procedure(s):  Upper Endoscopic Ultrasound with fine needle aspiration of bile duct mass  Endoscopic Retrograde Cholangiopancreatogram with spyglass , bile duct stents exghanged, balloon sweep of bile duct for stones    Diagnosis: Bile Duct Stricture   Diagnosis Additional Information: No value filed.    Anesthesia Type:   No value filed.     Note:  Airway :Face Mask  Patient transferred to:PACU  Comments: VSS. Breathing spontaneously at a regular rate with adequate tidal volumes and maintaining O2 sats on RA. Denies nausea or pain. No apparent complications from anesthesia.     Moustapha Barbour MD, MSc.  Anesthesia Resident, CA-1  Handoff Report: Identifed the Patient, Identified the Reponsible Provider, Reviewed the pertinent medical history, Discussed the surgical course, Reviewed Intra-OP anesthesia mangement and issues during anesthesia, Set expectations for post-procedure period and Allowed opportunity for questions and acknowledgement of understanding      Vitals: (Last set prior to Anesthesia Care Transfer)    CRNA VITALS  1/22/2019 1429 - 1/22/2019 1509      1/22/2019             Pulse:  71    Ht Rate:  74    SpO2:  100 %                Electronically Signed By: Moustapha Barbour MD  January 22, 2019  3:09 PM

## 2019-01-22 NOTE — ANESTHESIA POSTPROCEDURE EVALUATION
Anesthesia POST Procedure Evaluation    Patient: Tim Cazares   MRN:     [id number] Gender:   male   Age:    84 year old :      1935        Preoperative Diagnosis: Bile Duct Stricture    Procedure(s):  Upper Endoscopic Ultrasound with fine needle aspiration of bile duct mass  Endoscopic Retrograde Cholangiopancreatogram with spyglass , bile duct stents exghanged, balloon sweep of bile duct for stones   Postop Comments: No value filed.       Anesthesia Type:  General    Reportable Event: NO     PAIN: Uncomplicated   Sign Out status: Comfortable, Well controlled pain     PONV: No PONV   Sign Out status:  No Nausea or Vomiting     Neuro/Psych: Uneventful perioperative course   Sign Out Status: Preoperative baseline; Age appropriate mentation     Airway/Resp.: Uneventful perioperative course   Sign Out Status: Non labored breathing, age appropriate RR; Resp. Status within EXPECTED Parameters     CV: Uneventful perioperative course   Sign Out status: Appropriate BP and perfusion indices; Appropriate HR/Rhythm     Disposition:   Sign Out in:  PACU  Disposition:  Phase II; Home  Recovery Course: Uneventful  Follow-Up: Not required           Last Anesthesia Record Vitals:  CRNA VITALS  2019 1429 - 2019 1523      2019             Pulse:  71    Ht Rate:      SpO2:  100 %          Last PACU/Preop Vitals:  Vitals:    19 1016 19 1500   BP: 160/75 121/61   Pulse: 54 67   Resp: 16 18   Temp: 36.3  C (97.4  F) 36.4  C (97.6  F)   SpO2: 98% 98%         Electronically Signed By: Rodolfo Narvaez MD, 2019, 3:23 PM

## 2019-01-22 NOTE — ANESTHESIA PREPROCEDURE EVALUATION
Anesthesia Pre-Procedure Evaluation    Patient: Tim Cazares   MRN:     [id number] Gender:   male   Age:    84 year old :      1935        Preoperative Diagnosis: Bile Duct Stricture    Procedure(s):  Upper Endoscopic Ultrasound  Endoscopic Retrograde Cholangiopancreatogram     Past Medical History:   Diagnosis Date     CKD (chronic kidney disease)      GERD (gastroesophageal reflux disease)      Hypertension       Past Surgical History:   Procedure Laterality Date     CHOLECYSTECTOMY       ENDOSCOPIC RETROGRADE CHOLANGIOPANCREATOGRAM N/A 10/9/2018    Procedure: COMBINED ENDOSCOPIC RETROGRADE CHOLANGIOPANCREATOGRAPHY, PLACE TUBE/STENT;;  Surgeon: Bravo Barraza MD;  Location: [facility] OR     ENDOSCOPIC RETROGRADE CHOLANGIOPANCREATOGRAM WITH SPYGLASS N/A 2018    Procedure: Endoscopic retrograde cholangiopancreatography with spyglass cholangioscopy, biliary biopsies, brushings, and stent exchange;  Surgeon: Bravo Barraza MD;  Location: [facility] OR     ENDOSCOPIC ULTRASOUND UPPER GASTROINTESTINAL TRACT (GI) N/A 10/9/2018    Procedure: ENDOSCOPIC ULTRASOUND, ESOPHAGOSCOPY / UPPER GASTROINTESTINAL TRACT (GI);   Endoscopic Retrograde Cholangiopancreatogram, pancreatic stent placement, biliary sphincterotomy, cholangioscopy, brushings and biopsies, biliary stent placement;  Surgeon: Bravo Barraza MD;  Location: [facility] OR     ESOPHAGOSCOPY, GASTROSCOPY, DUODENOSCOPY (EGD), COMBINED N/A 2018    Procedure: Upper Endoscopic Ultrasound with fine needle biopsy, ;  Surgeon: Bravo Barraza MD;  Location: [facility] OR     HERNIA REPAIR       PROSTATE SURGERY            Anesthesia Evaluation     . Pt has had prior anesthetic. Type: General    No history of anesthetic complications          ROS/MED HX    ENT/Pulmonary:  - neg pulmonary ROS    (-) tobacco use and asthma   Neurologic:  - neg neurologic ROS     Cardiovascular:     (+) hypertension----. : . . . :. .       METS/Exercise Tolerance:  4 -  "Raking leaves, gardening   Hematologic:  - neg hematologic  ROS       Musculoskeletal:  - neg musculoskeletal ROS       GI/Hepatic:     (+) GERD Asymptomatic on medication, Other GI/Hepatic Extrinsic compression of biliary ducts; concern for cholangiocarcinoma      Renal/Genitourinary:  - ROS Renal section negative       Endo:  - neg endo ROS       Psychiatric:  - neg psychiatric ROS       Infectious Disease:  - neg infectious disease ROS       Malignancy:   (+) Malignancy History of GI  GI CA Active status post,         Other:                         PHYSICAL EXAM:   Mental Status/Neuro: A/A/O   Airway: Facies: Feasible  Mallampati: II  Mouth/Opening: Full  TM distance: > 6 cm  Neck ROM: Full   Respiratory: Auscultation: CTAB     Resp. Rate: Normal     Resp. Effort: Normal      CV: Rhythm: Regular  Rate: Age appropriate  Heart: Normal Sounds   Comments:      Dental: Normal                  Lab Results   Component Value Date    WBC 6.2 11/05/2018    HGB 12.4 (L) 11/05/2018    HCT 38.0 (L) 11/05/2018     11/05/2018     11/05/2018    POTASSIUM 4.4 11/05/2018    CHLORIDE 105 11/05/2018    CO2 28 11/05/2018    BUN 19 11/05/2018    CR 1.23 11/05/2018    GLC 91 11/05/2018    LUZMARIA 8.8 11/05/2018    ALBUMIN 3.3 (L) 11/05/2018    PROTTOTAL 6.7 (L) 11/05/2018    ALT 26 11/05/2018    AST 20 11/05/2018    ALKPHOS 76 11/05/2018    BILITOTAL 0.3 11/05/2018    LIPASE 134 11/05/2018    AMYLASE 85 11/05/2018    TSH 2.61 09/11/2018       Preop Vitals  BP Readings from Last 3 Encounters:   11/05/18 129/68   10/10/18 146/71    Pulse Readings from Last 3 Encounters:   11/05/18 55   10/09/18 68      Resp Readings from Last 3 Encounters:   11/05/18 16   10/10/18 16    SpO2 Readings from Last 3 Encounters:   11/05/18 98%   10/10/18 96%      Temp Readings from Last 1 Encounters:   11/05/18 36.6  C (97.8  F) (Oral)    Ht Readings from Last 1 Encounters:   11/05/18 1.651 m (5' 5\")      Wt Readings from Last 1 Encounters: " "  11/05/18 63.5 kg (139 lb 15.9 oz)    Estimated body mass index is 23.3 kg/m  as calculated from the following:    Height as of 11/5/18: 1.651 m (5' 5\").    Weight as of 11/5/18: 63.5 kg (139 lb 15.9 oz).     LDA:            Assessment:   ASA SCORE: 2    NPO Status: > 2 hours since completed Clear Liquids; > 6 hours since completed Solid Foods   Documentation: H&P complete   Proceeding: Proceed without further delay  Tobacco Use:  NO Active use of Tobacco/UNKNOWN Tobacco use status     Plan:   Anes. Type:  General      Induction:  IV (Standard)   Airway: Oral ETT   Access/Monitoring: PIV   Maintenance: Balanced   Emergence: Procedure Site   Logistics: Same Day Surgery     Postop Pain/Sedation Strategy:  Standard-Options: Opioids PRN     PONV Management:  Adult Risk Factors:, Non-Smoker, Postop Opioids  Prevention: Ondansetron; Dexamethasone     CONSENT: Direct conversation   Plan and risks discussed with: Patient                            Moustapha Barbour MD  "

## 2019-01-23 LAB — COPATH REPORT: NORMAL

## 2019-01-24 ENCOUNTER — CARE COORDINATION (OUTPATIENT)
Dept: GASTROENTEROLOGY | Facility: CLINIC | Age: 84
End: 2019-01-24

## 2019-01-24 DIAGNOSIS — C22.1 CHOLANGIOCARCINOMA (H): Primary | ICD-10-CM

## 2019-01-24 LAB
COPATH REPORT: NORMAL
ERCP: NORMAL
UPPER EUS: NORMAL

## 2019-01-24 NOTE — PROGRESS NOTES
Post ERCP (1/22/19) with Dr. Barraza: Follow-up    Post procedure recommendations: Refer to tandem EUS note, again describing a suspicious bile duct mass which was sampled (results pending); these results and those of the cholangiosocpic biopsies will be communicated by Dr Barraza when  available. If these return confirming cholangiocarcinoma (or other cancer) we will exchange to metal stents at  the time of next ERCP in . If again negative (not anticipiated), we will devise another plan for tissue  acquistion.      Orders placed: ERCP    Both numbers in chart are out of service.     Clinic contact and scheduling numbers verified for future questions/concerns.     MELY Scruggs Dr., Dr. Barraza, & Dr. Agosto  Advanced [facility]  [phone]

## 2019-01-25 ENCOUNTER — TELEPHONE (OUTPATIENT)
Dept: GASTROENTEROLOGY | Facility: CLINIC | Age: 84
End: 2019-01-25

## 2019-01-25 NOTE — TELEPHONE ENCOUNTER
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    Called and informed pt and spouse. They are currently in [place] and will be for another 3 months or so.    Per ERCP note, it appears plan will be to exchange for metal stent in approximately 3 months. Dr. Barraza is currently out of town.    Message forwarded to Dr. Barraza re coordination of stent change and possible medical oncology consultation either here or in [place].    GAETANO Bernal MD  Associate Professor of Medicine  Division of Gastroenterology, Hepatology and Nutrition  [facility]

## 2019-01-30 ENCOUNTER — TELEPHONE (OUTPATIENT)
Dept: GASTROENTEROLOGY | Facility: CLINIC | Age: 84
End: 2019-01-30

## 2019-01-30 NOTE — TELEPHONE ENCOUNTER
[facility] Call Center    Phone Message    May a detailed message be left on voicemail: No    Reason for Call: Other: Patient's wife called and would like to discuss results and future follow up after ERCP and EUS. Stated she thought she would get a call Monday or Tuesday     Action Taken: Message routed to:  [facility] ([facility] Lis Sanon

## 2019-01-31 ENCOUNTER — TELEPHONE (OUTPATIENT)
Dept: GASTROENTEROLOGY | Facility: CLINIC | Age: 84
End: 2019-01-31

## 2019-01-31 NOTE — TELEPHONE ENCOUNTER
Spoke to patient's wife Brea in regards to scheduling procedure for patient. Brea mentioned that they are currently in [place], [place] for the winter and they would prefer to schedule on 4/15/19 or 4/29/19. Informed Brea the patient is scheduled on 4/29/19. Informed Brea that the patient will need an updated pre-op physical within 30 days of the procedure. Brea stated the patient will have this done locally in [place]. Informed Brea on the day of the procedure the patient will need a  and someone to monitor him for 24 hours after the procedure. Confirmed location of the procedure with Brea. Brea requested all scheduling details and pre-op forms be sent to address in [place]. Direct line given in case there were any additional questions.    [address]  [address]  [place] [address]    HK 1/31/19 1108am

## 2019-01-31 NOTE — TELEPHONE ENCOUNTER
Per Dr. Barraza: Thanks Dashawn   Team, Dashawn already communicated the confirmed diagnosis of cancer. I think it would be easier to have them connect with docs in [place] so far as medical oncology, but if they need a referral somewhere we should provide one for cholangiocarcinoma. Perhaps we can call them to schedule the stent change (as per my note) and discuss this at the same time.     Spoke with Brea and she states that her PCP in [place] advised that they know of a place. Brea wasn't sure if he would take care of the referral. Advised her to call me if she needs us to place the referral as we would be happy to help with that. She also stated that they will buy their plane tickets back to [place] depending on when the next procedure will be. Advised her that I will let Judy know as she is the one who schedules. She wanted me to make note that April 15th and 29th are their preferred days. Direct line given to call for any further questions/concerns.     MELY Scruggs Dr., Dr. Barraza, & Dr. Agosto  Advanced [facility]  [phone]

## 2019-02-04 ENCOUNTER — TELEPHONE (OUTPATIENT)
Dept: GASTROENTEROLOGY | Facility: CLINIC | Age: 84
End: 2019-02-04

## 2019-02-04 NOTE — TELEPHONE ENCOUNTER
Brea called to get records sent to PCP in [place]. Advised her to call medical records. Number given to call.     MELY Scruggs Dr., Dr. Barraza, & Dr. Agosto  [facility]  [phone]

## 2019-02-18 ENCOUNTER — CARE COORDINATION (OUTPATIENT)
Dept: GASTROENTEROLOGY | Facility: CLINIC | Age: 84
End: 2019-02-18

## 2019-04-16 ENCOUNTER — TRANSFERRED RECORDS (OUTPATIENT)
Dept: HEALTH INFORMATION MANAGEMENT | Facility: CLINIC | Age: 84
End: 2019-04-16

## 2019-04-16 ENCOUNTER — TELEPHONE (OUTPATIENT)
Dept: GASTROENTEROLOGY | Facility: CLINIC | Age: 84
End: 2019-04-16

## 2019-04-16 NOTE — TELEPHONE ENCOUNTER
Patients wife called and informed me that Dr. Regan from [facility] in [facility] would like patient to push the procedure out a month due to the radiation he is getting and would like him to heal from it.     She provided the contact and fax number for [facility].   Ph: [phone]  Fax: [phone]    Sent fax requesting they send medical records to our clinic.     I will route to Dr. Barraza and Judy our .     MELY Scruggs Dr., Dr. Barraza, & Dr. Agosto  [facility]  [phone]

## 2019-04-25 ENCOUNTER — TELEPHONE (OUTPATIENT)
Dept: GASTROENTEROLOGY | Facility: CLINIC | Age: 84
End: 2019-04-25

## 2019-04-25 NOTE — TELEPHONE ENCOUNTER
Spoke with Dr. Barraza and he would like to keep him on scheduled procedure on 4/29 as long as he is feeling well. He is worried about obstruction and thinks it is the safest thing to do.     Called and spoke with Tim and wife. He informed me that he is not doing well and was told it would take a while to heal after radiation and feel like himself again. He is weak with no energy.     Informed Dr. Barraza how he is feeling and since he is not feeling well he is ok with pushing it out until June 11th and we will call to see how he is doing if there are any cancellations.     Returned call to Tim and wife and advised of the above. He is aware that he will need a pre op physical within 30 days. He is aware of the prep.     He will call me in about a week to let me know how he is feeling.     MELY Scruggs Dr., Dr. Barraza, & Dr. Agosto  Advanced [facility]  [phone]

## 2019-04-25 NOTE — TELEPHONE ENCOUNTER
Alyssia Bruner Haylee             Please push out to June 11th at 10:00am. If there are any cancellations, I will call to see if he is feeling well enough to move up.     He is aware and just needs a letter. Confirmed address.     Thanks,   Alyssia          Patient has been rescheduled to 6/11/19 due to message received above.     4/25/19 214pm

## 2019-05-03 ENCOUNTER — TELEPHONE (OUTPATIENT)
Dept: GASTROENTEROLOGY | Facility: CLINIC | Age: 84
End: 2019-05-03

## 2019-05-03 ENCOUNTER — TRANSFERRED RECORDS (OUTPATIENT)
Dept: HEALTH INFORMATION MANAGEMENT | Facility: CLINIC | Age: 84
End: 2019-05-03

## 2019-05-03 NOTE — TELEPHONE ENCOUNTER
Spoke with wife, Brea and she reports that Tim is having night sweats and has been the last couple nights, he woke up and had chills this morning. He hasn't been eating well for the past 2 days but is staying hydrated. Last night he seemed like he had a temperature but does not have a thermometer. He has no energy.     Denies vomiting and nausea.     Informed that Dr. Barraza is worried about obstruction and that is why he did not want to push out the procedure but we were in a tough spot because he was not feeling well. Now that he is symptomatic he should be evaluated.     Spoke with Valencia Tran, RNCC and agreed that he should be evaluated in ED.     On call GI number was given.     Asked them to call and update me.     MELY Scruggs Dr., Dr. Barraza, & Dr. Agosto  [facility]  [phone]

## 2019-05-06 NOTE — TELEPHONE ENCOUNTER
"Patient followed up in ER as directed related to symptoms. CT done, no obstruction noted on read. Prescribed levofloxacin. Wife reporting patient not tolerating antibiotic well, he's not steady on his feet and forgetful. Asked if he can take 1/2 the dose as he's \"a small man\". Instructed her to follow up with PCP or oncology regarding antibiotic and that changing prescribed dosing is not recommended, see if they'll consider a different antibiotic.     Pt scheduled to see PCP for pre op physical on 5/13 and his oncologist later this month. Procedure with Dr. Barraza on 6/11.     They will keep us updated on symptoms and patient condition.     Valencia Tran, RN Care Coordinator    "

## 2019-05-13 ENCOUNTER — TRANSFERRED RECORDS (OUTPATIENT)
Dept: HEALTH INFORMATION MANAGEMENT | Facility: CLINIC | Age: 84
End: 2019-05-13

## 2019-05-20 ENCOUNTER — CARE COORDINATION (OUTPATIENT)
Dept: GASTROENTEROLOGY | Facility: CLINIC | Age: 84
End: 2019-05-20

## 2019-05-20 NOTE — PROGRESS NOTES
Received and scanned pre op into Epic.     MELY Scruggs Dr., Dr. Barraza, & Dr. Agosto  [facility]  [phone]

## 2019-06-03 ENCOUNTER — TELEPHONE (OUTPATIENT)
Dept: GASTROENTEROLOGY | Facility: CLINIC | Age: 84
End: 2019-06-03

## 2019-06-03 NOTE — TELEPHONE ENCOUNTER
"Brea (wife) left voicemail advising that Tim is not feeling well and is haing imaging done.     Returned call and she reports he is running fevers sporadically, cramping in the stomach and uncomfortable. She said he stated \"he feels like he is blocked again, this is the same thing that happened before\".     He is having a CAT scan at 0500 at [facility] in [place].     I will have [facility] push the images and have Dr. Barraza review.     I will be in touch with Brea once Dr. Barraza reviews.     "

## 2019-06-06 ENCOUNTER — ANESTHESIA EVENT (OUTPATIENT)
Dept: SURGERY | Facility: CLINIC | Age: 84
End: 2019-06-06
Payer: MEDICARE

## 2019-06-06 ASSESSMENT — LIFESTYLE VARIABLES: TOBACCO_USE: 0

## 2019-06-06 NOTE — ANESTHESIA PREPROCEDURE EVALUATION
Anesthesia Pre-Procedure Evaluation    Patient: Tim Cazares   MRN:     [id number] Gender:   male   Age:    84 year old :      1935        Preoperative Diagnosis: Cholangiocarcinoma   Procedure(s):  Endoscopic Retrograde Cholangiopancreatogram     Past Medical History:   Diagnosis Date     CKD (chronic kidney disease)      GERD (gastroesophageal reflux disease)      Hypertension       Past Surgical History:   Procedure Laterality Date     CHOLECYSTECTOMY       ENDOSCOPIC RETROGRADE CHOLANGIOPANCREATOGRAM N/A 10/9/2018    Procedure: COMBINED ENDOSCOPIC RETROGRADE CHOLANGIOPANCREATOGRAPHY, PLACE TUBE/STENT;;  Surgeon: Bravo Barraza MD;  Location: [facility]     ENDOSCOPIC RETROGRADE CHOLANGIOPANCREATOGRAM WITH SPYGLASS N/A 2018    Procedure: Endoscopic retrograde cholangiopancreatography with spyglass cholangioscopy, biliary biopsies, brushings, and stent exchange;  Surgeon: Bravo Barraza MD;  Location: [facility]     ENDOSCOPIC RETROGRADE CHOLANGIOPANCREATOGRAM WITH SPYGLASS N/A 2019    Procedure: Endoscopic Retrograde Cholangiopancreatogram with spyglass , bile duct stents exghanged, balloon sweep of bile duct for stones;  Surgeon: Bravo Barraza MD;  Location: [facility]     ENDOSCOPIC ULTRASOUND UPPER GASTROINTESTINAL TRACT (GI) N/A 10/9/2018    Procedure: ENDOSCOPIC ULTRASOUND, ESOPHAGOSCOPY / UPPER GASTROINTESTINAL TRACT (GI);   Endoscopic Retrograde Cholangiopancreatogram, pancreatic stent placement, biliary sphincterotomy, cholangioscopy, brushings and biopsies, biliary stent placement;  Surgeon: Bravo Barraza MD;  Location: [facility]     ESOPHAGOSCOPY, GASTROSCOPY, DUODENOSCOPY (EGD), COMBINED N/A 2018    Procedure: Upper Endoscopic Ultrasound with fine needle biopsy, ;  Surgeon: Bravo Barraza MD;  Location: [facility]     ESOPHAGOSCOPY, GASTROSCOPY, DUODENOSCOPY (EGD), COMBINED N/A 2019    Procedure: Upper Endoscopic Ultrasound with fine needle aspiration  of bile duct mass;  Surgeon: Rober Bernal MD;  Location: [facility]     HERNIA REPAIR       PROSTATE SURGERY            Anesthesia Evaluation     . Pt has had prior anesthetic. Type: General    No history of anesthetic complications          ROS/MED HX    ENT/Pulmonary:  - neg pulmonary ROS    (-) tobacco use and asthma   Neurologic:  - neg neurologic ROS     Cardiovascular:     (+) hypertension----. : . . . :. . Previous cardiac testing date:results:date: results:ECG reviewed date:5/26/19 results:SB 56 date: results:          METS/Exercise Tolerance:  4 - Raking leaves, gardening   Hematologic:  - neg hematologic  ROS       Musculoskeletal:   (+) arthritis,  -       GI/Hepatic:     (+) GERD Asymptomatic on medication, Other GI/Hepatic Extrinsic compression of biliary ducts; concern for cholangiocarcinoma      Renal/Genitourinary:     (+) chronic renal disease, type: CRI, Pt does not require dialysis, Pt has no history of transplant, BPH,       Endo:  - neg endo ROS       Psychiatric:  - neg psychiatric ROS       Infectious Disease:  - neg infectious disease ROS       Malignancy:   (+) Malignancy History of GI and Skin  GI CA  Active status post Chemo, Skin CA Remission status post Surgery, 6/3/19 CT: 1.  Small amount of free fluid in the pelvis. Otherwise stable exam. Stable pulmonary nodule.  2.  Stable biliary dilatation. Normal caliber bowel. No free air. Bilateral renal cysts are stable. Stable lymphadenopathy.        Other:                         PHYSICAL EXAM:   Mental Status/Neuro: A/A/O   Airway: Facies: Feasible  Mallampati: I  Mouth/Opening: Full  TM distance: > 6 cm  Neck ROM: Full   Respiratory: Auscultation: CTAB     Resp. Rate: Normal     Resp. Effort: Normal      CV: Rhythm: Regular  Rate: Age appropriate  Heart: Normal Sounds   Comments:      Dental: Normal                  Lab Results   Component Value Date    WBC 6.8 01/22/2019    HGB 14.2 01/22/2019    HCT 42.5 01/22/2019      "01/22/2019     01/22/2019    POTASSIUM 4.1 01/22/2019    CHLORIDE 105 01/22/2019    CO2 26 01/22/2019    BUN 18 01/22/2019    CR 1.22 01/22/2019    GLC 94 01/22/2019    LUZMARIA 9.0 01/22/2019    ALBUMIN 3.7 01/22/2019    PROTTOTAL 7.7 01/22/2019    ALT 41 01/22/2019    AST 27 01/22/2019    ALKPHOS 133 01/22/2019    BILITOTAL 0.6 01/22/2019    LIPASE 113 01/22/2019    AMYLASE 71 01/22/2019    TSH 2.61 09/11/2018       Preop Vitals  BP Readings from Last 3 Encounters:   01/22/19 147/76   11/05/18 129/68   10/10/18 146/71    Pulse Readings from Last 3 Encounters:   01/22/19 64   11/05/18 55   10/09/18 68      Resp Readings from Last 3 Encounters:   01/22/19 16   11/05/18 16   10/10/18 16    SpO2 Readings from Last 3 Encounters:   01/22/19 95%   11/05/18 98%   10/10/18 96%      Temp Readings from Last 1 Encounters:   01/22/19 36.6  C (97.9  F) (Oral)    Ht Readings from Last 1 Encounters:   01/22/19 1.676 m (5' 6\")      Wt Readings from Last 1 Encounters:   01/22/19 61.2 kg (134 lb 14.7 oz)    Estimated body mass index is 21.78 kg/m  as calculated from the following:    Height as of 1/22/19: 1.676 m (5' 6\").    Weight as of 1/22/19: 61.2 kg (134 lb 14.7 oz).     LDA:            Assessment:   ASA SCORE: 3    NPO Status: > 6 hours since completed Solid Foods   Documentation: H&P complete; Preop Testing complete; Consents complete   Proceeding: Proceed without further delay  Tobacco Use:  NO Active use of Tobacco/UNKNOWN Tobacco use status     Plan:   Anes. Type:  General      Induction:  IV (Standard)   Airway: Oral ETT   Access/Monitoring: PIV   Maintenance: Balanced   Emergence: Procedure Site   Logistics: Same Day Surgery     Postop Pain/Sedation Strategy:  Standard-Options: Opioids PRN     PONV Management:  Adult Risk Factors:, Non-Smoker, Postop Opioids  Prevention: Ondansetron     CONSENT: Direct conversation   Plan and risks discussed with: Patient   Blood Products: Consent Deferred (Minimal Blood Loss) "       Comments for Plan/Consent:  01/22/19; 1120; Mask Ventilation: Easy; Ease of Intubation: Easy; Airway Size: 7.5;  Cuffed;  Oral;  Blade Type: Esdras;  Blade Size: 4;  Place by: Nikia DE DIOS;  Insertion Attempts: 1;  Secured at (cm)to lip: 23 cm;  Breath Sounds: Equal, clear and bilateral;  End Tidal CO2: Present;  Dentition: Intact, Unchanged;  Grade View of Cords: 1                         Madhuri Che MD

## 2019-06-06 NOTE — TELEPHONE ENCOUNTER
Per Dr. Barraza: Lets add him on for tomorrow   He is well overdue for stent exchange   Bravo     Spoke with Brea (wife) and they would like to move procedure closer. She advised that tomorrow (6/7/19) will work and will await call from  with confirmed time.     MELY Scruggs Dr., Dr. Barraza, & Dr. Agosto  [facility]  [phone]

## 2019-06-07 ENCOUNTER — ANESTHESIA (OUTPATIENT)
Dept: SURGERY | Facility: CLINIC | Age: 84
End: 2019-06-07
Payer: MEDICARE

## 2019-06-07 ENCOUNTER — APPOINTMENT (OUTPATIENT)
Dept: GENERAL RADIOLOGY | Facility: CLINIC | Age: 84
End: 2019-06-07
Attending: INTERNAL MEDICINE
Payer: MEDICARE

## 2019-06-07 ENCOUNTER — HOSPITAL ENCOUNTER (OUTPATIENT)
Facility: CLINIC | Age: 84
Discharge: HOME | End: 2019-06-07
Attending: INTERNAL MEDICINE | Admitting: INTERNAL MEDICINE
Payer: MEDICARE

## 2019-06-07 VITALS
HEIGHT: 66 IN | OXYGEN SATURATION: 97 % | RESPIRATION RATE: 15 BRPM | DIASTOLIC BLOOD PRESSURE: 70 MMHG | SYSTOLIC BLOOD PRESSURE: 110 MMHG | WEIGHT: 127.21 LBS | HEART RATE: 66 BPM | BODY MASS INDEX: 20.44 KG/M2 | TEMPERATURE: 98 F

## 2019-06-07 DIAGNOSIS — K83.1 BILE DUCT STRICTURE (H): Primary | ICD-10-CM

## 2019-06-07 LAB
ALBUMIN SERPL-MCNC: 2.3 G/DL (ref 3.4–5)
ALP SERPL-CCNC: 562 U/L (ref 40–150)
ALT SERPL W P-5'-P-CCNC: 86 U/L (ref 0–70)
AMYLASE SERPL-CCNC: 54 U/L (ref 30–110)
ANION GAP SERPL CALCULATED.3IONS-SCNC: 7 MMOL/L (ref 3–14)
AST SERPL W P-5'-P-CCNC: 69 U/L (ref 0–45)
BILIRUB SERPL-MCNC: 1.6 MG/DL (ref 0.2–1.3)
BUN SERPL-MCNC: 18 MG/DL (ref 7–30)
CALCIUM SERPL-MCNC: 8.9 MG/DL (ref 8.5–10.1)
CHLORIDE SERPL-SCNC: 101 MMOL/L (ref 94–109)
CO2 SERPL-SCNC: 25 MMOL/L (ref 20–32)
CREAT SERPL-MCNC: 1.18 MG/DL (ref 0.66–1.25)
ERCP: NORMAL
ERYTHROCYTE [DISTWIDTH] IN BLOOD BY AUTOMATED COUNT: 15.1 % (ref 10–15)
GFR SERPL CREATININE-BSD FRML MDRD: 57 ML/MIN/{1.73_M2}
GLUCOSE BLDC GLUCOMTR-MCNC: 91 MG/DL (ref 70–99)
GLUCOSE SERPL-MCNC: 98 MG/DL (ref 70–99)
HCT VFR BLD AUTO: 34.7 % (ref 40–53)
HGB BLD-MCNC: 10.7 G/DL (ref 13.3–17.7)
LIPASE SERPL-CCNC: 112 U/L (ref 73–393)
MCH RBC QN AUTO: 32.3 PG (ref 26.5–33)
MCHC RBC AUTO-ENTMCNC: 30.8 G/DL (ref 31.5–36.5)
MCV RBC AUTO: 105 FL (ref 78–100)
PLATELET # BLD AUTO: 246 10E9/L (ref 150–450)
POTASSIUM SERPL-SCNC: 4.1 MMOL/L (ref 3.4–5.3)
PROT SERPL-MCNC: 6.8 G/DL (ref 6.8–8.8)
RBC # BLD AUTO: 3.31 10E12/L (ref 4.4–5.9)
SODIUM SERPL-SCNC: 133 MMOL/L (ref 133–144)
WBC # BLD AUTO: 9.9 10E9/L (ref 4–11)

## 2019-06-07 PROCEDURE — C1876 STENT, NON-COA/NON-COV W/DEL: HCPCS | Performed by: INTERNAL MEDICINE

## 2019-06-07 PROCEDURE — 36000055 ZZH SURGERY LEVEL 2 W FLUORO 1ST 30 MIN - UMMC: Performed by: INTERNAL MEDICINE

## 2019-06-07 PROCEDURE — 36415 COLL VENOUS BLD VENIPUNCTURE: CPT | Performed by: INTERNAL MEDICINE

## 2019-06-07 PROCEDURE — 37000008 ZZH ANESTHESIA TECHNICAL FEE, 1ST 30 MIN: Performed by: INTERNAL MEDICINE

## 2019-06-07 PROCEDURE — 25000128 H RX IP 250 OP 636: Performed by: NURSE ANESTHETIST, CERTIFIED REGISTERED

## 2019-06-07 PROCEDURE — 25000566 ZZH SEVOFLURANE, EA 15 MIN: Performed by: INTERNAL MEDICINE

## 2019-06-07 PROCEDURE — 40000170 ZZH STATISTIC PRE-PROCEDURE ASSESSMENT II: Performed by: INTERNAL MEDICINE

## 2019-06-07 PROCEDURE — 27210794 ZZH OR GENERAL SUPPLY STERILE: Performed by: INTERNAL MEDICINE

## 2019-06-07 PROCEDURE — 25000125 ZZHC RX 250: Performed by: INTERNAL MEDICINE

## 2019-06-07 PROCEDURE — 82150 ASSAY OF AMYLASE: CPT | Performed by: INTERNAL MEDICINE

## 2019-06-07 PROCEDURE — 36000053 ZZH SURGERY LEVEL 2 EA 15 ADDTL MIN - UMMC: Performed by: INTERNAL MEDICINE

## 2019-06-07 PROCEDURE — 25800030 ZZH RX IP 258 OP 636: Performed by: ANESTHESIOLOGY

## 2019-06-07 PROCEDURE — 25500064 ZZH RX 255 OP 636: Performed by: INTERNAL MEDICINE

## 2019-06-07 PROCEDURE — 80053 COMPREHEN METABOLIC PANEL: CPT | Performed by: INTERNAL MEDICINE

## 2019-06-07 PROCEDURE — 25000125 ZZHC RX 250: Performed by: NURSE ANESTHETIST, CERTIFIED REGISTERED

## 2019-06-07 PROCEDURE — 37000009 ZZH ANESTHESIA TECHNICAL FEE, EACH ADDTL 15 MIN: Performed by: INTERNAL MEDICINE

## 2019-06-07 PROCEDURE — 71000027 ZZH RECOVERY PHASE 2 EACH 15 MINS: Performed by: INTERNAL MEDICINE

## 2019-06-07 PROCEDURE — 40000277 XR SURGERY CARM FLUORO LESS THAN 5 MIN W STILLS: Mod: TC

## 2019-06-07 PROCEDURE — 25800030 ZZH RX IP 258 OP 636: Performed by: NURSE ANESTHETIST, CERTIFIED REGISTERED

## 2019-06-07 PROCEDURE — C1769 GUIDE WIRE: HCPCS | Performed by: INTERNAL MEDICINE

## 2019-06-07 PROCEDURE — 25000128 H RX IP 250 OP 636: Performed by: INTERNAL MEDICINE

## 2019-06-07 PROCEDURE — 82962 GLUCOSE BLOOD TEST: CPT

## 2019-06-07 PROCEDURE — 83690 ASSAY OF LIPASE: CPT | Performed by: INTERNAL MEDICINE

## 2019-06-07 PROCEDURE — A9270 NON-COVERED ITEM OR SERVICE: HCPCS | Performed by: INTERNAL MEDICINE

## 2019-06-07 PROCEDURE — 71000014 ZZH RECOVERY PHASE 1 LEVEL 2 FIRST HR: Performed by: INTERNAL MEDICINE

## 2019-06-07 PROCEDURE — 85027 COMPLETE CBC AUTOMATED: CPT | Performed by: INTERNAL MEDICINE

## 2019-06-07 PROCEDURE — C1726 CATH, BAL DIL, NON-VASCULAR: HCPCS | Performed by: INTERNAL MEDICINE

## 2019-06-07 DEVICE — STENT JOHLIN PANCREA WEDGE 10FRX22CM W/INTRO G26828
Type: IMPLANTABLE DEVICE | Site: BILE DUCT | Status: NON-FUNCTIONAL
Removed: 2019-09-09

## 2019-06-07 RX ORDER — METOPROLOL TARTRATE 1 MG/ML
1-2 INJECTION, SOLUTION INTRAVENOUS EVERY 5 MIN PRN
Status: DISCONTINUED | OUTPATIENT
Start: 2019-06-07 | End: 2019-06-07 | Stop reason: HOSPADM

## 2019-06-07 RX ORDER — FENTANYL CITRATE 50 UG/ML
25-50 INJECTION, SOLUTION INTRAMUSCULAR; INTRAVENOUS
Status: DISCONTINUED | OUTPATIENT
Start: 2019-06-07 | End: 2019-06-07 | Stop reason: HOSPADM

## 2019-06-07 RX ORDER — SODIUM CHLORIDE, SODIUM LACTATE, POTASSIUM CHLORIDE, CALCIUM CHLORIDE 600; 310; 30; 20 MG/100ML; MG/100ML; MG/100ML; MG/100ML
INJECTION, SOLUTION INTRAVENOUS CONTINUOUS
Status: DISCONTINUED | OUTPATIENT
Start: 2019-06-07 | End: 2019-06-07 | Stop reason: HOSPADM

## 2019-06-07 RX ORDER — INDOMETHACIN 50 MG/1
100 SUPPOSITORY RECTAL
Status: DISCONTINUED | OUTPATIENT
Start: 2019-06-07 | End: 2019-06-07 | Stop reason: HOSPADM

## 2019-06-07 RX ORDER — ONDANSETRON 4 MG/1
4 TABLET, ORALLY DISINTEGRATING ORAL EVERY 30 MIN PRN
Status: DISCONTINUED | OUTPATIENT
Start: 2019-06-07 | End: 2019-06-07 | Stop reason: HOSPADM

## 2019-06-07 RX ORDER — PROPOFOL 10 MG/ML
INJECTION, EMULSION INTRAVENOUS PRN
Status: DISCONTINUED | OUTPATIENT
Start: 2019-06-07 | End: 2019-06-07

## 2019-06-07 RX ORDER — ALBUTEROL SULFATE 0.83 MG/ML
2.5 SOLUTION RESPIRATORY (INHALATION) EVERY 4 HOURS PRN
Status: DISCONTINUED | OUTPATIENT
Start: 2019-06-07 | End: 2019-06-07 | Stop reason: HOSPADM

## 2019-06-07 RX ORDER — ONDANSETRON 2 MG/ML
INJECTION INTRAMUSCULAR; INTRAVENOUS PRN
Status: DISCONTINUED | OUTPATIENT
Start: 2019-06-07 | End: 2019-06-07

## 2019-06-07 RX ORDER — LIDOCAINE 40 MG/G
CREAM TOPICAL
Status: DISCONTINUED | OUTPATIENT
Start: 2019-06-07 | End: 2019-06-07 | Stop reason: HOSPADM

## 2019-06-07 RX ORDER — DEXAMETHASONE SODIUM PHOSPHATE 4 MG/ML
INJECTION, SOLUTION INTRA-ARTICULAR; INTRALESIONAL; INTRAMUSCULAR; INTRAVENOUS; SOFT TISSUE PRN
Status: DISCONTINUED | OUTPATIENT
Start: 2019-06-07 | End: 2019-06-07

## 2019-06-07 RX ORDER — NALOXONE HYDROCHLORIDE 0.4 MG/ML
.1-.4 INJECTION, SOLUTION INTRAMUSCULAR; INTRAVENOUS; SUBCUTANEOUS
Status: DISCONTINUED | OUTPATIENT
Start: 2019-06-07 | End: 2019-06-07 | Stop reason: HOSPADM

## 2019-06-07 RX ORDER — FENTANYL CITRATE 50 UG/ML
INJECTION, SOLUTION INTRAMUSCULAR; INTRAVENOUS PRN
Status: DISCONTINUED | OUTPATIENT
Start: 2019-06-07 | End: 2019-06-07

## 2019-06-07 RX ORDER — MEPERIDINE HYDROCHLORIDE 25 MG/ML
12.5 INJECTION INTRAMUSCULAR; INTRAVENOUS; SUBCUTANEOUS
Status: DISCONTINUED | OUTPATIENT
Start: 2019-06-07 | End: 2019-06-07 | Stop reason: HOSPADM

## 2019-06-07 RX ORDER — IOPAMIDOL 510 MG/ML
INJECTION, SOLUTION INTRAVASCULAR PRN
Status: DISCONTINUED | OUTPATIENT
Start: 2019-06-07 | End: 2019-06-07 | Stop reason: HOSPADM

## 2019-06-07 RX ORDER — ERTAPENEM 1 G/1
1 INJECTION, POWDER, LYOPHILIZED, FOR SOLUTION INTRAMUSCULAR; INTRAVENOUS
Status: COMPLETED | OUTPATIENT
Start: 2019-06-07 | End: 2019-06-07

## 2019-06-07 RX ORDER — LIDOCAINE HYDROCHLORIDE 20 MG/ML
INJECTION, SOLUTION INFILTRATION; PERINEURAL PRN
Status: DISCONTINUED | OUTPATIENT
Start: 2019-06-07 | End: 2019-06-07

## 2019-06-07 RX ORDER — AMOXICILLIN AND CLAVULANATE POTASSIUM 500; 125 MG/1; MG/1
1 TABLET, FILM COATED ORAL 2 TIMES DAILY
Qty: 10 TABLET | Refills: 0 | Status: SHIPPED | OUTPATIENT
Start: 2019-06-07 | End: 2019-11-21

## 2019-06-07 RX ORDER — ONDANSETRON 2 MG/ML
4 INJECTION INTRAMUSCULAR; INTRAVENOUS EVERY 30 MIN PRN
Status: DISCONTINUED | OUTPATIENT
Start: 2019-06-07 | End: 2019-06-07 | Stop reason: HOSPADM

## 2019-06-07 RX ORDER — HYDROMORPHONE HYDROCHLORIDE 1 MG/ML
.3-.5 INJECTION, SOLUTION INTRAMUSCULAR; INTRAVENOUS; SUBCUTANEOUS EVERY 10 MIN PRN
Status: DISCONTINUED | OUTPATIENT
Start: 2019-06-07 | End: 2019-06-07 | Stop reason: HOSPADM

## 2019-06-07 RX ORDER — DIMENHYDRINATE 50 MG/ML
25 INJECTION, SOLUTION INTRAMUSCULAR; INTRAVENOUS
Status: DISCONTINUED | OUTPATIENT
Start: 2019-06-07 | End: 2019-06-07 | Stop reason: HOSPADM

## 2019-06-07 RX ADMIN — PHENYLEPHRINE HYDROCHLORIDE 100 MCG: 10 INJECTION INTRAVENOUS at 07:55

## 2019-06-07 RX ADMIN — DEXAMETHASONE SODIUM PHOSPHATE 4 MG: 4 INJECTION, SOLUTION INTRA-ARTICULAR; INTRALESIONAL; INTRAMUSCULAR; INTRAVENOUS; SOFT TISSUE at 07:59

## 2019-06-07 RX ADMIN — LIDOCAINE HYDROCHLORIDE 100 MG: 20 INJECTION, SOLUTION INFILTRATION; PERINEURAL at 07:41

## 2019-06-07 RX ADMIN — ROCURONIUM BROMIDE 50 MG: 10 INJECTION INTRAVENOUS at 07:42

## 2019-06-07 RX ADMIN — SODIUM CHLORIDE, POTASSIUM CHLORIDE, SODIUM LACTATE AND CALCIUM CHLORIDE: 600; 310; 30; 20 INJECTION, SOLUTION INTRAVENOUS at 07:31

## 2019-06-07 RX ADMIN — ERTAPENEM SODIUM 1 G: 1 INJECTION, POWDER, LYOPHILIZED, FOR SOLUTION INTRAMUSCULAR; INTRAVENOUS at 07:59

## 2019-06-07 RX ADMIN — SUGAMMADEX 200 MG: 100 INJECTION, SOLUTION INTRAVENOUS at 08:27

## 2019-06-07 RX ADMIN — PHENYLEPHRINE HYDROCHLORIDE 100 MCG: 10 INJECTION INTRAVENOUS at 08:08

## 2019-06-07 RX ADMIN — ONDANSETRON 4 MG: 2 INJECTION INTRAMUSCULAR; INTRAVENOUS at 08:17

## 2019-06-07 RX ADMIN — PHENYLEPHRINE HYDROCHLORIDE 100 MCG: 10 INJECTION INTRAVENOUS at 07:47

## 2019-06-07 RX ADMIN — PROPOFOL 110 MG: 10 INJECTION, EMULSION INTRAVENOUS at 07:41

## 2019-06-07 RX ADMIN — FENTANYL CITRATE 100 MCG: 50 INJECTION, SOLUTION INTRAMUSCULAR; INTRAVENOUS at 07:41

## 2019-06-07 RX ADMIN — PHENYLEPHRINE HYDROCHLORIDE 200 MCG: 10 INJECTION INTRAVENOUS at 08:03

## 2019-06-07 ASSESSMENT — MIFFLIN-ST. JEOR: SCORE: 1219.75

## 2019-06-07 NOTE — SIGNIFICANT EVENT
SPIRITUAL HEALTH SERVICES  [facility] ([place] 3C   PRE-SURGERY VISIT    Had pre-surgery visit with pt. and family. Provided spiritual support, prayer.    PATIENT ANOINTED by Father Hector Kee 6/7/2019     Hector Kee M.Div.     Pager [phone]

## 2019-06-07 NOTE — DISCHARGE INSTRUCTIONS
- Repeat ERCP in 8-10 weeks with intention for exchange    - Continue a course of oral antibiotic as prescribed  [facility], [place]  Same-Day Surgery   Adult Discharge Orders & Instructions     For 24 hours after surgery    1. Get plenty of rest.  A responsible adult must stay with you for at least 24 hours after you leave the hospital.   2. Do not drive or use heavy equipment.  If you have weakness or tingling, don't drive or use heavy equipment until this feeling goes away.  3. Do not drink alcohol.  4. Avoid strenuous or risky activities.  Ask for help when climbing stairs.   5. You may feel lightheaded.  IF so, sit for a few minutes before standing.  Have someone help you get up.   6. If you have nausea (feel sick to your stomach): Drink only clear liquids such as apple juice, ginger ale, broth or 7-Up.  Rest may also help.  Be sure to drink enough fluids.  Move to a regular diet as you feel able.  7. You may have a slight fever. Call the doctor if your fever is over 100 F (37.7 C) (taken under the tongue) or lasts longer than 24 hours.  8. You may have a dry mouth, a sore throat, muscle aches or trouble sleeping.  These should go away after 24 hours.  9. Do not make important or legal decisions.   Call your doctor for any of the followin.  Signs of infection (fever, growing tenderness at the surgery site, a large amount of drainage or bleeding, severe pain, foul-smelling drainage, redness, swelling).    2. It has been over 8 to 10 hours since surgery and you are still not able to urinate (pass water).    3.  Headache for over 24 hours.    To contact a doctor, call Dr Barraza at [phone] (clinic) or:        [phone] and ask for the resident on call for Gastroenterology (answered 24 hours a day)      Emergency Department:    [facility]: [phone]       (TTY for hearing impaired: [phone])    [facility]: [phone]       (TTY for hearing impaired:  [phone])

## 2019-06-07 NOTE — ANESTHESIA POSTPROCEDURE EVALUATION
Anesthesia POST Procedure Evaluation    Patient: Tim Cazares   MRN:     [id number] Gender:   male   Age:    84 year old :      1935        Preoperative Diagnosis: Cholangiocarcinoma   Procedure(s):  Endoscopic Retrograde Cholangiopancreatogram with biliary stent exchange and stone removal   Postop Comments: No value filed.       Anesthesia Type:  General  No value filed.    Reportable Event: NO     PAIN: Uncomplicated   Sign Out status: Comfortable, Well controlled pain     PONV: No PONV   Sign Out status:  No Nausea or Vomiting     Neuro/Psych: Uneventful perioperative course   Sign Out Status: Preoperative baseline; Age appropriate mentation     Airway/Resp.: Uneventful perioperative course   Sign Out Status: Non labored breathing, age appropriate RR; Resp. Status within EXPECTED Parameters     CV: Uneventful perioperative course   Sign Out status: Appropriate BP and perfusion indices; Appropriate HR/Rhythm     Disposition:   Sign Out in:  PACU  Disposition:  Phase II; Home  Recovery Course: Uneventful  Follow-Up: Not required           Last Anesthesia Record Vitals:  CRNA VITALS  2019 0802 - 2019 0900      2019             Resp Rate (observed):  18          Last PACU Vitals:  Vitals Value Taken Time   /60 2019  8:50 AM   Temp 37.1  C (98.8  F) 2019  9:00 AM   Pulse 73 2019  8:50 AM   Resp 12 2019  9:00 AM   SpO2 98 % 2019  9:00 AM   Temp src     NIBP     Pulse     SpO2     Resp     Temp     Ht Rate     Temp 2     Vitals shown include unvalidated device data.      Electronically Signed By: Madhuri Che MD, 2019, 9:00 AM

## 2019-06-07 NOTE — ANESTHESIA CARE TRANSFER NOTE
Patient: Tim Cazares    Procedure(s):  Endoscopic Retrograde Cholangiopancreatogram with biliary stent exchange and stone removal    Diagnosis: Cholangiocarcinoma  Diagnosis Additional Information: No value filed.    Anesthesia Type:   No value filed.     Note:  Airway :Face Mask  Patient transferred to:PACU  Comments: Pt. Pink and breathing spontaneously.  Vitals stable.  Report given to oncoming nurse.  Transfer of care occurred.Handoff Report: Identifed the Patient, Identified the Reponsible Provider, Reviewed the pertinent medical history, Discussed the surgical course, Reviewed Intra-OP anesthesia mangement and issues during anesthesia, Set expectations for post-procedure period and Allowed opportunity for questions and acknowledgement of understanding      Vitals: (Last set prior to Anesthesia Care Transfer)    CRNA VITALS  6/7/2019 0802 - 6/7/2019 0838      6/7/2019             Resp Rate (observed):  18                Electronically Signed By: ARMAAN Marcelino CRNA  June 7, 2019  8:38 AM

## 2019-06-07 NOTE — OP NOTE
ERCP 06/07/2019  7:14 AM [facility], [address] [place] [address] [phone]     Endoscopy Department   _______________________________________________________________________________   Patient Name: Tim Cazares         Procedure Date: 6/7/2019 7:14 AM   MRN: [id number]                       Account Number: [id number]   YOB: 1935               Admit Type: Outpatient   Age: 84                               Room: OR   Gender: Male                          Note Status: Finalized   Attending MD: Bravo Barraza MD   Total Sedation Time:   _______________________________________________________________________________       Procedure:           ERCP   Indications:         Suspected ascending cholangitis, Malignant liver duct                        tumor, Stent change, Bile duct stricture   Providers:           Bravo Barraza MD   Patient Profile:     Mr Cazares is an 85yo gentleman with biopsy proven                        cholangiocarcinoma who is undergoing chemoradiation                         therapy. He was away in [place] for the winter and only                        just returned and has had plastic stents in place                        without change since January. He now presents with                        fevers and proceeds to ERCP for further management.   Referring MD:        Jesus Alberto Nova MD   Medicines:           General Anesthesia, Antibiotics as scheduled, Invanz 1 g                        IV   Complications:       No immediate complications.   _______________________________________________________________________________   Procedure:           Pre-Anesthesia Assessment:                        - Prior to the procedure, a History and Physical was                        performed, and patient medications and allergies were                        reviewed. The patient is competent. The risks and                        benefits of the  procedure and the sedation options and                        risks were discussed with the patient. All questions                        were answered and informed consent was obtained. Patient                        identification and proposed procedure were verified by                        the nurse in the pre-procedure area. Mental Status                        Examination: alert and oriented. Airway Examination:                        Mallampati Class II (the uvula but not tonsillar pillars                        visualized). Respiratory Examination: clear to                        auscultation. CV Examination: tachycardia noted. ASA                        Grade Assessment: III - A patient with severe systemic                        disease. After reviewing the risks and benefits, the                        patient was deemed in satisfactory condition to undergo                        the procedure. The anesthesia plan was to use general                        anesthesia. Immediately prior to administration of                        medications, the patient was re-assessed for adequacy to                        receive sedatives. The heart rate, respiratory rate,                        oxygen saturations, blood pressure, adequacy of                        pulmonary ventilation, and response to care were                        monitored throughout the procedure. The physical status                        of the patient was re-assessed after the procedure.                        After obtaining informed consent, the scope was passed                        under direct vision. Throughout the procedure, the                        patient's blood pressure, pulse, and oxygen saturations                        were monitored continuously. The duodenoscope was                        introduced through the mouth, and used to inject                        contrast into and used for direct visualization of the            "             bile duct. The ERCP was accomplished without difficulty.                        The patient tolerated the procedure well.                                                                                    Findings:        A  film demonstrated stable position of the two biliary stents.        Limited white light views of the foregut were notable for partial to        full occlusion of the biliary stents which were appropriately        internalized across a patent biliary sphincterotomy. The two stents were        removed from the biliary tree using a snare. The bile duct was deeply        cannulated with the short-nosed traction sphincterotome in concert with        multiple 0.025\" Visiglide wires. Contrast was injected and I personally        interpreted the bile duct images. Ductal flow of contrast was adequate,        image quality was excellent and contrast extended throughout the        intrahepatics. As previous there were malignant strictures of the        bifurcation, involving the three primaries with mild peripheral dilation        of the intrahepatics. The common below the bifuraction was unremarkable        and there were no overt filling defect. The biliary tree was swept with        a 12 mm occusion balloon starting at the left intrahepatic duct(s) and        right intrahepatic duct(s). Debris (stone concretion) and pus was swept        from the duct. A decision was made not to prolong the procedure in this        setting and metal stents were deferred. A 10 Fr by 21 cm transpapillary        Johlin stent with no external flaps and no internal flaps was placed 20        cm into the left intrahepatics. A 10 Fr by 18 cm transpapillary Johlin        stent with no external flaps and no internal flaps was placed 17 cm into        the right posterior intrahepatics. Bile flowed through the stents and        the stent were in good position. The ventral pancreatic duct and orifice        were " selectively not interrogated during this procedure.                                                                                     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                        (involving the bifurcation and each primary insertion)                        with diffuse mild upstream dilation                        - Successful removal of stone concretion (stent related)                        - Evidence of cholangitis with a small amount of pus                        drainage                        - A decision was made not to prolong the procedure in                        this setting and metal stents were deferred                        - Successful placement of two 10F Johlin stents deep to                        the right and left intrahepatics   Recommendation:      - General anesthesia recovery with low threshold for                        admission pending immediate postprocedure course                        - Repeat ERCP in 8-10 weeks with intention for exchange                        to metal stents assuming interval course is uncomplicated                        - Continue a course of oral antibiotic as prescribed                        - The findings and recommendations were discussed with                        the patient and their family                                                                                       electronically signed by DAISY Barraza   _____________________   Bravo Barraza MD   6/7/2019 8:43:28 AM   I was physically present for the entire viewing portion of the exam.   __________________________   Signature of teaching physician   Rocio/Pam Barraza MD   Number of Addenda: 0     Note Initiated On: 6/7/2019 7:14 AM

## 2019-06-11 ENCOUNTER — TELEPHONE (OUTPATIENT)
Dept: GASTROENTEROLOGY | Facility: CLINIC | Age: 84
End: 2019-06-11

## 2019-06-11 ENCOUNTER — CARE COORDINATION (OUTPATIENT)
Dept: GASTROENTEROLOGY | Facility: CLINIC | Age: 84
End: 2019-06-11

## 2019-06-11 DIAGNOSIS — C22.1 CHOLANGIOCARCINOMA (H): Primary | ICD-10-CM

## 2019-06-11 NOTE — TELEPHONE ENCOUNTER
Received phone call from patient's wife Brea in regards to scheduling f/u procedure for patient. Brea stated she spoke with Alyssia ford about recommendations for f/u procedure. Brea stated that 9/9/19 is best for their schedule due to transportation. She stated their son will be bringing them down for the procedure. Informed Brea the patient is scheduled with Dr. Barraza on 9/9/19. Informed Brea the patient will need an updated pre-op physical within 30 days of the procedure. Brea stated he will have this done with his PCP. Informed Brea the patient will need a  and someone to monitor him for 24 hours after the procedure. Informed Brea all scheduling details will be sent to the address listed in Epic. Address confirmed on this call.     6/11/19 317pm

## 2019-06-11 NOTE — PROGRESS NOTES
Post ERCP (6/7/19) with Dr. Barraza: Follow-up     Post procedure recommendations: - Repeat ERCP in 8-10 weeks with intention for exchange to metal stents assuming interval course is uncomplicated    Orders placed: ERCP     Brea stated that he is feeling good. Denies nausea, vomiting, fever and abdominal pain. Eating and drinking PO with no issue.      MELY Scruggs Dr., Dr. Barraza, & Dr. Agosto  [facility]  [phone]

## 2019-09-08 ENCOUNTER — ANESTHESIA EVENT (OUTPATIENT)
Dept: SURGERY | Facility: CLINIC | Age: 84
End: 2019-09-08
Payer: MEDICARE

## 2019-09-08 ASSESSMENT — LIFESTYLE VARIABLES: TOBACCO_USE: 1

## 2019-09-08 NOTE — ANESTHESIA PREPROCEDURE EVALUATION
Anesthesia Pre-Procedure Evaluation    Patient: Tim Cazares   MRN:     [id number] Gender:   male   Age:    84 year old :      1935        Preoperative Diagnosis: Cholangiocarcinoma   Procedure(s):  Endoscopic Retrograde Cholangiopancreatogram     Past Medical History:   Diagnosis Date     CKD (chronic kidney disease)      GERD (gastroesophageal reflux disease)      Hypertension       Past Surgical History:   Procedure Laterality Date     CHOLECYSTECTOMY       ENDOSCOPIC RETROGRADE CHOLANGIOPANCREATOGRAM N/A 10/9/2018    Procedure: COMBINED ENDOSCOPIC RETROGRADE CHOLANGIOPANCREATOGRAPHY, PLACE TUBE/STENT;;  Surgeon: Bravo Barraza MD;  Location: [facility]     ENDOSCOPIC RETROGRADE CHOLANGIOPANCREATOGRAM WITH SPYGLASS N/A 2018    Procedure: Endoscopic retrograde cholangiopancreatography with spyglass cholangioscopy, biliary biopsies, brushings, and stent exchange;  Surgeon: Bravo Barraza MD;  Location: [facility]     ENDOSCOPIC RETROGRADE CHOLANGIOPANCREATOGRAM WITH SPYGLASS N/A 2019    Procedure: Endoscopic Retrograde Cholangiopancreatogram with spyglass , bile duct stents exghanged, balloon sweep of bile duct for stones;  Surgeon: Bravo Barraza MD;  Location: [facility]     ENDOSCOPIC RETROGRADE CHOLANGIOPANCREATOGRAPHY, EXCHANGE TUBE/STENT N/A 2019    Procedure: Endoscopic Retrograde Cholangiopancreatogram with biliary stent exchange and stone removal;  Surgeon: Bravo Barraza MD;  Location: [facility]     ENDOSCOPIC ULTRASOUND UPPER GASTROINTESTINAL TRACT (GI) N/A 10/9/2018    Procedure: ENDOSCOPIC ULTRASOUND, ESOPHAGOSCOPY / UPPER GASTROINTESTINAL TRACT (GI);   Endoscopic Retrograde Cholangiopancreatogram, pancreatic stent placement, biliary sphincterotomy, cholangioscopy, brushings and biopsies, biliary stent placement;  Surgeon: Bravo Barraza MD;  Location: [facility]     ESOPHAGOSCOPY, GASTROSCOPY, DUODENOSCOPY (EGD), COMBINED N/A 2018    Procedure: Upper  Endoscopic Ultrasound with fine needle biopsy, ;  Surgeon: Bravo Barraza MD;  Location: [facility]     ESOPHAGOSCOPY, GASTROSCOPY, DUODENOSCOPY (EGD), COMBINED N/A 1/22/2019    Procedure: Upper Endoscopic Ultrasound with fine needle aspiration of bile duct mass;  Surgeon: Rober Bernal MD;  Location: [facility]     HERNIA REPAIR       PROSTATE SURGERY            Anesthesia Evaluation     . Pt has had prior anesthetic. Type: General    No history of anesthetic complications          ROS/MED HX    ENT/Pulmonary:  - neg pulmonary ROS   (+)tobacco use, Past use 0.5 packs/day  , . .    Neurologic: Comment: B/L hearing impairment  - neg neurologic ROS     Cardiovascular:     (+) hypertension----. : . . . :. . Previous cardiac testing date:results:date: results:ECG reviewed date:5/26/19 results:SB 56, ?LAD date: results:          METS/Exercise Tolerance:  4 - Raking leaves, gardening   Hematologic:  - neg hematologic  ROS       Musculoskeletal:   (+) arthritis,  -       GI/Hepatic:     (+) GERD Asymptomatic on medication, Other GI/Hepatic cholangiocarcinoma      Renal/Genitourinary:     (+) chronic renal disease, type: CRI, Pt does not require dialysis, Pt has no history of transplant, BPH,       Endo:  - neg endo ROS       Psychiatric:  - neg psychiatric ROS       Infectious Disease:  - neg infectious disease ROS       Malignancy:   (+) Malignancy History of GI and Skin  GI CA  Active status post Chemo, Skin CA Remission status post Surgery, 6/3/19 CT: 1.  Small amount of free fluid in the pelvis. Otherwise stable exam. Stable pulmonary nodule.  2.  Stable biliary dilatation. Normal caliber bowel. No free air. Bilateral renal cysts are stable. Stable lymphadenopathy.        Other: Comment: Allergies:   -- Hydralazine -- Other (See Comments)    --  Irregular heart rate             Tingling in hands and feet             Sore throat   -- Penicillin G    -- Strawberries (Strawberry)    -- Sulfa Drugs    -- Isosorbide  -- Other (See Comments)                        PHYSICAL EXAM:   Mental Status/Neuro: A/A/O   Airway: Facies: Feasible  Mallampati: I  Mouth/Opening: Full  TM distance: > 6 cm  Neck ROM: Full   Respiratory: Auscultation: CTAB     Resp. Rate: Normal     Resp. Effort: Normal      CV: Rhythm: Regular  Rate: Age appropriate  Heart: Normal Sounds  Edema: None   Comments: Few premolars missing. No loose teeth.                      LABS:  CBC:   Lab Results   Component Value Date    WBC 9.9 06/07/2019    WBC 6.8 01/22/2019    HGB 10.7 (L) 06/07/2019    HGB 14.2 01/22/2019    HCT 34.7 (L) 06/07/2019    HCT 42.5 01/22/2019     06/07/2019     01/22/2019     BMP:   Lab Results   Component Value Date     06/07/2019     01/22/2019    POTASSIUM 4.1 06/07/2019    POTASSIUM 4.1 01/22/2019    CHLORIDE 101 06/07/2019    CHLORIDE 105 01/22/2019    CO2 25 06/07/2019    CO2 26 01/22/2019    BUN 18 06/07/2019    BUN 18 01/22/2019    CR 1.18 06/07/2019    CR 1.22 01/22/2019    GLC 98 06/07/2019    GLC 94 01/22/2019     COAGS: No results found for: PTT, INR, FIBR  POC:   Lab Results   Component Value Date    BGM 91 06/07/2019     OTHER:   Lab Results   Component Value Date    LUZMARIA 8.9 06/07/2019    ALBUMIN 2.3 (L) 06/07/2019    PROTTOTAL 6.8 06/07/2019    ALT 86 (H) 06/07/2019    AST 69 (H) 06/07/2019    ALKPHOS 562 (H) 06/07/2019    BILITOTAL 1.6 (H) 06/07/2019    LIPASE 112 06/07/2019    AMYLASE 54 06/07/2019    TSH 2.61 09/11/2018        Preop Vitals    BP Readings from Last 3 Encounters:   06/07/19 110/70   01/22/19 147/76   11/05/18 129/68    Pulse Readings from Last 3 Encounters:   06/07/19 66   01/22/19 64   11/05/18 55      Resp Readings from Last 3 Encounters:   06/07/19 15   01/22/19 16   11/05/18 16    SpO2 Readings from Last 3 Encounters:   06/07/19 97%   01/22/19 95%   11/05/18 98%      Temp Readings from Last 1 Encounters:   06/07/19 36.7  C (98  F)    Ht Readings from Last 1 Encounters:   06/07/19  "1.676 m (5' 6\")      Wt Readings from Last 1 Encounters:   06/07/19 57.7 kg (127 lb 3.3 oz)    Estimated body mass index is 20.53 kg/m  as calculated from the following:    Height as of 6/7/19: 1.676 m (5' 6\").    Weight as of 6/7/19: 57.7 kg (127 lb 3.3 oz).     LDA:        Assessment: Elective due to   ASA SCORE: 3    H&P: History and physical reviewed and following examination; no interval change.        - H&P complete; Preop Testing complete; Consents complete  Smoking Status:  Non-Smoker/Unknown   NPO Status: > 6 hours since completed Solid Foods     Plan:   Anes. Type:  General (General)   Pre-Medication: NSAID   Induction:  IV (Standard)   Airway: ETT; Oral   Access/Monitoring: PIV   Maintenance: Balanced     Postop Plan:   Postop Pain: Opioids  Postop Sedation/Airway: Not planned     PONV Management:   Adult Risk Factors:, Non-Smoker, Postop Opioids   Prevention: Ondansetron, Dexamethasone     CONSENT: Direct conversation   Plan and risks discussed with: Patient   Blood Products: Consent Deferred (Minimal Blood Loss)                   Luis Tavarez MD  "

## 2019-09-09 ENCOUNTER — APPOINTMENT (OUTPATIENT)
Dept: GENERAL RADIOLOGY | Facility: CLINIC | Age: 84
End: 2019-09-09
Attending: INTERNAL MEDICINE
Payer: MEDICARE

## 2019-09-09 ENCOUNTER — HOSPITAL ENCOUNTER (OUTPATIENT)
Facility: CLINIC | Age: 84
Discharge: HOME | End: 2019-09-09
Attending: INTERNAL MEDICINE | Admitting: INTERNAL MEDICINE
Payer: MEDICARE

## 2019-09-09 ENCOUNTER — ANESTHESIA (OUTPATIENT)
Dept: SURGERY | Facility: CLINIC | Age: 84
End: 2019-09-09
Payer: MEDICARE

## 2019-09-09 VITALS
SYSTOLIC BLOOD PRESSURE: 125 MMHG | WEIGHT: 136.24 LBS | OXYGEN SATURATION: 100 % | RESPIRATION RATE: 16 BRPM | DIASTOLIC BLOOD PRESSURE: 56 MMHG | HEART RATE: 57 BPM | BODY MASS INDEX: 21.9 KG/M2 | TEMPERATURE: 97.5 F | HEIGHT: 66 IN

## 2019-09-09 DIAGNOSIS — K83.1 BILE DUCT STRICTURE (H): Primary | ICD-10-CM

## 2019-09-09 LAB
ALBUMIN SERPL-MCNC: 3.4 G/DL (ref 3.4–5)
ALP SERPL-CCNC: 95 U/L (ref 40–150)
ALT SERPL W P-5'-P-CCNC: 31 U/L (ref 0–70)
AMYLASE SERPL-CCNC: 82 U/L (ref 30–110)
ANION GAP SERPL CALCULATED.3IONS-SCNC: 6 MMOL/L (ref 3–14)
AST SERPL W P-5'-P-CCNC: 21 U/L (ref 0–45)
BILIRUB SERPL-MCNC: 1.1 MG/DL (ref 0.2–1.3)
BUN SERPL-MCNC: 16 MG/DL (ref 7–30)
CALCIUM SERPL-MCNC: 9 MG/DL (ref 8.5–10.1)
CHLORIDE SERPL-SCNC: 102 MMOL/L (ref 94–109)
CO2 SERPL-SCNC: 29 MMOL/L (ref 20–32)
CREAT SERPL-MCNC: 1.15 MG/DL (ref 0.66–1.25)
ERCP: NORMAL
ERYTHROCYTE [DISTWIDTH] IN BLOOD BY AUTOMATED COUNT: 13.4 % (ref 10–15)
GFR SERPL CREATININE-BSD FRML MDRD: 59 ML/MIN/{1.73_M2}
GLUCOSE BLDC GLUCOMTR-MCNC: 99 MG/DL (ref 70–99)
GLUCOSE SERPL-MCNC: 97 MG/DL (ref 70–99)
HCT VFR BLD AUTO: 35.8 % (ref 40–53)
HGB BLD-MCNC: 11.6 G/DL (ref 13.3–17.7)
LIPASE SERPL-CCNC: 121 U/L (ref 73–393)
MCH RBC QN AUTO: 32.7 PG (ref 26.5–33)
MCHC RBC AUTO-ENTMCNC: 32.4 G/DL (ref 31.5–36.5)
MCV RBC AUTO: 101 FL (ref 78–100)
PLATELET # BLD AUTO: 189 10E9/L (ref 150–450)
POTASSIUM SERPL-SCNC: 4.3 MMOL/L (ref 3.4–5.3)
PROT SERPL-MCNC: 6.8 G/DL (ref 6.8–8.8)
RBC # BLD AUTO: 3.55 10E12/L (ref 4.4–5.9)
SODIUM SERPL-SCNC: 136 MMOL/L (ref 133–144)
WBC # BLD AUTO: 5 10E9/L (ref 4–11)

## 2019-09-09 PROCEDURE — 25800030 ZZH RX IP 258 OP 636: Performed by: ANESTHESIOLOGY

## 2019-09-09 PROCEDURE — 25800030 ZZH RX IP 258 OP 636: Performed by: STUDENT IN AN ORGANIZED HEALTH CARE EDUCATION/TRAINING PROGRAM

## 2019-09-09 PROCEDURE — 37000009 ZZH ANESTHESIA TECHNICAL FEE, EACH ADDTL 15 MIN: Performed by: INTERNAL MEDICINE

## 2019-09-09 PROCEDURE — 71000014 ZZH RECOVERY PHASE 1 LEVEL 2 FIRST HR: Performed by: INTERNAL MEDICINE

## 2019-09-09 PROCEDURE — 40000277 XR SURGERY CARM FLUORO LESS THAN 5 MIN W STILLS: Mod: TC

## 2019-09-09 PROCEDURE — 25000132 ZZH RX MED GY IP 250 OP 250 PS 637: Mod: GY | Performed by: STUDENT IN AN ORGANIZED HEALTH CARE EDUCATION/TRAINING PROGRAM

## 2019-09-09 PROCEDURE — 37000008 ZZH ANESTHESIA TECHNICAL FEE, 1ST 30 MIN: Performed by: INTERNAL MEDICINE

## 2019-09-09 PROCEDURE — 27210794 ZZH OR GENERAL SUPPLY STERILE: Performed by: INTERNAL MEDICINE

## 2019-09-09 PROCEDURE — 71000027 ZZH RECOVERY PHASE 2 EACH 15 MINS: Performed by: INTERNAL MEDICINE

## 2019-09-09 PROCEDURE — 25000125 ZZHC RX 250: Performed by: INTERNAL MEDICINE

## 2019-09-09 PROCEDURE — 82150 ASSAY OF AMYLASE: CPT | Performed by: INTERNAL MEDICINE

## 2019-09-09 PROCEDURE — 82962 GLUCOSE BLOOD TEST: CPT

## 2019-09-09 PROCEDURE — 85027 COMPLETE CBC AUTOMATED: CPT | Performed by: INTERNAL MEDICINE

## 2019-09-09 PROCEDURE — 25000128 H RX IP 250 OP 636: Performed by: STUDENT IN AN ORGANIZED HEALTH CARE EDUCATION/TRAINING PROGRAM

## 2019-09-09 PROCEDURE — 25000566 ZZH SEVOFLURANE, EA 15 MIN: Performed by: INTERNAL MEDICINE

## 2019-09-09 PROCEDURE — 36415 COLL VENOUS BLD VENIPUNCTURE: CPT | Performed by: INTERNAL MEDICINE

## 2019-09-09 PROCEDURE — C1769 GUIDE WIRE: HCPCS | Performed by: INTERNAL MEDICINE

## 2019-09-09 PROCEDURE — 36000055 ZZH SURGERY LEVEL 2 W FLUORO 1ST 30 MIN - UMMC: Performed by: INTERNAL MEDICINE

## 2019-09-09 PROCEDURE — C1876 STENT, NON-COA/NON-COV W/DEL: HCPCS | Performed by: INTERNAL MEDICINE

## 2019-09-09 PROCEDURE — C1726 CATH, BAL DIL, NON-VASCULAR: HCPCS | Performed by: INTERNAL MEDICINE

## 2019-09-09 PROCEDURE — 80053 COMPREHEN METABOLIC PANEL: CPT | Performed by: INTERNAL MEDICINE

## 2019-09-09 PROCEDURE — 40000170 ZZH STATISTIC PRE-PROCEDURE ASSESSMENT II: Performed by: INTERNAL MEDICINE

## 2019-09-09 PROCEDURE — 36000053 ZZH SURGERY LEVEL 2 EA 15 ADDTL MIN - UMMC: Performed by: INTERNAL MEDICINE

## 2019-09-09 PROCEDURE — 83690 ASSAY OF LIPASE: CPT | Performed by: INTERNAL MEDICINE

## 2019-09-09 PROCEDURE — 25000125 ZZHC RX 250: Performed by: STUDENT IN AN ORGANIZED HEALTH CARE EDUCATION/TRAINING PROGRAM

## 2019-09-09 PROCEDURE — 25500064 ZZH RX 255 OP 636: Performed by: INTERNAL MEDICINE

## 2019-09-09 DEVICE — STENT JOHLIN PANCREA WEDGE 10FRX22CM W/INTRO G26828
Type: IMPLANTABLE DEVICE | Site: BILE DUCT | Status: NON-FUNCTIONAL
Removed: 2019-11-25

## 2019-09-09 DEVICE — STENT JOHLIN PANCREA WEDGE 10FRX20CM W/INTRO G26827
Type: IMPLANTABLE DEVICE | Site: BILE DUCT | Status: NON-FUNCTIONAL
Removed: 2019-11-25

## 2019-09-09 RX ORDER — CIPROFLOXACIN 2 MG/ML
INJECTION, SOLUTION INTRAVENOUS PRN
Status: DISCONTINUED | OUTPATIENT
Start: 2019-09-09 | End: 2019-09-09

## 2019-09-09 RX ORDER — ACETAMINOPHEN 325 MG/1
975 TABLET ORAL ONCE
Status: COMPLETED | OUTPATIENT
Start: 2019-09-09 | End: 2019-09-09

## 2019-09-09 RX ORDER — LABETALOL HYDROCHLORIDE 5 MG/ML
10 INJECTION, SOLUTION INTRAVENOUS
Status: CANCELLED | OUTPATIENT
Start: 2019-09-09

## 2019-09-09 RX ORDER — IOPAMIDOL 510 MG/ML
INJECTION, SOLUTION INTRAVASCULAR PRN
Status: DISCONTINUED | OUTPATIENT
Start: 2019-09-09 | End: 2019-09-09 | Stop reason: HOSPADM

## 2019-09-09 RX ORDER — ONDANSETRON 4 MG/1
4 TABLET, ORALLY DISINTEGRATING ORAL EVERY 30 MIN PRN
Status: CANCELLED | OUTPATIENT
Start: 2019-09-09

## 2019-09-09 RX ORDER — PROPOFOL 10 MG/ML
INJECTION, EMULSION INTRAVENOUS PRN
Status: DISCONTINUED | OUTPATIENT
Start: 2019-09-09 | End: 2019-09-09

## 2019-09-09 RX ORDER — CIPROFLOXACIN 500 MG/1
500 TABLET, FILM COATED ORAL 2 TIMES DAILY
Qty: 10 TABLET | Refills: 0 | Status: SHIPPED | OUTPATIENT
Start: 2019-09-09 | End: 2019-11-21

## 2019-09-09 RX ORDER — SODIUM CHLORIDE, SODIUM LACTATE, POTASSIUM CHLORIDE, CALCIUM CHLORIDE 600; 310; 30; 20 MG/100ML; MG/100ML; MG/100ML; MG/100ML
INJECTION, SOLUTION INTRAVENOUS CONTINUOUS PRN
Status: DISCONTINUED | OUTPATIENT
Start: 2019-09-09 | End: 2019-09-09

## 2019-09-09 RX ORDER — LIDOCAINE 40 MG/G
CREAM TOPICAL
Status: DISCONTINUED | OUTPATIENT
Start: 2019-09-09 | End: 2019-09-09 | Stop reason: HOSPADM

## 2019-09-09 RX ORDER — LIDOCAINE HYDROCHLORIDE 20 MG/ML
INJECTION, SOLUTION INFILTRATION; PERINEURAL PRN
Status: DISCONTINUED | OUTPATIENT
Start: 2019-09-09 | End: 2019-09-09

## 2019-09-09 RX ORDER — FENTANYL CITRATE 50 UG/ML
25-50 INJECTION, SOLUTION INTRAMUSCULAR; INTRAVENOUS
Status: CANCELLED | OUTPATIENT
Start: 2019-09-09

## 2019-09-09 RX ORDER — NALOXONE HYDROCHLORIDE 0.4 MG/ML
.1-.4 INJECTION, SOLUTION INTRAMUSCULAR; INTRAVENOUS; SUBCUTANEOUS
Status: CANCELLED | OUTPATIENT
Start: 2019-09-09 | End: 2019-09-10

## 2019-09-09 RX ORDER — INDOMETHACIN 50 MG/1
100 SUPPOSITORY RECTAL
Status: DISCONTINUED | OUTPATIENT
Start: 2019-09-09 | End: 2019-09-09 | Stop reason: HOSPADM

## 2019-09-09 RX ORDER — ONDANSETRON 2 MG/ML
4 INJECTION INTRAMUSCULAR; INTRAVENOUS EVERY 30 MIN PRN
Status: CANCELLED | OUTPATIENT
Start: 2019-09-09

## 2019-09-09 RX ORDER — ONDANSETRON 2 MG/ML
INJECTION INTRAMUSCULAR; INTRAVENOUS PRN
Status: DISCONTINUED | OUTPATIENT
Start: 2019-09-09 | End: 2019-09-09

## 2019-09-09 RX ORDER — SODIUM CHLORIDE, SODIUM LACTATE, POTASSIUM CHLORIDE, CALCIUM CHLORIDE 600; 310; 30; 20 MG/100ML; MG/100ML; MG/100ML; MG/100ML
INJECTION, SOLUTION INTRAVENOUS CONTINUOUS
Status: CANCELLED | OUTPATIENT
Start: 2019-09-09

## 2019-09-09 RX ORDER — EPHEDRINE SULFATE 50 MG/ML
INJECTION, SOLUTION INTRAMUSCULAR; INTRAVENOUS; SUBCUTANEOUS PRN
Status: DISCONTINUED | OUTPATIENT
Start: 2019-09-09 | End: 2019-09-09

## 2019-09-09 RX ORDER — DEXAMETHASONE SODIUM PHOSPHATE 4 MG/ML
INJECTION, SOLUTION INTRA-ARTICULAR; INTRALESIONAL; INTRAMUSCULAR; INTRAVENOUS; SOFT TISSUE PRN
Status: DISCONTINUED | OUTPATIENT
Start: 2019-09-09 | End: 2019-09-09

## 2019-09-09 RX ORDER — FENTANYL CITRATE 50 UG/ML
INJECTION, SOLUTION INTRAMUSCULAR; INTRAVENOUS PRN
Status: DISCONTINUED | OUTPATIENT
Start: 2019-09-09 | End: 2019-09-09

## 2019-09-09 RX ADMIN — Medication 5 MG: at 10:50

## 2019-09-09 RX ADMIN — ACETAMINOPHEN 975 MG: 325 TABLET, FILM COATED ORAL at 10:14

## 2019-09-09 RX ADMIN — ROCURONIUM BROMIDE 50 MG: 10 INJECTION INTRAVENOUS at 10:31

## 2019-09-09 RX ADMIN — FENTANYL CITRATE 50 MCG: 50 INJECTION, SOLUTION INTRAMUSCULAR; INTRAVENOUS at 10:30

## 2019-09-09 RX ADMIN — ONDANSETRON 4 MG: 2 INJECTION INTRAMUSCULAR; INTRAVENOUS at 11:05

## 2019-09-09 RX ADMIN — SODIUM CHLORIDE, POTASSIUM CHLORIDE, SODIUM LACTATE AND CALCIUM CHLORIDE: 600; 310; 30; 20 INJECTION, SOLUTION INTRAVENOUS at 10:14

## 2019-09-09 RX ADMIN — SODIUM CHLORIDE, POTASSIUM CHLORIDE, SODIUM LACTATE AND CALCIUM CHLORIDE 500 ML: 600; 310; 30; 20 INJECTION, SOLUTION INTRAVENOUS at 10:09

## 2019-09-09 RX ADMIN — DEXAMETHASONE SODIUM PHOSPHATE 4 MG: 4 INJECTION, SOLUTION INTRA-ARTICULAR; INTRALESIONAL; INTRAMUSCULAR; INTRAVENOUS; SOFT TISSUE at 10:52

## 2019-09-09 RX ADMIN — LIDOCAINE HYDROCHLORIDE 100 MG: 20 INJECTION, SOLUTION INFILTRATION; PERINEURAL at 10:30

## 2019-09-09 RX ADMIN — PROPOFOL 70 MG: 10 INJECTION, EMULSION INTRAVENOUS at 10:31

## 2019-09-09 RX ADMIN — CIPROFLOXACIN 400 MG: 2 INJECTION INTRAVENOUS at 10:36

## 2019-09-09 RX ADMIN — SODIUM CHLORIDE, POTASSIUM CHLORIDE, SODIUM LACTATE AND CALCIUM CHLORIDE 1000 ML: 600; 310; 30; 20 INJECTION, SOLUTION INTRAVENOUS at 10:10

## 2019-09-09 RX ADMIN — SUGAMMADEX 200 MG: 100 INJECTION, SOLUTION INTRAVENOUS at 11:17

## 2019-09-09 ASSESSMENT — MIFFLIN-ST. JEOR: SCORE: 1260.75

## 2019-09-09 NOTE — OP NOTE
ERCP 09/09/2019 10:10 AM [facility], [address] [place] [address] [phone]     Endoscopy Department   _______________________________________________________________________________   Patient Name: Tim Cazares         Procedure Date: 9/9/2019 10:10 AM   MRN: [id number]                       Account Number: [id number]   YOB: 1935               Admit Type: Outpatient   Age: 84                               Room: OR   Gender: Male                          Note Status: Finalized   Attending MD: Bravo Barraza MD   Total Sedation Time:   _______________________________________________________________________________       Procedure:           ERCP   Indications:         Cholangiocarcinoma, Stent change   Providers:           Bravo Barraza MD   Patient Profile:     Mr Cazares is an 85yo gentleman with biopsy proven                        cholangiocarcinoma whose chemotherapy has been paused                        and returns on schedule for exchange of the biliary                        stents. After a long discussion with the family, we                        decided to defer exchange to metal stents in favor of                        waiting until after interval imaging and follow up visit                        with oncology in November. We proceed to ERCP for                        exchange of plastic stents.   Referring MD:        Jesus Alberto Nova MD   Medicines:           General Anesthesia, Indomethacin not given due to                        contraindication, Cipro 400 mg IV   Complications:       No immediate complications.   _______________________________________________________________________________   Procedure:           Pre-Anesthesia Assessment:                        - Prior to the procedure, a History and Physical was                        performed, and patient medications and allergies were                        reviewed. The  patient is competent. The risks and                        benefits of the procedure and the sedation options and                         risks were discussed with the patient. All questions                        were answered and informed consent was obtained. Patient                        identification and proposed procedure were verified by                        the nurse in the pre-procedure area. Mental Status                        Examination: alert and oriented. Airway Examination:                        Mallampati Class II (the uvula but not tonsillar pillars                        visualized). Respiratory Examination: clear to                        auscultation. CV Examination: normal. ASA Grade                        Assessment: III - A patient with severe systemic                        disease. After reviewing the risks and benefits, the                        patient was deemed in satisfactory condition to undergo                        the procedure. The anesthesia plan was to use general                        anesthesia. Immediately prior to administration of                        medications, the patient was re-assessed for adequacy to                        receive sedatives. The heart rate, respiratory rate,                        oxygen saturations, blood pressure, adequacy of                        pulmonary ventilation, and response to care were                        monitored throughout the procedure. The physical status                        of the patient was re-assessed after the procedure.                        After obtaining informed consent, the scope was passed                        under direct vision. Throughout the procedure, the                        patient's blood pressure, pulse, and oxygen saturations                        were monitored continuously. The duodenoscope was                        introduced through the mouth, and used to inject                        " contrast into and used to inject contrast into the bile                        duct. The ERCP was accomplished without difficulty. The                        patient tolerated the procedure well.                                                                                     Findings:        A  film demonstrated stable position of the two biliary stents.        Limited white light views of the foregut were notable for partial to        full occlusion of the biliary stents which were appropriately        internalized across a patent biliary sphincterotomy. The two stents were        removed from the biliary tree using a snare. The bile duct was deeply        cannulated with the short-nosed traction sphincterotome in concert with        multiple 0.025\" Visiglide wires. Contrast was injected and I personally        interpreted the bile duct images. Ductal flow of contrast was        adequate,image quality was excellent and contrast extended throughout        theintrahepatics. As previous there were malignant strictures of the        bifurcation, involving the three primaries now without peripheral        dilation of the intrahepatics. The common below the bifuraction was        unremarkable and there were no overt filling defect. The biliary tree        was swept with a 12 mm occusion balloon starting at the left        intrahepatic duct(s) and right intrahepatic duct(s). Debris (stone        concretion) was swept from the duct. A decision was again made not to        place metal stents until after interval imaging. A 10 Fr by 22 cm        transpapillary Johlin stent with no external flaps and no internal flaps        was placed 21 cm into the left intrahepatics. A 10 Fr by 17 cm        transpapillary Johlin stent with no external flaps and no internal flaps        was placed 16 cm into the right posterior intrahepatics. Bile flowed        through the stents and the stent were in good position. The ventral "        pancreatic duct and orifice were selectively not interrogated during        this procedure.                                                                                     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                        involving the bifurcation and each primary insertion)                        now without upstream dilation                        - Successful removal of stone concretion (stent related)                        - Successful placement of two 10F Johlin stents deep to                        the right and left intrahepatics   Recommendation:      - General anesthesia recovery with probable discharge                        home this afternoon                        - Repeat ERCP in 12 weeks with intention for exchange                        toÂ metal stents assuming interval course is                        uncomplicated and this is consistent with findings from                        his interval imaging and clinic visit with oncology in                        November                        - Complete the short course of antibiotic as prescribed                        - The findings and recommendations were discussed with                        the patient and their family                                                                                       electronically signed by DAISY Barraza   _____________________   Bravo Barraza MD   9/9/2019 11:28:08 AM   I was physically present for the entire viewing portion of the exam.   __________________________   Signature of teaching physician   Rocio/Pam Barraza MD

## 2019-09-09 NOTE — DISCHARGE INSTRUCTIONS
- Repeat ERCP in 12 weeks     - Complete the short course of antibiotic as prescribed       REMEMBER: SAME DAY SURGERY IS NOT SAME DAY RECOVERY. TAKE IT EASY, GET PLENTY OF REST, AND HAVE SOMEONE WITH YOU FOR 24 HOURS. FOLLOW THESE INSTRUCTIONS AND PLEASE DO NOT HESITATE TO CALL WITH ANY QUESTIONS.       [facility], [place]  Same-Day Surgery   Adult Discharge Orders & Instructions     For 24 hours after surgery    1. Get plenty of rest.  A responsible adult must stay with you for at least 24 hours after you leave the hospital.   2. Do not drive or use heavy equipment.  If you have weakness or tingling, don't drive or use heavy equipment until this feeling goes away.  3. Do not drink alcohol.  4. Avoid strenuous or risky activities.  Ask for help when climbing stairs.   5. You may feel lightheaded.  IF so, sit for a few minutes before standing.  Have someone help you get up.   6. If you have nausea (feel sick to your stomach): Drink only clear liquids such as apple juice, ginger ale, broth or 7-Up.  Rest may also help.  Be sure to drink enough fluids.  Move to a regular diet as you feel able.  7. You may have a slight fever. Call the doctor if your fever is over 100 F (37.7 C) (taken under the tongue) or lasts longer than 24 hours.  8. You may have a dry mouth, a sore throat, muscle aches or trouble sleeping.  These should go away after 24 hours.  9. Do not make important or legal decisions.   Call your doctor for any of the followin.  Signs of infection (fever, growing tenderness at the surgery site, a large amount of drainage or bleeding, severe pain, foul-smelling drainage, redness, swelling).    2. It has been over 8 to 10 hours since surgery and you are still not able to urinate (pass water).    3.  Headache for over 24 hours.    To contact a doctor, call Dr. Barraza at [phone] (clinic) or:        [phone] and ask for the resident on call for Gastroenterology  (answered 24 hours a day)      Emergency Department:    [facility]: [phone]       (TTY for hearing impaired: [phone])

## 2019-09-09 NOTE — ANESTHESIA CARE TRANSFER NOTE
Patient: Tim Cazares    Procedure(s):  Endoscopic Retrograde Cholangiopancreatogram with biliary stent exchange and stone removal    Diagnosis: Cholangiocarcinoma  Diagnosis Additional Information: No value filed.    Anesthesia Type:   General     Note:  Airway :Face Mask  Patient transferred to:PACU  Comments: To PACU, VSS, pt awake and alert, exchanging well, report to RN, care accepted.Handoff Report: Identifed the Patient, Identified the Reponsible Provider, Reviewed the pertinent medical history, Discussed the surgical course, Reviewed Intra-OP anesthesia mangement and issues during anesthesia, Set expectations for post-procedure period and Allowed opportunity for questions and acknowledgement of understanding      Vitals: (Last set prior to Anesthesia Care Transfer)    CRNA VITALS  9/9/2019 1055 - 9/9/2019 1129      9/9/2019             Pulse:  73    Ht Rate:  75    SpO2:  100 %    Resp Rate (observed):  14                Electronically Signed By: ARMAAN Warren CRNA  September 9, 2019  11:29 AM

## 2019-09-09 NOTE — SIGNIFICANT EVENT
SPIRITUAL HEALTH SERVICES  [facility] 3C   PRE-SURGERY VISIT    Had pre-surgery visit with pt. Provided spiritual support, prayer.     PATIENT ANOINTED by Father Hector Kee 9/9/2019     Hector Kee M.Div.     Pager [phone]

## 2019-09-09 NOTE — ANESTHESIA POSTPROCEDURE EVALUATION
Anesthesia POST Procedure Evaluation    Patient: Tim Cazares   MRN:     [id number] Gender:   male   Age:    84 year old :      1935        Preoperative Diagnosis: Cholangiocarcinoma   Procedure(s):  Endoscopic Retrograde Cholangiopancreatogram with biliary stent exchange and stone removal   Postop Comments: No value filed.       Anesthesia Type:  General  General    Reportable Event: NO     PAIN: Uncomplicated   Sign Out status: Comfortable, Well controlled pain     PONV: No PONV   Sign Out status:  No Nausea or Vomiting     Neuro/Psych: Uneventful perioperative course   Sign Out Status: Preoperative baseline; Age appropriate mentation     Airway/Resp.: Uneventful perioperative course   Sign Out Status: Non labored breathing, age appropriate RR; Resp. Status within EXPECTED Parameters     CV: Uneventful perioperative course   Sign Out status: Appropriate BP and perfusion indices; Appropriate HR/Rhythm     Disposition:   Sign Out in:  PACU  Disposition:  Phase II; Home  Recovery Course: Uneventful  Follow-Up: Not required           Last Anesthesia Record Vitals:  CRNA VITALS  2019 1055 - 2019 1143      2019             SpO2:  100 %    Resp Rate (observed):  16    EKG:  Sinus rhythm          Last PACU Vitals:  Vitals Value Taken Time   /70 2019 11:30 AM   Temp 36.1  C (97  F) 2019 11:30 AM   Pulse 59 2019 11:30 AM   Resp 16 2019 11:30 AM   SpO2 99 % 2019 11:42 AM   Temp src     NIBP     Pulse     SpO2 100 % 2019 11:30 AM   Resp     Temp     Ht Rate     Temp 2     Vitals shown include unvalidated device data.      Electronically Signed By: Adolfo Stover MD, 2019, 11:43 AM

## 2019-09-11 ENCOUNTER — CARE COORDINATION (OUTPATIENT)
Dept: GASTROENTEROLOGY | Facility: CLINIC | Age: 84
End: 2019-09-11

## 2019-09-11 ENCOUNTER — TELEPHONE (OUTPATIENT)
Dept: GASTROENTEROLOGY | Facility: CLINIC | Age: 84
End: 2019-09-11

## 2019-09-11 DIAGNOSIS — C22.1 CHOLANGIOCARCINOMA (H): Primary | ICD-10-CM

## 2019-09-11 NOTE — PROGRESS NOTES
Post ERCP (9/9/19) with Dr. Barraza: Follow-up     Post procedure recommendations: Repeat ERCP in 12 weeks with intention for exchange toÂ metal stents assuming interval course is uncomplicated and this is consistent with findings from  his interval imaging and clinic visit with oncology in November     Orders placed: ERCP     Brea states he is feeling great- he is currently taking a nap.  Denies nausea, vomiting, fever and abdominal pain. Eating and drinking PO with no issues. Transferred to [facility] to schedule ERCP.     Clinic contact and scheduling numbers verified for future questions/concerns.      MELY Scruggs Dr., Dr. Barraza, & Dr. Agosto  Advanced Endoscopy  [phone]

## 2019-09-11 NOTE — TELEPHONE ENCOUNTER
Spoke to patient's wife Brea in regards to scheduled procedure. Informed her the patient he is scheduled with Dr. Barraza on 11/25/19 per their request. Informed her the patient will need an updated pre-op physical within 30 days of his procedure. Patient stated the is going to have this done locally. Informed her the patient will need a  and someone to monitor him for 24 hours after the procedure. Informed patient all scheduling details will be sent to the address listed on Epic. Address confirmed on this call.     9/11/19 310pm

## 2019-11-21 RX ORDER — GABAPENTIN 100 MG/1
100 CAPSULE ORAL AT BEDTIME
Status: ON HOLD | COMMUNITY
End: 2019-11-25

## 2019-11-24 ENCOUNTER — ANESTHESIA EVENT (OUTPATIENT)
Dept: SURGERY | Facility: CLINIC | Age: 84
End: 2019-11-24
Payer: MEDICARE

## 2019-11-24 ASSESSMENT — LIFESTYLE VARIABLES: TOBACCO_USE: 1

## 2019-11-24 NOTE — ANESTHESIA PREPROCEDURE EVALUATION
Anesthesia Pre-Procedure Evaluation    Patient: Tim Cazares   MRN:     [id number] Gender:   male   Age:    84 year old :      1935        Preoperative Diagnosis: Cholangiocarcinoma   Procedure(s):  Endoscopic Retrograde Cholangiopancreatogram     Past Medical History:   Diagnosis Date     CKD (chronic kidney disease)      GERD (gastroesophageal reflux disease)      Hypertension       Past Surgical History:   Procedure Laterality Date     CHOLECYSTECTOMY       ENDOSCOPIC RETROGRADE CHOLANGIOPANCREATOGRAM N/A 10/9/2018    Procedure: COMBINED ENDOSCOPIC RETROGRADE CHOLANGIOPANCREATOGRAPHY, PLACE TUBE/STENT;;  Surgeon: Bravo Barraza MD;  Location: [facility]     ENDOSCOPIC RETROGRADE CHOLANGIOPANCREATOGRAM WITH SPYGLASS N/A 2018    Procedure: Endoscopic retrograde cholangiopancreatography with spyglass cholangioscopy, biliary biopsies, brushings, and stent exchange;  Surgeon: Bravo Barraza MD;  Location: [facility]     ENDOSCOPIC RETROGRADE CHOLANGIOPANCREATOGRAM WITH SPYGLASS N/A 2019    Procedure: Endoscopic Retrograde Cholangiopancreatogram with spyglass , bile duct stents exghanged, balloon sweep of bile duct for stones;  Surgeon: Bravo Barraza MD;  Location: [facility]     ENDOSCOPIC RETROGRADE CHOLANGIOPANCREATOGRAPHY, EXCHANGE TUBE/STENT N/A 2019    Procedure: Endoscopic Retrograde Cholangiopancreatogram with biliary stent exchange and stone removal;  Surgeon: Bravo Barraza MD;  Location: [facility]     ENDOSCOPIC RETROGRADE CHOLANGIOPANCREATOGRAPHY, EXCHANGE TUBE/STENT N/A 2019    Procedure: Endoscopic Retrograde Cholangiopancreatogram with biliary stent exchange and stone removal;  Surgeon: Bravo Barraza MD;  Location: [facility]     ENDOSCOPIC ULTRASOUND UPPER GASTROINTESTINAL TRACT (GI) N/A 10/9/2018    Procedure: ENDOSCOPIC ULTRASOUND, ESOPHAGOSCOPY / UPPER GASTROINTESTINAL TRACT (GI);   Endoscopic Retrograde Cholangiopancreatogram, pancreatic stent  placement, biliary sphincterotomy, cholangioscopy, brushings and biopsies, biliary stent placement;  Surgeon: Bravo Barraza MD;  Location: [facility]     ESOPHAGOSCOPY, GASTROSCOPY, DUODENOSCOPY (EGD), COMBINED N/A 11/5/2018    Procedure: Upper Endoscopic Ultrasound with fine needle biopsy, ;  Surgeon: Bravo Barraza MD;  Location: [facility]     ESOPHAGOSCOPY, GASTROSCOPY, DUODENOSCOPY (EGD), COMBINED N/A 1/22/2019    Procedure: Upper Endoscopic Ultrasound with fine needle aspiration of bile duct mass;  Surgeon: Rober Bernal MD;  Location: [facility]     HERNIA REPAIR       PROSTATE SURGERY            Anesthesia Evaluation     . Pt has had prior anesthetic. Type: General    No history of anesthetic complications          ROS/MED HX    ENT/Pulmonary:  - neg pulmonary ROS   (+)tobacco use, Past use 0.5 packs/day  , . .    Neurologic: Comment: B/L hearing impairment  - neg neurologic ROS     Cardiovascular:     (+) hypertension----. : . . . :. . Previous cardiac testing date:results:date: results:ECG reviewed date:5/26/19 results:SB 56, ?LAD date: results:          METS/Exercise Tolerance:  4 - Raking leaves, gardening   Hematologic:  - neg hematologic  ROS       Musculoskeletal:   (+) arthritis,  -       GI/Hepatic:     (+) GERD Asymptomatic on medication, Other GI/Hepatic cholangiocarcinoma      Renal/Genitourinary:     (+) chronic renal disease, type: CRI, Pt does not require dialysis, Pt has no history of transplant, BPH,       Endo:  - neg endo ROS       Psychiatric:  - neg psychiatric ROS       Infectious Disease:  - neg infectious disease ROS       Malignancy:   (+) Malignancy History of GI and Skin  GI CA  Active status post Chemo, Skin CA Remission status post Surgery, 6/3/19 CT: 1.  Small amount of free fluid in the pelvis. Otherwise stable exam. Stable pulmonary nodule.  2.  Stable biliary dilatation. Normal caliber bowel. No free air. Bilateral renal cysts are stable. Stable lymphadenopathy.         Other:    (+) H/O Chronic Pain,                       PHYSICAL EXAM:   Mental Status/Neuro: A/A/O   Airway: Facies: Feasible  Mallampati: I  Mouth/Opening: Full  TM distance: > 6 cm  Neck ROM: Full   Respiratory: Auscultation: CTAB     Resp. Rate: Normal     Resp. Effort: Normal      CV: Rhythm: Regular  Rate: Age appropriate  Heart: Normal Sounds  Edema: None   Comments:      Dental: Normal Dentition                LABS:  CBC:   Lab Results   Component Value Date    WBC 5.0 09/09/2019    WBC 9.9 06/07/2019    HGB 11.6 (L) 09/09/2019    HGB 10.7 (L) 06/07/2019    HCT 35.8 (L) 09/09/2019    HCT 34.7 (L) 06/07/2019     09/09/2019     06/07/2019     BMP:   Lab Results   Component Value Date     09/09/2019     06/07/2019    POTASSIUM 4.3 09/09/2019    POTASSIUM 4.1 06/07/2019    CHLORIDE 102 09/09/2019    CHLORIDE 101 06/07/2019    CO2 29 09/09/2019    CO2 25 06/07/2019    BUN 16 09/09/2019    BUN 18 06/07/2019    CR 1.15 09/09/2019    CR 1.18 06/07/2019    GLC 97 09/09/2019    GLC 98 06/07/2019     COAGS: No results found for: PTT, INR, FIBR  POC:   Lab Results   Component Value Date    BGM 99 09/09/2019     OTHER:   Lab Results   Component Value Date    LUZMARIA 9.0 09/09/2019    ALBUMIN 3.4 09/09/2019    PROTTOTAL 6.8 09/09/2019    ALT 31 09/09/2019    AST 21 09/09/2019    ALKPHOS 95 09/09/2019    BILITOTAL 1.1 09/09/2019    LIPASE 121 09/09/2019    AMYLASE 82 09/09/2019    TSH 2.61 09/11/2018        Preop Vitals    BP Readings from Last 3 Encounters:   09/09/19 125/56   06/07/19 110/70   01/22/19 147/76    Pulse Readings from Last 3 Encounters:   09/09/19 57   06/07/19 66   01/22/19 64      Resp Readings from Last 3 Encounters:   09/09/19 16   06/07/19 15   01/22/19 16    SpO2 Readings from Last 3 Encounters:   09/09/19 100%   06/07/19 97%   01/22/19 95%      Temp Readings from Last 1 Encounters:   09/09/19 36.4  C (97.5  F) (Oral)    Ht Readings from Last 1 Encounters:   09/09/19 1.676 m  "(5' 6\")      Wt Readings from Last 1 Encounters:   09/09/19 61.8 kg (136 lb 3.9 oz)    Estimated body mass index is 21.99 kg/m  as calculated from the following:    Height as of 9/9/19: 1.676 m (5' 6\").    Weight as of 9/9/19: 61.8 kg (136 lb 3.9 oz).     LDA:        Assessment:   ASA SCORE: 3    H&P: History and physical reviewed and following examination; no interval change.   Smoking Status:  Non-Smoker/Unknown        Plan:   Anes. Type:  General   Pre-Medication: None   Induction:  IV (Standard)   Airway: ETT; Oral   Access/Monitoring: PIV   Maintenance: Balanced     Postop Plan:   Postop Pain: Opioids  Postop Sedation/Airway: Not planned  Disposition: Outpatient     PONV Management:   Adult Risk Factors:, Non-Smoker, Postop Opioids   Prevention: Ondansetron, Dexamethasone     CONSENT: Direct conversation   Plan and risks discussed with: Patient   Blood Products: Consent Deferred (Minimal Blood Loss)       Comments for Plan/Consent:  Fiberoptic intubation for teaching purposes                   Whitney Souza MD  "

## 2019-11-25 ENCOUNTER — APPOINTMENT (OUTPATIENT)
Dept: GENERAL RADIOLOGY | Facility: CLINIC | Age: 84
End: 2019-11-25
Attending: INTERNAL MEDICINE
Payer: MEDICARE

## 2019-11-25 ENCOUNTER — HOSPITAL ENCOUNTER (OUTPATIENT)
Facility: CLINIC | Age: 84
Discharge: HOME | End: 2019-11-25
Attending: INTERNAL MEDICINE | Admitting: INTERNAL MEDICINE
Payer: MEDICARE

## 2019-11-25 ENCOUNTER — ANESTHESIA (OUTPATIENT)
Dept: SURGERY | Facility: CLINIC | Age: 84
End: 2019-11-25
Payer: MEDICARE

## 2019-11-25 VITALS
TEMPERATURE: 96 F | WEIGHT: 132.5 LBS | RESPIRATION RATE: 16 BRPM | HEIGHT: 66 IN | DIASTOLIC BLOOD PRESSURE: 77 MMHG | SYSTOLIC BLOOD PRESSURE: 137 MMHG | OXYGEN SATURATION: 100 % | BODY MASS INDEX: 21.29 KG/M2 | HEART RATE: 58 BPM

## 2019-11-25 DIAGNOSIS — K83.1 BILE DUCT STRICTURE (H): Primary | ICD-10-CM

## 2019-11-25 LAB
ALBUMIN SERPL-MCNC: 3.6 G/DL (ref 3.4–5)
ALP SERPL-CCNC: 107 U/L (ref 40–150)
ALT SERPL W P-5'-P-CCNC: 30 U/L (ref 0–70)
AMYLASE SERPL-CCNC: 74 U/L (ref 30–110)
ANION GAP SERPL CALCULATED.3IONS-SCNC: 5 MMOL/L (ref 3–14)
AST SERPL W P-5'-P-CCNC: 19 U/L (ref 0–45)
BILIRUB SERPL-MCNC: 0.4 MG/DL (ref 0.2–1.3)
BUN SERPL-MCNC: 16 MG/DL (ref 7–30)
CALCIUM SERPL-MCNC: 9.7 MG/DL (ref 8.5–10.1)
CHLORIDE SERPL-SCNC: 103 MMOL/L (ref 94–109)
CO2 SERPL-SCNC: 27 MMOL/L (ref 20–32)
CREAT SERPL-MCNC: 1.11 MG/DL (ref 0.66–1.25)
ERCP: NORMAL
ERYTHROCYTE [DISTWIDTH] IN BLOOD BY AUTOMATED COUNT: 13 % (ref 10–15)
GFR SERPL CREATININE-BSD FRML MDRD: 61 ML/MIN/{1.73_M2}
GLUCOSE BLDC GLUCOMTR-MCNC: 109 MG/DL (ref 70–99)
GLUCOSE SERPL-MCNC: 99 MG/DL (ref 70–99)
HCT VFR BLD AUTO: 37.3 % (ref 40–53)
HGB BLD-MCNC: 12.6 G/DL (ref 13.3–17.7)
LIPASE SERPL-CCNC: 121 U/L (ref 73–393)
MCH RBC QN AUTO: 33.4 PG (ref 26.5–33)
MCHC RBC AUTO-ENTMCNC: 33.8 G/DL (ref 31.5–36.5)
MCV RBC AUTO: 99 FL (ref 78–100)
PLATELET # BLD AUTO: 230 10E9/L (ref 150–450)
POTASSIUM SERPL-SCNC: 3.8 MMOL/L (ref 3.4–5.3)
PROT SERPL-MCNC: 7.5 G/DL (ref 6.8–8.8)
RBC # BLD AUTO: 3.77 10E12/L (ref 4.4–5.9)
SODIUM SERPL-SCNC: 136 MMOL/L (ref 133–144)
WBC # BLD AUTO: 7.7 10E9/L (ref 4–11)

## 2019-11-25 PROCEDURE — 85027 COMPLETE CBC AUTOMATED: CPT | Performed by: INTERNAL MEDICINE

## 2019-11-25 PROCEDURE — 25500064 ZZH RX 255 OP 636: Performed by: INTERNAL MEDICINE

## 2019-11-25 PROCEDURE — 25000125 ZZHC RX 250: Performed by: INTERNAL MEDICINE

## 2019-11-25 PROCEDURE — 71000014 ZZH RECOVERY PHASE 1 LEVEL 2 FIRST HR: Performed by: INTERNAL MEDICINE

## 2019-11-25 PROCEDURE — 25000566 ZZH SEVOFLURANE, EA 15 MIN: Performed by: INTERNAL MEDICINE

## 2019-11-25 PROCEDURE — 80053 COMPREHEN METABOLIC PANEL: CPT | Performed by: INTERNAL MEDICINE

## 2019-11-25 PROCEDURE — 36000055 ZZH SURGERY LEVEL 2 W FLUORO 1ST 30 MIN - UMMC: Performed by: INTERNAL MEDICINE

## 2019-11-25 PROCEDURE — 83690 ASSAY OF LIPASE: CPT | Performed by: INTERNAL MEDICINE

## 2019-11-25 PROCEDURE — 25000125 ZZHC RX 250: Performed by: STUDENT IN AN ORGANIZED HEALTH CARE EDUCATION/TRAINING PROGRAM

## 2019-11-25 PROCEDURE — 71000027 ZZH RECOVERY PHASE 2 EACH 15 MINS: Performed by: INTERNAL MEDICINE

## 2019-11-25 PROCEDURE — 36415 COLL VENOUS BLD VENIPUNCTURE: CPT | Performed by: INTERNAL MEDICINE

## 2019-11-25 PROCEDURE — 37000008 ZZH ANESTHESIA TECHNICAL FEE, 1ST 30 MIN: Performed by: INTERNAL MEDICINE

## 2019-11-25 PROCEDURE — C1769 GUIDE WIRE: HCPCS | Performed by: INTERNAL MEDICINE

## 2019-11-25 PROCEDURE — 40000065 ZZH STATISTIC EKG NON-CHARGEABLE

## 2019-11-25 PROCEDURE — 93010 ELECTROCARDIOGRAM REPORT: CPT | Mod: 59 | Performed by: INTERNAL MEDICINE

## 2019-11-25 PROCEDURE — 40000170 ZZH STATISTIC PRE-PROCEDURE ASSESSMENT II: Performed by: INTERNAL MEDICINE

## 2019-11-25 PROCEDURE — 25000128 H RX IP 250 OP 636: Performed by: STUDENT IN AN ORGANIZED HEALTH CARE EDUCATION/TRAINING PROGRAM

## 2019-11-25 PROCEDURE — 36000053 ZZH SURGERY LEVEL 2 EA 15 ADDTL MIN - UMMC: Performed by: INTERNAL MEDICINE

## 2019-11-25 PROCEDURE — C1876 STENT, NON-COA/NON-COV W/DEL: HCPCS | Performed by: INTERNAL MEDICINE

## 2019-11-25 PROCEDURE — C1726 CATH, BAL DIL, NON-VASCULAR: HCPCS | Performed by: INTERNAL MEDICINE

## 2019-11-25 PROCEDURE — 37000009 ZZH ANESTHESIA TECHNICAL FEE, EACH ADDTL 15 MIN: Performed by: INTERNAL MEDICINE

## 2019-11-25 PROCEDURE — 40000277 XR SURGERY CARM FLUORO LESS THAN 5 MIN W STILLS: Mod: TC

## 2019-11-25 PROCEDURE — 25800030 ZZH RX IP 258 OP 636: Performed by: STUDENT IN AN ORGANIZED HEALTH CARE EDUCATION/TRAINING PROGRAM

## 2019-11-25 PROCEDURE — 82962 GLUCOSE BLOOD TEST: CPT

## 2019-11-25 PROCEDURE — 82150 ASSAY OF AMYLASE: CPT | Performed by: INTERNAL MEDICINE

## 2019-11-25 PROCEDURE — 27210794 ZZH OR GENERAL SUPPLY STERILE: Performed by: INTERNAL MEDICINE

## 2019-11-25 DEVICE — STENT JOHLIN PANCREA WEDGE 10FRX22CM W/INTRO G26828
Type: IMPLANTABLE DEVICE | Site: BILE DUCT | Status: NON-FUNCTIONAL
Removed: 2020-02-24

## 2019-11-25 DEVICE — STENT JOHLIN PANCREA WEDGE 10FRX20CM W/INTRO G26827
Type: IMPLANTABLE DEVICE | Site: BILE DUCT | Status: NON-FUNCTIONAL
Removed: 2020-02-24

## 2019-11-25 RX ORDER — FENTANYL CITRATE 50 UG/ML
25-50 INJECTION, SOLUTION INTRAMUSCULAR; INTRAVENOUS
Status: DISCONTINUED | OUTPATIENT
Start: 2019-11-25 | End: 2019-11-25 | Stop reason: HOSPADM

## 2019-11-25 RX ORDER — DEXAMETHASONE SODIUM PHOSPHATE 4 MG/ML
INJECTION, SOLUTION INTRA-ARTICULAR; INTRALESIONAL; INTRAMUSCULAR; INTRAVENOUS; SOFT TISSUE PRN
Status: DISCONTINUED | OUTPATIENT
Start: 2019-11-25 | End: 2019-11-25

## 2019-11-25 RX ORDER — ONDANSETRON 2 MG/ML
4 INJECTION INTRAMUSCULAR; INTRAVENOUS EVERY 30 MIN PRN
Status: DISCONTINUED | OUTPATIENT
Start: 2019-11-25 | End: 2019-11-25 | Stop reason: HOSPADM

## 2019-11-25 RX ORDER — LIDOCAINE 40 MG/G
CREAM TOPICAL
Status: DISCONTINUED | OUTPATIENT
Start: 2019-11-25 | End: 2019-11-25 | Stop reason: HOSPADM

## 2019-11-25 RX ORDER — HYDROMORPHONE HYDROCHLORIDE 1 MG/ML
.3-.5 INJECTION, SOLUTION INTRAMUSCULAR; INTRAVENOUS; SUBCUTANEOUS EVERY 10 MIN PRN
Status: DISCONTINUED | OUTPATIENT
Start: 2019-11-25 | End: 2019-11-25 | Stop reason: HOSPADM

## 2019-11-25 RX ORDER — EPHEDRINE SULFATE 50 MG/ML
INJECTION, SOLUTION INTRAMUSCULAR; INTRAVENOUS; SUBCUTANEOUS PRN
Status: DISCONTINUED | OUTPATIENT
Start: 2019-11-25 | End: 2019-11-25

## 2019-11-25 RX ORDER — NALOXONE HYDROCHLORIDE 0.4 MG/ML
.1-.4 INJECTION, SOLUTION INTRAMUSCULAR; INTRAVENOUS; SUBCUTANEOUS
Status: DISCONTINUED | OUTPATIENT
Start: 2019-11-25 | End: 2019-11-25 | Stop reason: HOSPADM

## 2019-11-25 RX ORDER — ONDANSETRON 4 MG/1
4 TABLET, ORALLY DISINTEGRATING ORAL EVERY 30 MIN PRN
Status: DISCONTINUED | OUTPATIENT
Start: 2019-11-25 | End: 2019-11-25 | Stop reason: HOSPADM

## 2019-11-25 RX ORDER — INDOMETHACIN 50 MG/1
100 SUPPOSITORY RECTAL
Status: DISCONTINUED | OUTPATIENT
Start: 2019-11-25 | End: 2019-11-25 | Stop reason: HOSPADM

## 2019-11-25 RX ORDER — PROPOFOL 10 MG/ML
INJECTION, EMULSION INTRAVENOUS PRN
Status: DISCONTINUED | OUTPATIENT
Start: 2019-11-25 | End: 2019-11-25

## 2019-11-25 RX ORDER — IOPAMIDOL 510 MG/ML
INJECTION, SOLUTION INTRAVASCULAR PRN
Status: DISCONTINUED | OUTPATIENT
Start: 2019-11-25 | End: 2019-11-25 | Stop reason: HOSPADM

## 2019-11-25 RX ORDER — ONDANSETRON 2 MG/ML
INJECTION INTRAMUSCULAR; INTRAVENOUS PRN
Status: DISCONTINUED | OUTPATIENT
Start: 2019-11-25 | End: 2019-11-25

## 2019-11-25 RX ORDER — LABETALOL 20 MG/4 ML (5 MG/ML) INTRAVENOUS SYRINGE
10
Status: DISCONTINUED | OUTPATIENT
Start: 2019-11-25 | End: 2019-11-25 | Stop reason: HOSPADM

## 2019-11-25 RX ORDER — CIPROFLOXACIN 500 MG/1
500 TABLET, FILM COATED ORAL 2 TIMES DAILY
Qty: 6 TABLET | Refills: 0 | Status: SHIPPED | OUTPATIENT
Start: 2019-11-25 | End: 2020-02-20

## 2019-11-25 RX ORDER — SODIUM CHLORIDE, SODIUM LACTATE, POTASSIUM CHLORIDE, CALCIUM CHLORIDE 600; 310; 30; 20 MG/100ML; MG/100ML; MG/100ML; MG/100ML
INJECTION, SOLUTION INTRAVENOUS CONTINUOUS
Status: DISCONTINUED | OUTPATIENT
Start: 2019-11-25 | End: 2019-11-25 | Stop reason: HOSPADM

## 2019-11-25 RX ORDER — SODIUM CHLORIDE, SODIUM LACTATE, POTASSIUM CHLORIDE, CALCIUM CHLORIDE 600; 310; 30; 20 MG/100ML; MG/100ML; MG/100ML; MG/100ML
INJECTION, SOLUTION INTRAVENOUS CONTINUOUS PRN
Status: DISCONTINUED | OUTPATIENT
Start: 2019-11-25 | End: 2019-11-25

## 2019-11-25 RX ORDER — FENTANYL CITRATE 50 UG/ML
INJECTION, SOLUTION INTRAMUSCULAR; INTRAVENOUS PRN
Status: DISCONTINUED | OUTPATIENT
Start: 2019-11-25 | End: 2019-11-25

## 2019-11-25 RX ORDER — LIDOCAINE HYDROCHLORIDE 20 MG/ML
INJECTION, SOLUTION INFILTRATION; PERINEURAL PRN
Status: DISCONTINUED | OUTPATIENT
Start: 2019-11-25 | End: 2019-11-25

## 2019-11-25 RX ORDER — FENTANYL CITRATE 50 UG/ML
25-50 INJECTION, SOLUTION INTRAMUSCULAR; INTRAVENOUS EVERY 5 MIN PRN
Status: DISCONTINUED | OUTPATIENT
Start: 2019-11-25 | End: 2019-11-25 | Stop reason: HOSPADM

## 2019-11-25 RX ORDER — MEPERIDINE HYDROCHLORIDE 25 MG/ML
12.5 INJECTION INTRAMUSCULAR; INTRAVENOUS; SUBCUTANEOUS
Status: DISCONTINUED | OUTPATIENT
Start: 2019-11-25 | End: 2019-11-25 | Stop reason: HOSPADM

## 2019-11-25 RX ORDER — CIPROFLOXACIN 2 MG/ML
INJECTION, SOLUTION INTRAVENOUS PRN
Status: DISCONTINUED | OUTPATIENT
Start: 2019-11-25 | End: 2019-11-25

## 2019-11-25 RX ADMIN — Medication 5 MG: at 10:06

## 2019-11-25 RX ADMIN — ROCURONIUM BROMIDE 40 MG: 10 INJECTION INTRAVENOUS at 09:43

## 2019-11-25 RX ADMIN — SUGAMMADEX 150 MG: 100 INJECTION, SOLUTION INTRAVENOUS at 10:32

## 2019-11-25 RX ADMIN — DEXAMETHASONE SODIUM PHOSPHATE 6 MG: 4 INJECTION, SOLUTION INTRA-ARTICULAR; INTRALESIONAL; INTRAMUSCULAR; INTRAVENOUS; SOFT TISSUE at 10:01

## 2019-11-25 RX ADMIN — PROPOFOL 100 MG: 10 INJECTION, EMULSION INTRAVENOUS at 09:43

## 2019-11-25 RX ADMIN — CIPROFLOXACIN 400 MG: 2 INJECTION INTRAVENOUS at 10:12

## 2019-11-25 RX ADMIN — SODIUM CHLORIDE, POTASSIUM CHLORIDE, SODIUM LACTATE AND CALCIUM CHLORIDE: 600; 310; 30; 20 INJECTION, SOLUTION INTRAVENOUS at 09:42

## 2019-11-25 RX ADMIN — LIDOCAINE HYDROCHLORIDE 80 MG: 20 INJECTION, SOLUTION INFILTRATION; PERINEURAL at 09:43

## 2019-11-25 RX ADMIN — ONDANSETRON 4 MG: 2 INJECTION INTRAMUSCULAR; INTRAVENOUS at 10:31

## 2019-11-25 RX ADMIN — FENTANYL CITRATE 50 MCG: 50 INJECTION, SOLUTION INTRAMUSCULAR; INTRAVENOUS at 09:43

## 2019-11-25 ASSESSMENT — MIFFLIN-ST. JEOR: SCORE: 1243.75

## 2019-11-25 NOTE — ANESTHESIA CARE TRANSFER NOTE
Patient: Tim Cazares    Procedure(s):  Endoscopic Retrograde Cholangiopancreatogram with biliary stent exchange    Diagnosis: Cholangiocarcinoma  Diagnosis Additional Information: No value filed.    Anesthesia Type:   General     Note:  Airway :Nasal Cannula  Patient transferred to:PACU  Comments: VSS. Breathing spontaneously at a regular rate with adequate tidal volumes and maintaining O2 sats on 2L. Denies nausea or pain. No apparent complications from anesthesia.     Whitney Souza MD [facility] [place] pager [phone]  Anesthesia CA-1  Handoff Report: Identifed the Patient, Identified the Reponsible Provider, Reviewed the pertinent medical history, Discussed the surgical course, Reviewed Intra-OP anesthesia mangement and issues during anesthesia, Set expectations for post-procedure period and Allowed opportunity for questions and acknowledgement of understanding      Vitals: (Last set prior to Anesthesia Care Transfer)    CRNA VITALS  11/25/2019 1013 - 11/25/2019 1052      11/25/2019             Pulse:  66    SpO2:  100 %                Electronically Signed By: Whitney Souza MD  November 25, 2019  10:52 AM

## 2019-11-25 NOTE — DISCHARGE INSTRUCTIONS
[facility]  Same-Day Surgery   Adult Discharge Orders & Instructions     For 24 hours after surgery    1. Get plenty of rest.  A responsible adult must stay with you for at least 24 hours after you leave the hospital.   2. Do not drive or use heavy equipment.  If you have weakness or tingling, don't drive or use heavy equipment until this feeling goes away.  3. Do not drink alcohol.  4. Avoid strenuous or risky activities.  Ask for help when climbing stairs.   5. You may feel lightheaded.  IF so, sit for a few minutes before standing.  Have someone help you get up.   6. If you have nausea (feel sick to your stomach): Drink only clear liquids such as apple juice, ginger ale, broth or 7-Up.  Rest may also help.  Be sure to drink enough fluids.  Move to a regular diet as you feel able.  7. You may have a slight fever. Call the doctor if your fever is over 100 F (37.7 C) (taken under the tongue) or lasts longer than 24 hours.  8. You may have a dry mouth, a sore throat, muscle aches or trouble sleeping.  These should go away after 24 hours.  9. Do not make important or legal decisions.   Call your doctor for any of the followin.  Signs of infection (fever, growing tenderness at the surgery site, a large amount of drainage or bleeding, severe pain, foul-smelling drainage, redness, swelling).    2. It has been over 8 to 10 hours since surgery and you are still not able to urinate (pass water).    3.  Headache for over 24 hours.      To contact a doctor, call Dr Barraza at [phone] (clinic) or:    x   [phone] and ask for the resident on call for   Gastroenterology (answered 24 hours a day)  x   Emergency Department:    [facility]: [phone]       (TTY for hearing impaired: [phone])      Post-op Recommendations:                         - Repeat ERCP (endoscopic retrograde cholangiopancreatography)  in 12 weeks for repeat stent exchange                         - Complete the short course of antibiotic as prescribed

## 2019-11-25 NOTE — OP NOTE
ERCP 11/25/2019  9:34 AM [facility], [address] [place] [address] [phone]     Endoscopy Department   _______________________________________________________________________________   Patient Name: Tim Cazares         Procedure Date: 11/25/2019 9:34 AM   MRN: [id number]                       Account Number: [id number]   YOB: 1935               Admit Type: Outpatient   Age: 84                               Room: OR   Gender: Male                          Note Status: Finalized   Attending MD: Bravo Barraza MD  Pause for the Cause: pause for cause    completed   Total Sedation Time:                     _______________________________________________________________________________       Procedure:           ERCP   Indications:         Cholangiocarcinoma, Stent change, Bile duct stricture   Providers:           Bravo Barraza MD   Patient Profile:     Mr Cazares is an 83yo gentleman with biopsy proven                        cholangiocarcinoma whose chemotherapy has been paused                        indefinitely and returns on schedule for exchange of the                        biliary stents. After a long discussion with the family,                        we decided to defer exchange to metal stents and proceed                        to ERCP for exchange of plastic stents.   Referring MD:        Jesus Alberto Nova MD   Medicines:           General Anesthesia, Indomethacin not given due to                        contraindication, Cipro 400 mg IV   Complications:       No immediate complications.   _______________________________________________________________________________   Procedure:           Pre-Anesthesia Assessment:                        - Prior to the procedure, a History and Physical was                        performed, and patient medications and allergies were                        reviewed. The patient is competent. The risks and                         benefits of the procedure and the sedation options and                        risks were discussed with the patient. All questions                        were answered and informed consent was obtained. Patient                        identification and proposed procedure were verified by                        the nurse in the pre-procedure area. Mental Status                        Examination: alert and oriented. Airway Examination:                        Mallampati Class II (the uvula but not tonsillar pillars                        visualized). Respiratory Examination: clear to                        auscultation. CV Examination: systolic murmur. ASA Grade                        Assessment: III - A patient with severe systemic                        disease. After reviewing the risks and benefits, the                        patient was deemed in satisfactory condition to undergo                        the procedure. The anesthesia plan was to use general                        anesthesia. Immediately prior to administration of                        medications, the patient was re-assessed for adequacy to                        receive sedatives. The heart rate, respiratory rate,                        oxygen saturations, blood pressure, adequacy of                        pulmonary ventilation, and response to care were                        monitored throughout the procedure. The physical status                        of the patient was re-assessed after the procedure.                        After obtaining informed consent, the scope was passed                        under direct vision. Throughout the procedure, the                        patient's blood pressure, pulse, and oxygen saturations                        were monitored continuously. The duodensocope was                        introduced through the mouth, and used to inject                        contrast into and used to inject  "contrast into the bile                        duct. The ERCP was accomplished without difficulty. The                        patient tolerated the procedure well.                                                                                     Findings:        A  film demonstrated stable position of the two biliary stents.        Limited white light views of the foregut were notable for partial to        full occlusion of the biliary stents which were appropriately        internalized across a patent biliary sphincterotomy. The two stents were        removed from the biliary tree using a snare. The bile duct was deeply        cannulated with the short-nosed traction sphincterotome in concert with        multiple 0.025\" Visiglide wires. Contrast was injected and I personally        interpreted the bile duct images. Ductal flow of contrast was adequate,        image quality was excellent and contrast extended throughout the        intrahepatics. As previous there were malignant strictures of the        bifurcation, involving the three primaries now without peripheral        dilation of the intrahepatics. The common below the bifuraction was        unremarkable and there were no overt filling defect. The biliary tree        was swept with a 12 mm occusion balloon starting at the left        intrahepatic duct(s) and right intrahepatic duct(s). Debris (stone        concretion) was swept from the duct. A 10 Fr by 22 cm transpapillary        Johlin stent with no external flaps and no internal flaps was placed 20        cm into the left intrahepatics. A 10 Fr by 18 cm transpapillary Johlin        stent with no external flaps and no internal flaps was placed 17 cm into        the right posterior intrahepatics. Bile flowed through the stents and        the stent were in good position. The ventral pancreatic duct and orifice        were selectively not interrogated during this procedure.                                 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                        involving the bifurcation and each primary insertion)                        now without signficant upstream dilation                        - Successful removal of stone concretion (stent related)                        - Successful placement of two 10F Johlin stents deep to                        the right and left intrahepatics   Recommendation:      - General anesthesia recovery with probable discharge                        home this afternoon                        - Repeat ERCP in 12 weeks for repeat stent exchange                        - Complete the short course of antibiotic as prescribed                        - The findings and recommendations were discussed with                        the patient and their family.                                                                                       electronically signed by DAISY Barraza   _____________________   Bravo Barraza MD   11/25/2019 10:46:20 AM   I was physically present for the entire viewing portion of the exam.   __________________________   Signature of teaching physician   Rocio/Pam Barraza MD   Number of Addenda: 0

## 2019-11-25 NOTE — ANESTHESIA POSTPROCEDURE EVALUATION
Anesthesia POST Procedure Evaluation    Patient: Tim Cazares   MRN:     [id number] Gender:   male   Age:    84 year old :      1935        Preoperative Diagnosis: Cholangiocarcinoma   Procedure(s):  Endoscopic Retrograde Cholangiopancreatogram with biliary stent exchange   Postop Comments: No value filed.       Anesthesia Type:  Not documented  General    Reportable Event: NO     PAIN: Uncomplicated   Sign Out status: Comfortable, Well controlled pain     PONV: No PONV   Sign Out status:  No Nausea or Vomiting     Neuro/Psych: Uneventful perioperative course   Sign Out Status: Preoperative baseline; Age appropriate mentation     Airway/Resp.: Uneventful perioperative course   Sign Out Status: Non labored breathing, age appropriate RR; Resp. Status within EXPECTED Parameters     CV: Uneventful perioperative course   Sign Out status: Appropriate BP and perfusion indices; Appropriate HR/Rhythm     Disposition:   Sign Out in:  PACU  Disposition:  Phase II; Home  Recovery Course: Uneventful  Follow-Up: Not required           Last Anesthesia Record Vitals:  CRNA VITALS  2019 1013 - 2019 1113      2019             Pulse:  66    SpO2:  100 %          Last PACU Vitals:  Vitals Value Taken Time   /67 2019 11:30 AM   Temp 35.6  C (96  F) 2019 11:30 AM   Pulse 58 2019 11:30 AM   Resp 14 2019 11:30 AM   SpO2 100 % 2019 11:30 AM   Temp src     NIBP     Pulse     SpO2     Resp     Temp     Ht Rate     Temp 2           Electronically Signed By: Lakhwinder Pang MD, 2019, 12:48 PM

## 2019-11-26 LAB — INTERPRETATION ECG - MUSE: NORMAL

## 2019-11-27 ENCOUNTER — PREP FOR PROCEDURE (OUTPATIENT)
Dept: GASTROENTEROLOGY | Facility: CLINIC | Age: 84
End: 2019-11-27

## 2019-11-27 ENCOUNTER — PATIENT OUTREACH (OUTPATIENT)
Dept: GASTROENTEROLOGY | Facility: CLINIC | Age: 84
End: 2019-11-27

## 2019-11-27 DIAGNOSIS — Z46.89 ENCOUNTER FOR REPLACEMENT OF BILIARY STENT: Primary | ICD-10-CM

## 2019-11-27 NOTE — PROGRESS NOTES
Patient called stating follow up in 12 weeks. They leave for [place] shortly, looking to arrange follow up on 2/17    Per Dr. Barraza  Repeat ERCP in 12 weeks for repeat stent exchange    - Complete the short course of antibiotic as prescribed     Dr. Barraza on vacation on 2/17, left voicemail.     Case request placed.     Valencia Tran, RN Care Coordinator

## 2019-12-03 ENCOUNTER — TELEPHONE (OUTPATIENT)
Dept: GASTROENTEROLOGY | Facility: CLINIC | Age: 84
End: 2019-12-03

## 2019-12-03 PROBLEM — Z46.89 ENCOUNTER FOR REPLACEMENT OF BILIARY STENT: Status: ACTIVE | Noted: 2019-12-03

## 2019-12-03 NOTE — TELEPHONE ENCOUNTER
Spoke to patient's wife in regards to scheduled procedure. Informed her the patient is scheduled with Dr. Barraza on 2/24/2020. Informed her the patient will need an updated pre-op physical within 30 days of his procedure. She stated the patient is going to have this done locally. Informed her the will need a  and someone to monitor him for 24 hours after the procedure. Informed her all scheduling details will be sent to the address listed on Epic. Address confirmed on this call.     12/3/19 1236pm

## 2020-02-10 ENCOUNTER — PATIENT OUTREACH (OUTPATIENT)
Dept: GASTROENTEROLOGY | Facility: CLINIC | Age: 85
End: 2020-02-10

## 2020-02-10 ENCOUNTER — CARE COORDINATION (OUTPATIENT)
Dept: GASTROENTEROLOGY | Facility: CLINIC | Age: 85
End: 2020-02-10

## 2020-02-10 NOTE — PROGRESS NOTES
Patients wife called r/t concern for symptoms and scheduled ERCP on 2/24. They're in [facility], started having night sweats, nausea.    Had preop today, no fever noted, advised to get a thermometer and take temp during next fever.     Has nausea now, comes and goes. Is normally cautious of diet.     Advised to go to local ER with any new fever, pain or persistent nausea.     Valencia Tran RN Care Coordinator

## 2020-02-10 NOTE — PROGRESS NOTES
Pts wife left message stating that pt is having symptoms. Valencia Tran, RNCC has reached out to discuss. See note.     MELY [address]ru[address] Dr[address] Dr. Barraza, & Dr. Agosto  [facility]  [phone]

## 2020-02-20 ENCOUNTER — PATIENT OUTREACH (OUTPATIENT)
Dept: GASTROENTEROLOGY | Facility: CLINIC | Age: 85
End: 2020-02-20

## 2020-02-20 NOTE — OR NURSING
"Fevers   Received: Today   Message Contents   Raysa Stover RN [place], Megan, RN Hi Megan,     Pre-op call done with pt's wife. She said she spoke with you regarding pt's fevers and she has been taking his temp. She said you advised her to go to the ER but she has not done that because \"The fever comes and then it goes away\". His temp was 101.2 on 2/18 but no fever today\".  They are planning to fly here from [place] on Sunday.     Please follow up with her.     Thanks.     Raysa Stover RN, BSN   Preadmission Nursing           "

## 2020-02-20 NOTE — PROGRESS NOTES
"Per DIAS, patients wife reporting fevers coming and going. Has not gone to ER as recommended because they \"go away\".     Last fever was on Tuesday, since then has been ok. no respiratory symptoms.  Advised to report as schedule for procedure.    Valencia Tran RN Care Coordinator     "

## 2020-02-24 ENCOUNTER — APPOINTMENT (OUTPATIENT)
Dept: GENERAL RADIOLOGY | Facility: CLINIC | Age: 85
DRG: 862 | End: 2020-02-24
Attending: INTERNAL MEDICINE
Payer: MEDICARE

## 2020-02-24 ENCOUNTER — HOSPITAL ENCOUNTER (INPATIENT)
Facility: CLINIC | Age: 85
LOS: 4 days | Discharge: HOME | DRG: 862 | End: 2020-02-29
Attending: EMERGENCY MEDICINE | Admitting: INTERNAL MEDICINE
Payer: MEDICARE

## 2020-02-24 ENCOUNTER — ANESTHESIA (OUTPATIENT)
Dept: SURGERY | Facility: CLINIC | Age: 85
DRG: 862 | End: 2020-02-24
Payer: MEDICARE

## 2020-02-24 ENCOUNTER — HOSPITAL ENCOUNTER (OUTPATIENT)
Facility: CLINIC | Age: 85
Discharge: HOME | DRG: 862 | End: 2020-02-24
Attending: INTERNAL MEDICINE | Admitting: INTERNAL MEDICINE
Payer: MEDICARE

## 2020-02-24 ENCOUNTER — ANESTHESIA EVENT (OUTPATIENT)
Dept: SURGERY | Facility: CLINIC | Age: 85
DRG: 862 | End: 2020-02-24
Payer: MEDICARE

## 2020-02-24 VITALS
HEIGHT: 66 IN | WEIGHT: 129.85 LBS | OXYGEN SATURATION: 100 % | RESPIRATION RATE: 14 BRPM | SYSTOLIC BLOOD PRESSURE: 130 MMHG | TEMPERATURE: 97.7 F | BODY MASS INDEX: 20.87 KG/M2 | DIASTOLIC BLOOD PRESSURE: 77 MMHG | HEART RATE: 58 BPM

## 2020-02-24 DIAGNOSIS — C22.1 MALIGNANT NEOPLASM OF INTRAHEPATIC BILE DUCTS (H): ICD-10-CM

## 2020-02-24 DIAGNOSIS — Z46.89 ENCOUNTER FOR REPLACEMENT OF BILIARY STENT: ICD-10-CM

## 2020-02-24 DIAGNOSIS — R11.2 NAUSEA AND VOMITING, INTRACTABILITY OF VOMITING NOT SPECIFIED, UNSPECIFIED VOMITING TYPE: ICD-10-CM

## 2020-02-24 DIAGNOSIS — K83.09 ASCENDING CHOLANGITIS (H): Primary | ICD-10-CM

## 2020-02-24 DIAGNOSIS — C24.0 MALIGNANT NEOPLASM OF BILIARY DUCT OR PASSAGE (H): ICD-10-CM

## 2020-02-24 DIAGNOSIS — R74.01 TRANSAMINITIS: ICD-10-CM

## 2020-02-24 DIAGNOSIS — K83.1 BILE DUCT STRICTURE (H): Primary | ICD-10-CM

## 2020-02-24 DIAGNOSIS — R79.89 ELEVATED LIVER FUNCTION TESTS: ICD-10-CM

## 2020-02-24 LAB
ALBUMIN SERPL-MCNC: 2.9 G/DL (ref 3.4–5)
ALP SERPL-CCNC: 985 U/L (ref 40–150)
ALT SERPL W P-5'-P-CCNC: 188 U/L (ref 0–70)
AMYLASE SERPL-CCNC: 53 U/L (ref 30–110)
ANION GAP SERPL CALCULATED.3IONS-SCNC: 3 MMOL/L (ref 3–14)
ANION GAP SERPL CALCULATED.3IONS-SCNC: 6 MMOL/L (ref 3–14)
AST SERPL W P-5'-P-CCNC: 187 U/L (ref 0–45)
BASOPHILS # BLD AUTO: 0 10E9/L (ref 0–0.2)
BASOPHILS NFR BLD AUTO: 0.2 %
BILIRUB SERPL-MCNC: 5.2 MG/DL (ref 0.2–1.3)
BUN SERPL-MCNC: 14 MG/DL (ref 7–30)
BUN SERPL-MCNC: 14 MG/DL (ref 7–30)
CALCIUM SERPL-MCNC: 9.2 MG/DL (ref 8.5–10.1)
CALCIUM SERPL-MCNC: 9.5 MG/DL (ref 8.5–10.1)
CHLORIDE SERPL-SCNC: 104 MMOL/L (ref 94–109)
CHLORIDE SERPL-SCNC: 99 MMOL/L (ref 94–109)
CO2 SERPL-SCNC: 25 MMOL/L (ref 20–32)
CO2 SERPL-SCNC: 31 MMOL/L (ref 20–32)
CREAT SERPL-MCNC: 0.89 MG/DL (ref 0.66–1.25)
CREAT SERPL-MCNC: 1.02 MG/DL (ref 0.66–1.25)
DIFFERENTIAL METHOD BLD: ABNORMAL
EOSINOPHIL # BLD AUTO: 0.1 10E9/L (ref 0–0.7)
EOSINOPHIL NFR BLD AUTO: 0.6 %
ERCP: NORMAL
ERYTHROCYTE [DISTWIDTH] IN BLOOD BY AUTOMATED COUNT: 14 % (ref 10–15)
ERYTHROCYTE [DISTWIDTH] IN BLOOD BY AUTOMATED COUNT: 14.3 % (ref 10–15)
GFR SERPL CREATININE-BSD FRML MDRD: 68 ML/MIN/{1.73_M2}
GFR SERPL CREATININE-BSD FRML MDRD: 79 ML/MIN/{1.73_M2}
GLUCOSE BLDC GLUCOMTR-MCNC: 93 MG/DL (ref 70–99)
GLUCOSE SERPL-MCNC: 102 MG/DL (ref 70–99)
GLUCOSE SERPL-MCNC: 132 MG/DL (ref 70–99)
HCT VFR BLD AUTO: 36 % (ref 40–53)
HCT VFR BLD AUTO: 37.2 % (ref 40–53)
HGB BLD-MCNC: 11.6 G/DL (ref 13.3–17.7)
HGB BLD-MCNC: 12.3 G/DL (ref 13.3–17.7)
IMM GRANULOCYTES # BLD: 0.1 10E9/L (ref 0–0.4)
IMM GRANULOCYTES NFR BLD: 0.5 %
LIPASE SERPL-CCNC: 77 U/L (ref 73–393)
LYMPHOCYTES # BLD AUTO: 0.8 10E9/L (ref 0.8–5.3)
LYMPHOCYTES NFR BLD AUTO: 7.4 %
MCH RBC QN AUTO: 33 PG (ref 26.5–33)
MCH RBC QN AUTO: 33.2 PG (ref 26.5–33)
MCHC RBC AUTO-ENTMCNC: 32.2 G/DL (ref 31.5–36.5)
MCHC RBC AUTO-ENTMCNC: 33.1 G/DL (ref 31.5–36.5)
MCV RBC AUTO: 101 FL (ref 78–100)
MCV RBC AUTO: 102 FL (ref 78–100)
MONOCYTES # BLD AUTO: 1 10E9/L (ref 0–1.3)
MONOCYTES NFR BLD AUTO: 8.9 %
NEUTROPHILS # BLD AUTO: 9.2 10E9/L (ref 1.6–8.3)
NEUTROPHILS NFR BLD AUTO: 82.4 %
NRBC # BLD AUTO: 0 10*3/UL
NRBC BLD AUTO-RTO: 0 /100
PLATELET # BLD AUTO: 298 10E9/L (ref 150–450)
PLATELET # BLD AUTO: 325 10E9/L (ref 150–450)
POTASSIUM SERPL-SCNC: 3.6 MMOL/L (ref 3.4–5.3)
POTASSIUM SERPL-SCNC: 3.7 MMOL/L (ref 3.4–5.3)
PROT SERPL-MCNC: 7.2 G/DL (ref 6.8–8.8)
RBC # BLD AUTO: 3.52 10E12/L (ref 4.4–5.9)
RBC # BLD AUTO: 3.7 10E12/L (ref 4.4–5.9)
SODIUM SERPL-SCNC: 132 MMOL/L (ref 133–144)
SODIUM SERPL-SCNC: 135 MMOL/L (ref 133–144)
WBC # BLD AUTO: 11.1 10E9/L (ref 4–11)
WBC # BLD AUTO: 8.9 10E9/L (ref 4–11)

## 2020-02-24 PROCEDURE — 0FPB8DZ REMOVAL OF INTRALUMINAL DEVICE FROM HEPATOBILIARY DUCT, VIA NATURAL OR ARTIFICIAL OPENING ENDOSCOPIC: ICD-10-PCS | Performed by: INTERNAL MEDICINE

## 2020-02-24 PROCEDURE — 25000128 H RX IP 250 OP 636: Performed by: DENTIST

## 2020-02-24 PROCEDURE — 82150 ASSAY OF AMYLASE: CPT | Performed by: INTERNAL MEDICINE

## 2020-02-24 PROCEDURE — 82962 GLUCOSE BLOOD TEST: CPT

## 2020-02-24 PROCEDURE — 71000027 ZZH RECOVERY PHASE 2 EACH 15 MINS: Performed by: INTERNAL MEDICINE

## 2020-02-24 PROCEDURE — 25000132 ZZH RX MED GY IP 250 OP 250 PS 637: Mod: GY | Performed by: EMERGENCY MEDICINE

## 2020-02-24 PROCEDURE — 0FC98ZZ EXTIRPATION OF MATTER FROM COMMON BILE DUCT, VIA NATURAL OR ARTIFICIAL OPENING ENDOSCOPIC: ICD-10-PCS | Performed by: INTERNAL MEDICINE

## 2020-02-24 PROCEDURE — 80048 BASIC METABOLIC PNL TOTAL CA: CPT | Performed by: INTERNAL MEDICINE

## 2020-02-24 PROCEDURE — 0F768DZ DILATION OF LEFT HEPATIC DUCT WITH INTRALUMINAL DEVICE, VIA NATURAL OR ARTIFICIAL OPENING ENDOSCOPIC: ICD-10-PCS | Performed by: INTERNAL MEDICINE

## 2020-02-24 PROCEDURE — 36000055 ZZH SURGERY LEVEL 2 W FLUORO 1ST 30 MIN - UMMC: Performed by: INTERNAL MEDICINE

## 2020-02-24 PROCEDURE — C1726 CATH, BAL DIL, NON-VASCULAR: HCPCS | Performed by: INTERNAL MEDICINE

## 2020-02-24 PROCEDURE — 25000125 ZZHC RX 250: Performed by: DENTIST

## 2020-02-24 PROCEDURE — 25000128 H RX IP 250 OP 636: Performed by: EMERGENCY MEDICINE

## 2020-02-24 PROCEDURE — 40000279 XR SURGERY CARM FLUORO GREATER THAN 5 MIN W STILLS: Mod: TC

## 2020-02-24 PROCEDURE — 25000566 ZZH SEVOFLURANE, EA 15 MIN: Performed by: INTERNAL MEDICINE

## 2020-02-24 PROCEDURE — 40000170 ZZH STATISTIC PRE-PROCEDURE ASSESSMENT II: Performed by: INTERNAL MEDICINE

## 2020-02-24 PROCEDURE — 25000125 ZZHC RX 250: Performed by: INTERNAL MEDICINE

## 2020-02-24 PROCEDURE — 37000008 ZZH ANESTHESIA TECHNICAL FEE, 1ST 30 MIN: Performed by: INTERNAL MEDICINE

## 2020-02-24 PROCEDURE — C1769 GUIDE WIRE: HCPCS | Performed by: INTERNAL MEDICINE

## 2020-02-24 PROCEDURE — 99285 EMERGENCY DEPT VISIT HI MDM: CPT | Mod: Z6 | Performed by: EMERGENCY MEDICINE

## 2020-02-24 PROCEDURE — 96374 THER/PROPH/DIAG INJ IV PUSH: CPT | Performed by: EMERGENCY MEDICINE

## 2020-02-24 PROCEDURE — 83690 ASSAY OF LIPASE: CPT | Performed by: INTERNAL MEDICINE

## 2020-02-24 PROCEDURE — C1876 STENT, NON-COA/NON-COV W/DEL: HCPCS | Performed by: INTERNAL MEDICINE

## 2020-02-24 PROCEDURE — 80053 COMPREHEN METABOLIC PANEL: CPT | Performed by: EMERGENCY MEDICINE

## 2020-02-24 PROCEDURE — 85025 COMPLETE CBC W/AUTO DIFF WBC: CPT | Performed by: EMERGENCY MEDICINE

## 2020-02-24 PROCEDURE — 85027 COMPLETE CBC AUTOMATED: CPT | Performed by: INTERNAL MEDICINE

## 2020-02-24 PROCEDURE — 27210794 ZZH OR GENERAL SUPPLY STERILE: Performed by: INTERNAL MEDICINE

## 2020-02-24 PROCEDURE — 25500064 ZZH RX 255 OP 636: Performed by: INTERNAL MEDICINE

## 2020-02-24 PROCEDURE — 0F798DZ DILATION OF COMMON BILE DUCT WITH INTRALUMINAL DEVICE, VIA NATURAL OR ARTIFICIAL OPENING ENDOSCOPIC: ICD-10-PCS | Performed by: INTERNAL MEDICINE

## 2020-02-24 PROCEDURE — 36000053 ZZH SURGERY LEVEL 2 EA 15 ADDTL MIN - UMMC: Performed by: INTERNAL MEDICINE

## 2020-02-24 PROCEDURE — 37000009 ZZH ANESTHESIA TECHNICAL FEE, EACH ADDTL 15 MIN: Performed by: INTERNAL MEDICINE

## 2020-02-24 PROCEDURE — 99285 EMERGENCY DEPT VISIT HI MDM: CPT | Mod: 25 | Performed by: EMERGENCY MEDICINE

## 2020-02-24 PROCEDURE — 25800030 ZZH RX IP 258 OP 636: Performed by: NURSE ANESTHETIST, CERTIFIED REGISTERED

## 2020-02-24 PROCEDURE — 71000014 ZZH RECOVERY PHASE 1 LEVEL 2 FIRST HR: Performed by: INTERNAL MEDICINE

## 2020-02-24 PROCEDURE — 36415 COLL VENOUS BLD VENIPUNCTURE: CPT | Performed by: INTERNAL MEDICINE

## 2020-02-24 DEVICE — STENT JOHLIN PANCREA WEDGE 10FRX20CM W/INTRO G26827
Type: IMPLANTABLE DEVICE | Site: BILE DUCT | Status: NON-FUNCTIONAL
Removed: 2020-05-12

## 2020-02-24 RX ORDER — ONDANSETRON 2 MG/ML
4 INJECTION INTRAMUSCULAR; INTRAVENOUS ONCE
Status: COMPLETED | OUTPATIENT
Start: 2020-02-24 | End: 2020-02-24

## 2020-02-24 RX ORDER — CIPROFLOXACIN 2 MG/ML
INJECTION, SOLUTION INTRAVENOUS PRN
Status: DISCONTINUED | OUTPATIENT
Start: 2020-02-24 | End: 2020-02-24

## 2020-02-24 RX ORDER — ONDANSETRON 2 MG/ML
4 INJECTION INTRAMUSCULAR; INTRAVENOUS EVERY 30 MIN PRN
Status: DISCONTINUED | OUTPATIENT
Start: 2020-02-24 | End: 2020-02-24 | Stop reason: HOSPADM

## 2020-02-24 RX ORDER — PROPOFOL 10 MG/ML
INJECTION, EMULSION INTRAVENOUS PRN
Status: DISCONTINUED | OUTPATIENT
Start: 2020-02-24 | End: 2020-02-24

## 2020-02-24 RX ORDER — MEPERIDINE HYDROCHLORIDE 25 MG/ML
12.5 INJECTION INTRAMUSCULAR; INTRAVENOUS; SUBCUTANEOUS
Status: DISCONTINUED | OUTPATIENT
Start: 2020-02-24 | End: 2020-02-24 | Stop reason: HOSPADM

## 2020-02-24 RX ORDER — HYDROMORPHONE HYDROCHLORIDE 1 MG/ML
.3-.5 INJECTION, SOLUTION INTRAMUSCULAR; INTRAVENOUS; SUBCUTANEOUS EVERY 10 MIN PRN
Status: DISCONTINUED | OUTPATIENT
Start: 2020-02-24 | End: 2020-02-24 | Stop reason: HOSPADM

## 2020-02-24 RX ORDER — FENTANYL CITRATE 50 UG/ML
INJECTION, SOLUTION INTRAMUSCULAR; INTRAVENOUS PRN
Status: DISCONTINUED | OUTPATIENT
Start: 2020-02-24 | End: 2020-02-24

## 2020-02-24 RX ORDER — INDOMETHACIN 50 MG/1
100 SUPPOSITORY RECTAL
Status: COMPLETED | OUTPATIENT
Start: 2020-02-24 | End: 2020-02-24

## 2020-02-24 RX ORDER — SODIUM CHLORIDE, SODIUM LACTATE, POTASSIUM CHLORIDE, CALCIUM CHLORIDE 600; 310; 30; 20 MG/100ML; MG/100ML; MG/100ML; MG/100ML
INJECTION, SOLUTION INTRAVENOUS CONTINUOUS
Status: DISCONTINUED | OUTPATIENT
Start: 2020-02-24 | End: 2020-02-24 | Stop reason: HOSPADM

## 2020-02-24 RX ORDER — CIPROFLOXACIN 500 MG/1
500 TABLET, FILM COATED ORAL 2 TIMES DAILY
Qty: 10 TABLET | Refills: 0 | Status: ON HOLD | OUTPATIENT
Start: 2020-02-24 | End: 2020-02-28

## 2020-02-24 RX ORDER — LIDOCAINE HYDROCHLORIDE 20 MG/ML
INJECTION, SOLUTION INFILTRATION; PERINEURAL PRN
Status: DISCONTINUED | OUTPATIENT
Start: 2020-02-24 | End: 2020-02-24

## 2020-02-24 RX ORDER — LIDOCAINE 40 MG/G
CREAM TOPICAL
Status: DISCONTINUED | OUTPATIENT
Start: 2020-02-24 | End: 2020-02-24 | Stop reason: HOSPADM

## 2020-02-24 RX ORDER — EPHEDRINE SULFATE 50 MG/ML
INJECTION, SOLUTION INTRAMUSCULAR; INTRAVENOUS; SUBCUTANEOUS PRN
Status: DISCONTINUED | OUTPATIENT
Start: 2020-02-24 | End: 2020-02-24

## 2020-02-24 RX ORDER — IOPAMIDOL 510 MG/ML
INJECTION, SOLUTION INTRAVASCULAR PRN
Status: DISCONTINUED | OUTPATIENT
Start: 2020-02-24 | End: 2020-02-24 | Stop reason: HOSPADM

## 2020-02-24 RX ORDER — SODIUM CHLORIDE, SODIUM LACTATE, POTASSIUM CHLORIDE, CALCIUM CHLORIDE 600; 310; 30; 20 MG/100ML; MG/100ML; MG/100ML; MG/100ML
INJECTION, SOLUTION INTRAVENOUS CONTINUOUS PRN
Status: DISCONTINUED | OUTPATIENT
Start: 2020-02-24 | End: 2020-02-24

## 2020-02-24 RX ORDER — FENTANYL CITRATE 50 UG/ML
25-50 INJECTION, SOLUTION INTRAMUSCULAR; INTRAVENOUS
Status: DISCONTINUED | OUTPATIENT
Start: 2020-02-24 | End: 2020-02-24 | Stop reason: HOSPADM

## 2020-02-24 RX ORDER — ONDANSETRON 4 MG/1
4 TABLET, ORALLY DISINTEGRATING ORAL EVERY 30 MIN PRN
Status: DISCONTINUED | OUTPATIENT
Start: 2020-02-24 | End: 2020-02-24 | Stop reason: HOSPADM

## 2020-02-24 RX ORDER — NALOXONE HYDROCHLORIDE 0.4 MG/ML
.1-.4 INJECTION, SOLUTION INTRAMUSCULAR; INTRAVENOUS; SUBCUTANEOUS
Status: DISCONTINUED | OUTPATIENT
Start: 2020-02-24 | End: 2020-02-24 | Stop reason: HOSPADM

## 2020-02-24 RX ORDER — ONDANSETRON 2 MG/ML
INJECTION INTRAMUSCULAR; INTRAVENOUS PRN
Status: DISCONTINUED | OUTPATIENT
Start: 2020-02-24 | End: 2020-02-24

## 2020-02-24 RX ORDER — CIPROFLOXACIN 500 MG/1
500 TABLET, FILM COATED ORAL 2 TIMES DAILY
Status: DISCONTINUED | OUTPATIENT
Start: 2020-02-24 | End: 2020-02-25

## 2020-02-24 RX ADMIN — CIPROFLOXACIN HYDROCHLORIDE 500 MG: 500 TABLET, FILM COATED ORAL at 23:59

## 2020-02-24 RX ADMIN — ONDANSETRON 4 MG: 2 INJECTION INTRAMUSCULAR; INTRAVENOUS at 22:40

## 2020-02-24 RX ADMIN — CIPROFLOXACIN 400 MG: 2 INJECTION INTRAVENOUS at 07:34

## 2020-02-24 RX ADMIN — FENTANYL CITRATE 100 MCG: 50 INJECTION, SOLUTION INTRAMUSCULAR; INTRAVENOUS at 07:18

## 2020-02-24 RX ADMIN — ONDANSETRON 4 MG: 2 INJECTION INTRAMUSCULAR; INTRAVENOUS at 08:27

## 2020-02-24 RX ADMIN — ROCURONIUM BROMIDE 50 MG: 10 INJECTION INTRAVENOUS at 07:18

## 2020-02-24 RX ADMIN — PROPOFOL 40 MG: 10 INJECTION, EMULSION INTRAVENOUS at 08:17

## 2020-02-24 RX ADMIN — PROPOFOL 150 MG: 10 INJECTION, EMULSION INTRAVENOUS at 07:18

## 2020-02-24 RX ADMIN — LIDOCAINE HYDROCHLORIDE 100 MG: 20 INJECTION, SOLUTION INFILTRATION; PERINEURAL at 07:18

## 2020-02-24 RX ADMIN — SODIUM CHLORIDE, POTASSIUM CHLORIDE, SODIUM LACTATE AND CALCIUM CHLORIDE: 600; 310; 30; 20 INJECTION, SOLUTION INTRAVENOUS at 07:06

## 2020-02-24 RX ADMIN — Medication 10 MG: at 07:37

## 2020-02-24 RX ADMIN — Medication 40 MG: at 07:37

## 2020-02-24 RX ADMIN — SUGAMMADEX 200 MG: 100 INJECTION, SOLUTION INTRAVENOUS at 08:17

## 2020-02-24 ASSESSMENT — ENCOUNTER SYMPTOMS
UNEXPECTED WEIGHT CHANGE: 1
DIARRHEA: 1
VOMITING: 1
DIAPHORESIS: 1
CHILLS: 1
NAUSEA: 1
FEVER: 0

## 2020-02-24 ASSESSMENT — LIFESTYLE VARIABLES: TOBACCO_USE: 1

## 2020-02-24 ASSESSMENT — MIFFLIN-ST. JEOR: SCORE: 1226.75

## 2020-02-24 NOTE — ANESTHESIA PREPROCEDURE EVALUATION
Anesthesia Pre-Procedure Evaluation    Patient: Tim Cazares   MRN:     [id number] Gender:   male   Age:    84 year old :      1935        Preoperative Diagnosis: Cholangiocarcinoma   Procedure(s):  Endoscopic Retrograde Cholangiopancreatogram     Past Medical History:   Diagnosis Date     CKD (chronic kidney disease)      GERD (gastroesophageal reflux disease)      Hypertension       Past Surgical History:   Procedure Laterality Date     CHOLECYSTECTOMY       ENDOSCOPIC RETROGRADE CHOLANGIOPANCREATOGRAM N/A 10/9/2018    Procedure: COMBINED ENDOSCOPIC RETROGRADE CHOLANGIOPANCREATOGRAPHY, PLACE TUBE/STENT;;  Surgeon: Bravo Barraza MD;  Location: [facility]     ENDOSCOPIC RETROGRADE CHOLANGIOPANCREATOGRAM WITH SPYGLASS N/A 2018    Procedure: Endoscopic retrograde cholangiopancreatography with spyglass cholangioscopy, biliary biopsies, brushings, and stent exchange;  Surgeon: Bravo Barraza MD;  Location: [facility] OR     ENDOSCOPIC RETROGRADE CHOLANGIOPANCREATOGRAM WITH SPYGLASS N/A 2019    Procedure: Endoscopic Retrograde Cholangiopancreatogram with spyglass , bile duct stents exghanged, balloon sweep of bile duct for stones;  Surgeon: Bravo Barraza MD;  Location: [facility]     ENDOSCOPIC RETROGRADE CHOLANGIOPANCREATOGRAPHY, EXCHANGE TUBE/STENT N/A 2019    Procedure: Endoscopic Retrograde Cholangiopancreatogram with biliary stent exchange and stone removal;  Surgeon: Bravo Barraza MD;  Location: [facility]     ENDOSCOPIC RETROGRADE CHOLANGIOPANCREATOGRAPHY, EXCHANGE TUBE/STENT N/A 2019    Procedure: Endoscopic Retrograde Cholangiopancreatogram with biliary stent exchange and stone removal;  Surgeon: Bravo Barraza MD;  [facility]: [facility]     ENDOSCOPIC RETROGRADE CHOLANGIOPANCREATOGRAPHY, EXCHANGE TUBE/STENT N/A 2019    Procedure: Endoscopic Retrograde Cholangiopancreatogram with biliary stent exchange;  Surgeon: Bravo Barraza MD;  Location:  OR      ENDOSCOPIC ULTRASOUND UPPER GASTROINTESTINAL TRACT (GI) N/A 10/9/2018    Procedure: ENDOSCOPIC ULTRASOUND, ESOPHAGOSCOPY / UPPER GASTROINTESTINAL TRACT (GI);   Endoscopic Retrograde Cholangiopancreatogram, pancreatic stent placement, biliary sphincterotomy, cholangioscopy, brushings and biopsies, biliary stent placement;  Surgeon: Bravo Barraza MD;  Location: [facility]     ESOPHAGOSCOPY, GASTROSCOPY, DUODENOSCOPY (EGD), COMBINED N/A 11/5/2018    Procedure: Upper Endoscopic Ultrasound with fine needle biopsy, ;  Surgeon: Bravo Barraza MD;  Location: [facility]     ESOPHAGOSCOPY, GASTROSCOPY, DUODENOSCOPY (EGD), COMBINED N/A 1/22/2019    Procedure: Upper Endoscopic Ultrasound with fine needle aspiration of bile duct mass;  Surgeon: Rober Bernal MD;  Location: [facility]     HERNIA REPAIR       PROSTATE SURGERY            Anesthesia Evaluation     . Pt has had prior anesthetic. Type: General    No history of anesthetic complications          ROS/MED HX    ENT/Pulmonary:  - neg pulmonary ROS   (+)tobacco use, Past use 0.5 packs/day  , . .    Neurologic: Comment: B/L hearing impairment  - neg neurologic ROS     Cardiovascular:     (+) hypertension----. : . . . :. . Previous cardiac testing date:results:date: results:ECG reviewed date:5/26/19 results:SB 56, ?LAD date: results:          METS/Exercise Tolerance:  4 - Raking leaves, gardening   Hematologic:  - neg hematologic  ROS       Musculoskeletal:   (+) arthritis,  -       GI/Hepatic:     (+) GERD Asymptomatic on medication, Other GI/Hepatic cholangiocarcinoma      Renal/Genitourinary:     (+) chronic renal disease, type: CRI, Pt does not require dialysis, Pt has no history of transplant, BPH,       Endo:  - neg endo ROS       Psychiatric:  - neg psychiatric ROS       Infectious Disease:  - neg infectious disease ROS       Malignancy:   (+) Malignancy History of GI and Skin  GI CA  Active status post Chemo, Skin CA Remission status post Surgery, 6/3/19 CT:  1.  Small amount of free fluid in the pelvis. Otherwise stable exam. Stable pulmonary nodule.  2.  Stable biliary dilatation. Normal caliber bowel. No free air. Bilateral renal cysts are stable. Stable lymphadenopathy.        Other:    (+) H/O Chronic Pain,                       PHYSICAL EXAM:   Mental Status/Neuro: A/A/O   Airway: Facies: Feasible  Mallampati: I  Mouth/Opening: Full  TM distance: > 6 cm  Neck ROM: Full   Respiratory: Auscultation: CTAB     Resp. Rate: Normal     Resp. Effort: Normal      CV: Rhythm: Regular  Rate: Age appropriate  Heart: Normal Sounds  Edema: None   Comments:      Dental: Normal Dentition                LABS:  CBC:   Lab Results   Component Value Date    WBC 7.7 11/25/2019    WBC 5.0 09/09/2019    HGB 12.6 (L) 11/25/2019    HGB 11.6 (L) 09/09/2019    HCT 37.3 (L) 11/25/2019    HCT 35.8 (L) 09/09/2019     11/25/2019     09/09/2019     BMP:   Lab Results   Component Value Date     11/25/2019     09/09/2019    POTASSIUM 3.8 11/25/2019    POTASSIUM 4.3 09/09/2019    CHLORIDE 103 11/25/2019    CHLORIDE 102 09/09/2019    CO2 27 11/25/2019    CO2 29 09/09/2019    BUN 16 11/25/2019    BUN 16 09/09/2019    CR 1.11 11/25/2019    CR 1.15 09/09/2019    GLC 99 11/25/2019    GLC 97 09/09/2019     COAGS: No results found for: PTT, INR, FIBR  POC:   Lab Results   Component Value Date    BGM 93 02/24/2020     OTHER:   Lab Results   Component Value Date    LUZMARIA 9.7 11/25/2019    ALBUMIN 3.6 11/25/2019    PROTTOTAL 7.5 11/25/2019    ALT 30 11/25/2019    AST 19 11/25/2019    ALKPHOS 107 11/25/2019    BILITOTAL 0.4 11/25/2019    LIPASE 121 11/25/2019    AMYLASE 74 11/25/2019    TSH 2.61 09/11/2018        Preop Vitals    BP Readings from Last 3 Encounters:   02/24/20 139/80   11/25/19 137/77   09/09/19 125/56    Pulse Readings from Last 3 Encounters:   02/24/20 69   11/25/19 58   09/09/19 57      Resp Readings from Last 3 Encounters:   02/24/20 15   11/25/19 16   09/09/19 16     "SpO2 Readings from Last 3 Encounters:   02/24/20 99%   11/25/19 100%   09/09/19 100%      Temp Readings from Last 1 Encounters:   02/24/20 37.1  C (98.7  F) (Oral)    Ht Readings from Last 1 Encounters:   02/24/20 1.676 m (5' 6\")      Wt Readings from Last 1 Encounters:   02/24/20 58.9 kg (129 lb 13.6 oz)    Estimated body mass index is 20.96 kg/m  as calculated from the following:    Height as of an earlier encounter on 2/24/20: 1.676 m (5' 6\").    Weight as of an earlier encounter on 2/24/20: 58.9 kg (129 lb 13.6 oz).     LDA:  Peripheral IV 02/24/20 Right Upper forearm (Active)   Number of days: 0        Assessment:   ASA SCORE: 3    H&P: History and physical reviewed and following examination; no interval change.   Smoking Status:  Non-Smoker/Unknown        Plan:   Anes. Type:  General   Pre-Medication: None   Induction:  IV (Standard)   Airway: ETT; Oral   Access/Monitoring: PIV   Maintenance: Balanced     Postop Plan:   Postop Pain: Opioids  Postop Sedation/Airway: Not planned  Disposition: Outpatient     PONV Management:   Adult Risk Factors:, Non-Smoker, Postop Opioids   Prevention: Ondansetron, Dexamethasone     CONSENT: Direct conversation   Plan and risks discussed with: Patient   Blood Products: Consent Deferred (Minimal Blood Loss)       Comments for Plan/Consent:  Fiberoptic intubation for teaching purposes                       Joseph Guadarrama MD  "

## 2020-02-24 NOTE — ANESTHESIA CARE TRANSFER NOTE
Patient: Tim Cazares    Procedure(s):  ENDOSCOPIC RETROGRADE CHOLANGIOPANCREATOGRAPHY WITH BILE DUCT STENT EXCHANGE, STONE EXTRACTION AND DILATION    Diagnosis: Encounter for replacement of biliary stent [Z46.89]  Diagnosis Additional Information: No value filed.    Anesthesia Type:   General     Note:  Airway :Nasal Cannula  Patient transferred to:PACU  Comments: Pt extubated in the OR without incident or complicaitons. Pt VSS upon arrival to the PACU. Pt has no c/o pain/N/V. Pt care report given to receiving RN Handoff Report: Identifed the Patient, Identified the Reponsible Provider, Reviewed the pertinent medical history, Discussed the surgical course, Reviewed Intra-OP anesthesia mangement and issues during anesthesia, Set expectations for post-procedure period and Allowed opportunity for questions and acknowledgement of understanding      Vitals: (Last set prior to Anesthesia Care Transfer)    CRNA VITALS  2/24/2020 0757 - 2/24/2020 0831      2/24/2020             Resp Rate (observed):  (!) 2                Electronically Signed By: ARMAAN Mccabe CRNA  February 24, 2020  8:31 AM

## 2020-02-24 NOTE — ANESTHESIA POSTPROCEDURE EVALUATION
Anesthesia POST Procedure Evaluation    Patient: Tim Cazares   MRN:     [id number] Gender:   male   Age:    85 year old :      1935        Preoperative Diagnosis: Encounter for replacement of biliary stent [Z46.89]   Procedure(s):  ENDOSCOPIC RETROGRADE CHOLANGIOPANCREATOGRAPHY WITH BILE DUCT STENT EXCHANGE, STONE EXTRACTION AND DILATION   Postop Comments: No value filed.     Anesthesia Type: General       Disposition: Outpatient   Postop Pain Control: Uneventful            Sign Out: Well controlled pain   PONV: No   Neuro/Psych: Uneventful            Sign Out: Acceptable/Baseline neuro status   Airway/Respiratory: Uneventful            Sign Out: Acceptable/Baseline resp. status   CV/Hemodynamics: Uneventful            Sign Out: Acceptable CV status   Other NRE: NONE   DID A NON-ROUTINE EVENT OCCUR? No         Last Anesthesia Record Vitals:  CRNA VITALS  2020 0757 - 2020 0857      2020             Resp Rate (observed):  (!) 2          Last PACU Vitals:  Vitals Value Taken Time   /75 2020  9:10 AM   Temp 36.5  C (97.7  F) 2020  9:10 AM   Pulse 60 2020  9:10 AM   Resp 18 2020  9:10 AM   SpO2 99 % 2020  9:20 AM   Temp src     NIBP     Pulse     SpO2     Resp     Temp     Ht Rate     Temp 2     Vitals shown include unvalidated device data.      Electronically Signed By: Joseph Guadarrama MD, 2020, 9:22 AM

## 2020-02-24 NOTE — OR NURSING
CRNA and RN verified that patient had both hearing aids in place upon leaving the OR and transferring to PACU.

## 2020-02-24 NOTE — OP NOTE
ERCP 02/24/2020  7:21 AM [facility], [address] [place] [address] [phone]     Endoscopy Department   _______________________________________________________________________________   Patient Name: Tim Cazares         Procedure Date: 2/24/2020 7:21 AM   MRN: [id number]                       Account Number: [id number]   YOB: 1935               Admit Type: Outpatient   Age: 85                                Gender: Male   Note Status: Finalized                Attending MD: Bravo Barraza MD   Pause for the Cause: pause for cause completed Total Sedation Time:   _______________________________________________________________________________       Procedure:           ERCP   Indications:         Cholangiocarcinoma, Stent change, Bile duct stricture   Providers:           Bravo Barraza MD   Patient Profile:     Mr Cazares is an 84yo gentleman with biopsy proven                        cholangiocarcinoma whose chemotherapy has been paused                        indefinitely and returns on schedule for exchange of the                        biliary stents. He notes feeling poorly over the past                        week and had fevers. After another discussion with the                        family, we decided to defer exchange to metal stents and                        proceed to ERCP for exchange of plastic stents.   Referring MD:        Jesus Alberto Nova MD   Medicines:           General Anesthesia, Cipro 400 mg IV, Indomethacin 100 mg                        AK   Complications:       No immediate complications.   _______________________________________________________________________________   Procedure:           Pre-Anesthesia Assessment:                        - Prior to the procedure, a History and Physical was                        performed, and patient medications and allergies were                        reviewed. The patient is competent. The  risks and                        benefits of the procedure and the sedation options and                        risks were discussed with the patient. All questions                        were answered and informed consent was obtained. Patient                        identification and proposed procedure were verified by                        the nurse in the pre-procedure area. Mental Status                        Examination: alert and oriented. Airway Examination:                        Mallampati Class II (the uvula but not tonsillar pillars                        visualized). Respiratory Examination: clear to                        auscultation. CV Examination: normal. ASA Grade                        Assessment: II - A patient with mild systemic disease.                        After reviewing the risks and benefits, the patient was                        deemed in satisfactory condition to undergo the                        procedure. The anesthesia plan was to use general                        anesthesia. Immediately prior to administration of                        medications, the patient was re-assessed for adequacy to                        receive sedatives. The heart rate, respiratory rate,                        oxygen saturations, blood pressure, adequacy of                        pulmonary ventilation, and response to care were                        monitored throughout the procedure. The physical status                        of the patient was re-assessed after the procedure.                        After obtaining informed consent, the scope was passed                        under direct vision. Throughout the procedure, the                        patient's blood pressure, pulse, and oxygen saturations                        were monitored continuously. The duodenoscope was                        introduced through the mouth, and used to inject                        contrast into and used to  "inject contrast into the bile                        duct. The ERCP was accomplished without difficulty. The                        patient tolerated the procedure well.                                                                                     Findings:        A  film demonstrated stable position of the two biliary        stents.Limited white light views of the foregut were notable for partial        to full occlusion of the biliary stents which were appropriately        internalized across a patent biliary sphincterotomy. The two stents were        removed from the biliary tree using a snare. The bile duct was deeply        cannulated with the short-nosed traction sphincterotome in concert with        multiple 0.025\" Visiglide wires. Contrast was injected and I personally        interpreted the bile duct images. Ductal flow of contrast was adequate,        image quality was excellent and contrast extended throughout the        intrahepatics. As previous there were malignant strictures of the        bifurcation, involving the three primaries now with mild peripheral        dilation of the intrahepatics. The common below the bifuraction was        unremarkable and there were no overt filling defect. The biliary tree        was swept with a 12 mm occusion balloon starting at the left        intrahepatic duct(s) and right intrahepatic duct(s). Debris (stone        concretion) was swept from the duct. A 10 Fr by 20 cm transpapillary        Johlin stent with no external flaps and no internal flaps was placed 19        cm into the left intrahepatics. A 10 Fr by 17 cm transpapillary Johlin        stent with no external flaps and no internal flaps was placed 16 cm into        the right posterior intrahepatics. Bile flowed through the stents and        the stent were in good position. The ventral pancreatic duct and orifice        were selectively not interrogated during this procedure.                         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                        involving the bifurcation and each primary insertion)                        now with mild signficant upstream dilation                        - Successful passage dilation of stenoses and removal of                        stone concretion (stent related)                        - Successful placement of two 10F Johlin stents deep to                        the right and left intrahepatics   Recommendation:      - General anesthesia recovery with probable discharge                        home this afternoon                        - Repeat ERCP in 10 weeks for repeat stent exchange                        - Complete the short course of antibiotic as prescribed                        - The findings and recommendations were discussed with                        the patient and their family                                                                                       electronically signed by DAISY Barraza

## 2020-02-24 NOTE — PROGRESS NOTES
Patient arrived to phase 2 3C with only right hearing aid in place. Patient had gone into surgery and PACU with both hearing aids. We were able to find the battery and wire part of the hearing aid but  remains missing. Continuing to look for missing part of hearing aid.

## 2020-02-24 NOTE — LETTER
[address] [facility]  [address]  [place] [address]  [phone]    2020    Re: Tim Cazares  [address]  [place] [id number] [phone] (home)     : 1935      To Whom It May Concern:      Tim Cazares was hospitalized from 2020 until 2020 due to medical illness requiring intervention and close inpatient medical management.  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       Sincerely,          ARMAAN Rod [facility]  [phone]

## 2020-02-24 NOTE — OR NURSING
Pt arrived to PACU @ 0830.  I assumed care of pt from prior PACU Rn @   0840.    Pt at present able to state name and deny pain or nausea.   Lungs with mild wheeze otherwise clear ; tolerating nasal cannula.  -  Additional assessment as per flowsheet.

## 2020-02-24 NOTE — OR NURSING
Pt meeting phase one completion criteria @ 0910.  Alert and oriented times 4.  Denies pain or nausea.   Lungs cont equal and clear bi lat.  Tolerating wean to room air.   SBAR Hand off report to 3c RN Janeth You.  BILLY Guadarrama cleared pt for discharge from pacu to phase two.

## 2020-02-24 NOTE — LETTER
[address] [facility]  [address]  [place] [address]  [phone]    2020    Re: Tim Cazares  [address]   [place] [id number] [phone] (home)     : 1935      To Whom It May Concern:      Tim Cazares was hospitalized from 2020 until 2020 due to medical illness requiring urgent intervention and inpatient medical management.        Sincerely,        ARMAAN Rod [facility]  Pager: [phone]

## 2020-02-24 NOTE — DISCHARGE INSTRUCTIONS
[facility], [place]  Same-Day Surgery   Adult Discharge Orders & Instructions       *Take it easy when you get home.  Remember, same day surgery DOES NOT MEAN SAME DAY RECOVERY!    *Healing is a gradual process and you will need some time to recover - you may be more tired than you realize at first.    *Rest and relax for at least the first 24 hours at home.  You'll feel better and heal faster if you take good care of yourself.    For 24 hours after surgery    1. A responsible adult must stay with you for at least 24 hours after you leave the hospital.   2. Do not drink alcohol, drive or use heavy equipment for 24 hours. This is because you have had anesthesia medications.  3.  Avoid strenuous or risky activities for 24 hours.  Ask for help when climbing stairs.   4. You may feel lightheaded, if so, sit for a few minutes before standing.  You may need someone to help you get up.   5. If you have nausea (feel sick to your stomach): Drink only clear liquids such as water, apple juice, ginger ale, or broth. Rest may also help. Be sure to drink enough fluids. Move to a regular diet as you feel able.  6. You may have a slight fever. Call the doctor if your fever is over 100 F (37.7 C) (taken under the tongue) or lasts longer than 24 hours.  7. You may have a dry mouth, a sore throat, muscle aches or trouble sleeping.  These should go away after 24 hours.  8. Do not make important or legal decisions.     Call your doctor for any of the followin.  Signs of infection: fever, growing tenderness at the surgery site, a large amount of drainage or bleeding, severe pain, foul-smelling drainage, redness, swelling. Please call if you experience any of these symptoms.    2. If it has been 8 to 10 hours since surgery and you are still not able to urinate (pass water), please call.    3.  If you have a headache for over 24 hours, please call.    To contact a doctor, call Dr Barraza at [phone]  "(clinic) or:    '   [phone] and ask for \"the resident on call for Gastroenterology\" (this is the hospital and is answered 24 hours a day)    '   Emergency Department: [facility]: [phone]       (TTY for hearing impaired: [phone])    "

## 2020-02-25 ENCOUNTER — APPOINTMENT (OUTPATIENT)
Dept: GENERAL RADIOLOGY | Facility: CLINIC | Age: 85
DRG: 862 | End: 2020-02-25
Attending: NURSE PRACTITIONER
Payer: MEDICARE

## 2020-02-25 PROBLEM — R74.01 TRANSAMINITIS: Status: ACTIVE | Noted: 2020-02-25

## 2020-02-25 PROBLEM — K83.09 ASCENDING CHOLANGITIS (H): Status: ACTIVE | Noted: 2020-02-25

## 2020-02-25 LAB
ALBUMIN SERPL-MCNC: 2.5 G/DL (ref 3.4–5)
ALBUMIN UR-MCNC: 30 MG/DL
ALP SERPL-CCNC: 931 U/L (ref 40–150)
ALT SERPL W P-5'-P-CCNC: 170 U/L (ref 0–70)
ANION GAP SERPL CALCULATED.3IONS-SCNC: 8 MMOL/L (ref 3–14)
APPEARANCE UR: CLEAR
AST SERPL W P-5'-P-CCNC: 166 U/L (ref 0–45)
BASOPHILS # BLD AUTO: 0 10E9/L (ref 0–0.2)
BASOPHILS NFR BLD AUTO: 0.2 %
BILIRUB SERPL-MCNC: 5.4 MG/DL (ref 0.2–1.3)
BILIRUB UR QL STRIP: ABNORMAL
BUN SERPL-MCNC: 13 MG/DL (ref 7–30)
CALCIUM SERPL-MCNC: 8.6 MG/DL (ref 8.5–10.1)
CHLORIDE SERPL-SCNC: 99 MMOL/L (ref 94–109)
CO2 SERPL-SCNC: 25 MMOL/L (ref 20–32)
COLOR UR AUTO: ABNORMAL
CREAT SERPL-MCNC: 0.91 MG/DL (ref 0.66–1.25)
DIFFERENTIAL METHOD BLD: ABNORMAL
EOSINOPHIL # BLD AUTO: 0 10E9/L (ref 0–0.7)
EOSINOPHIL NFR BLD AUTO: 0.1 %
ERYTHROCYTE [DISTWIDTH] IN BLOOD BY AUTOMATED COUNT: 14.1 % (ref 10–15)
GFR SERPL CREATININE-BSD FRML MDRD: 77 ML/MIN/{1.73_M2}
GLUCOSE BLDC GLUCOMTR-MCNC: 104 MG/DL (ref 70–99)
GLUCOSE SERPL-MCNC: 115 MG/DL (ref 70–99)
GLUCOSE UR STRIP-MCNC: NEGATIVE MG/DL
HCT VFR BLD AUTO: 33 % (ref 40–53)
HGB BLD-MCNC: 11 G/DL (ref 13.3–17.7)
HGB UR QL STRIP: NEGATIVE
IMM GRANULOCYTES # BLD: 0.1 10E9/L (ref 0–0.4)
IMM GRANULOCYTES NFR BLD: 0.4 %
KETONES UR STRIP-MCNC: NEGATIVE MG/DL
LACTATE BLD-SCNC: 0.9 MMOL/L (ref 0.7–2)
LACTATE BLD-SCNC: 1.2 MMOL/L (ref 0.7–2)
LEUKOCYTE ESTERASE UR QL STRIP: NEGATIVE
LYMPHOCYTES # BLD AUTO: 0.9 10E9/L (ref 0.8–5.3)
LYMPHOCYTES NFR BLD AUTO: 5.1 %
MCH RBC QN AUTO: 32.7 PG (ref 26.5–33)
MCHC RBC AUTO-ENTMCNC: 33.3 G/DL (ref 31.5–36.5)
MCV RBC AUTO: 98 FL (ref 78–100)
MONOCYTES # BLD AUTO: 1.5 10E9/L (ref 0–1.3)
MONOCYTES NFR BLD AUTO: 9.3 %
MUCOUS THREADS #/AREA URNS LPF: PRESENT /LPF
NEUTROPHILS # BLD AUTO: 14.1 10E9/L (ref 1.6–8.3)
NEUTROPHILS NFR BLD AUTO: 84.9 %
NITRATE UR QL: NEGATIVE
NRBC # BLD AUTO: 0 10*3/UL
NRBC BLD AUTO-RTO: 0 /100
PH UR STRIP: 6.5 PH (ref 5–7)
PLATELET # BLD AUTO: 281 10E9/L (ref 150–450)
POTASSIUM SERPL-SCNC: 3.7 MMOL/L (ref 3.4–5.3)
PROCALCITONIN SERPL-MCNC: 1.73 NG/ML
PROT SERPL-MCNC: 6.4 G/DL (ref 6.8–8.8)
RBC # BLD AUTO: 3.36 10E12/L (ref 4.4–5.9)
RBC #/AREA URNS AUTO: 0 /HPF (ref 0–2)
SODIUM SERPL-SCNC: 132 MMOL/L (ref 133–144)
SOURCE: ABNORMAL
SP GR UR STRIP: 1.02 (ref 1–1.03)
UROBILINOGEN UR STRIP-MCNC: >12 MG/DL (ref 0–2)
WBC # BLD AUTO: 16.6 10E9/L (ref 4–11)
WBC #/AREA URNS AUTO: 0 /HPF (ref 0–5)

## 2020-02-25 PROCEDURE — 99207 ZZC APP CREDIT; MD BILLING SHARED VISIT: CPT | Performed by: PHYSICIAN ASSISTANT

## 2020-02-25 PROCEDURE — 25000132 ZZH RX MED GY IP 250 OP 250 PS 637: Mod: GY | Performed by: NURSE PRACTITIONER

## 2020-02-25 PROCEDURE — 36415 COLL VENOUS BLD VENIPUNCTURE: CPT | Performed by: PHYSICIAN ASSISTANT

## 2020-02-25 PROCEDURE — 85025 COMPLETE CBC W/AUTO DIFF WBC: CPT | Performed by: PHYSICIAN ASSISTANT

## 2020-02-25 PROCEDURE — 40000141 ZZH STATISTIC PERIPHERAL IV START W/O US GUIDANCE

## 2020-02-25 PROCEDURE — 87040 BLOOD CULTURE FOR BACTERIA: CPT | Performed by: NURSE PRACTITIONER

## 2020-02-25 PROCEDURE — 84145 PROCALCITONIN (PCT): CPT | Performed by: NURSE PRACTITIONER

## 2020-02-25 PROCEDURE — G0378 HOSPITAL OBSERVATION PER HR: HCPCS

## 2020-02-25 PROCEDURE — 99207 ZZC APP CREDIT; MD BILLING SHARED VISIT: CPT | Performed by: INTERNAL MEDICINE

## 2020-02-25 PROCEDURE — 74018 RADEX ABDOMEN 1 VIEW: CPT

## 2020-02-25 PROCEDURE — 00000146 ZZHCL STATISTIC GLUCOSE BY METER IP

## 2020-02-25 PROCEDURE — 25000128 H RX IP 250 OP 636: Performed by: PHYSICIAN ASSISTANT

## 2020-02-25 PROCEDURE — 81001 URINALYSIS AUTO W/SCOPE: CPT | Performed by: NURSE PRACTITIONER

## 2020-02-25 PROCEDURE — 83605 ASSAY OF LACTIC ACID: CPT | Performed by: NURSE PRACTITIONER

## 2020-02-25 PROCEDURE — 87086 URINE CULTURE/COLONY COUNT: CPT | Performed by: NURSE PRACTITIONER

## 2020-02-25 PROCEDURE — 99236 HOSP IP/OBS SAME DATE HI 85: CPT | Mod: Z6 | Performed by: PHYSICIAN ASSISTANT

## 2020-02-25 PROCEDURE — 25800030 ZZH RX IP 258 OP 636: Performed by: PHYSICIAN ASSISTANT

## 2020-02-25 PROCEDURE — 25000132 ZZH RX MED GY IP 250 OP 250 PS 637: Mod: GY | Performed by: PHYSICIAN ASSISTANT

## 2020-02-25 PROCEDURE — 40000802 ZZH SITE CHECK

## 2020-02-25 PROCEDURE — 80053 COMPREHEN METABOLIC PANEL: CPT | Performed by: PHYSICIAN ASSISTANT

## 2020-02-25 PROCEDURE — 12000001 ZZH R&B MED SURG/OB UMMC

## 2020-02-25 PROCEDURE — 83605 ASSAY OF LACTIC ACID: CPT | Performed by: PHYSICIAN ASSISTANT

## 2020-02-25 PROCEDURE — 36415 COLL VENOUS BLD VENIPUNCTURE: CPT | Performed by: NURSE PRACTITIONER

## 2020-02-25 PROCEDURE — 25800030 ZZH RX IP 258 OP 636: Performed by: NURSE PRACTITIONER

## 2020-02-25 RX ORDER — FAMOTIDINE 20 MG/1
40 TABLET, FILM COATED ORAL AT BEDTIME
Status: DISCONTINUED | OUTPATIENT
Start: 2020-02-25 | End: 2020-02-29 | Stop reason: HOSPADM

## 2020-02-25 RX ORDER — ONDANSETRON 4 MG/1
4 TABLET, ORALLY DISINTEGRATING ORAL EVERY 6 HOURS PRN
Status: DISCONTINUED | OUTPATIENT
Start: 2020-02-25 | End: 2020-02-29 | Stop reason: HOSPADM

## 2020-02-25 RX ORDER — METOPROLOL SUCCINATE 50 MG/1
50 TABLET, EXTENDED RELEASE ORAL 2 TIMES DAILY
Status: DISCONTINUED | OUTPATIENT
Start: 2020-02-25 | End: 2020-02-29 | Stop reason: HOSPADM

## 2020-02-25 RX ORDER — LOSARTAN POTASSIUM 100 MG/1
100 TABLET ORAL DAILY
Status: DISCONTINUED | OUTPATIENT
Start: 2020-02-26 | End: 2020-02-27

## 2020-02-25 RX ORDER — LOSARTAN POTASSIUM 100 MG/1
100 TABLET ORAL DAILY
Status: DISCONTINUED | OUTPATIENT
Start: 2020-02-25 | End: 2020-02-25

## 2020-02-25 RX ORDER — PANTOPRAZOLE SODIUM 40 MG/1
40 TABLET, DELAYED RELEASE ORAL
Status: DISCONTINUED | OUTPATIENT
Start: 2020-02-25 | End: 2020-02-29 | Stop reason: HOSPADM

## 2020-02-25 RX ORDER — SODIUM CHLORIDE 9 MG/ML
INJECTION, SOLUTION INTRAVENOUS CONTINUOUS
Status: DISCONTINUED | OUTPATIENT
Start: 2020-02-25 | End: 2020-02-26

## 2020-02-25 RX ORDER — METOPROLOL SUCCINATE 50 MG/1
50 TABLET, EXTENDED RELEASE ORAL 2 TIMES DAILY
Status: DISCONTINUED | OUTPATIENT
Start: 2020-02-25 | End: 2020-02-25

## 2020-02-25 RX ORDER — NALOXONE HYDROCHLORIDE 0.4 MG/ML
.1-.4 INJECTION, SOLUTION INTRAMUSCULAR; INTRAVENOUS; SUBCUTANEOUS
Status: DISCONTINUED | OUTPATIENT
Start: 2020-02-25 | End: 2020-02-29 | Stop reason: HOSPADM

## 2020-02-25 RX ORDER — MEROPENEM 1 G/1
1 INJECTION, POWDER, FOR SOLUTION INTRAVENOUS EVERY 8 HOURS
Status: DISCONTINUED | OUTPATIENT
Start: 2020-02-25 | End: 2020-02-26

## 2020-02-25 RX ORDER — AMLODIPINE BESYLATE 5 MG/1
5 TABLET ORAL DAILY
Status: DISCONTINUED | OUTPATIENT
Start: 2020-02-26 | End: 2020-02-29 | Stop reason: HOSPADM

## 2020-02-25 RX ORDER — ONDANSETRON 2 MG/ML
4 INJECTION INTRAMUSCULAR; INTRAVENOUS EVERY 6 HOURS PRN
Status: DISCONTINUED | OUTPATIENT
Start: 2020-02-25 | End: 2020-02-29 | Stop reason: HOSPADM

## 2020-02-25 RX ORDER — AMLODIPINE BESYLATE 5 MG/1
5 TABLET ORAL DAILY
Status: DISCONTINUED | OUTPATIENT
Start: 2020-02-25 | End: 2020-02-25

## 2020-02-25 RX ORDER — LACTOBACILLUS RHAMNOSUS GG 10B CELL
1 CAPSULE ORAL DAILY
Status: DISCONTINUED | OUTPATIENT
Start: 2020-02-25 | End: 2020-02-29 | Stop reason: HOSPADM

## 2020-02-25 RX ORDER — CIPROFLOXACIN 500 MG/1
500 TABLET, FILM COATED ORAL 2 TIMES DAILY
Status: DISCONTINUED | OUTPATIENT
Start: 2020-02-25 | End: 2020-02-25

## 2020-02-25 RX ADMIN — SODIUM CHLORIDE 500 ML: 9 INJECTION, SOLUTION INTRAVENOUS at 07:54

## 2020-02-25 RX ADMIN — SODIUM CHLORIDE: 9 INJECTION, SOLUTION INTRAVENOUS at 02:58

## 2020-02-25 RX ADMIN — SODIUM CHLORIDE: 9 INJECTION, SOLUTION INTRAVENOUS at 08:45

## 2020-02-25 RX ADMIN — PANTOPRAZOLE SODIUM 40 MG: 40 TABLET, DELAYED RELEASE ORAL at 08:05

## 2020-02-25 RX ADMIN — Medication 1 CAPSULE: at 08:05

## 2020-02-25 RX ADMIN — METOPROLOL SUCCINATE 50 MG: 50 TABLET, EXTENDED RELEASE ORAL at 21:25

## 2020-02-25 RX ADMIN — MEROPENEM 1 G: 1 INJECTION, POWDER, FOR SOLUTION INTRAVENOUS at 23:57

## 2020-02-25 RX ADMIN — SODIUM CHLORIDE 500 ML: 9 INJECTION, SOLUTION INTRAVENOUS at 02:12

## 2020-02-25 RX ADMIN — MEROPENEM 1 G: 1 INJECTION, POWDER, FOR SOLUTION INTRAVENOUS at 15:49

## 2020-02-25 RX ADMIN — MEROPENEM 1 G: 1 INJECTION, POWDER, FOR SOLUTION INTRAVENOUS at 08:15

## 2020-02-25 RX ADMIN — FAMOTIDINE 40 MG: 20 TABLET ORAL at 21:25

## 2020-02-25 ASSESSMENT — ACTIVITIES OF DAILY LIVING (ADL)
ADLS_ACUITY_SCORE: 16
FALL_HISTORY_WITHIN_LAST_SIX_MONTHS: NO
SWALLOWING: 0-->SWALLOWS FOODS/LIQUIDS WITHOUT DIFFICULTY
TRANSFERRING: 0-->INDEPENDENT
RETIRED_COMMUNICATION: 0-->UNDERSTANDS/COMMUNICATES WITHOUT DIFFICULTY
BATHING: 0-->INDEPENDENT
TOILETING: 0-->INDEPENDENT
DRESS: 0-->INDEPENDENT
COGNITION: 0 - NO COGNITION ISSUES REPORTED
RETIRED_EATING: 0-->INDEPENDENT
AMBULATION: 0-->INDEPENDENT
ADLS_ACUITY_SCORE: 20
ADLS_ACUITY_SCORE: 17

## 2020-02-25 NOTE — H&P
"[facility], [place]    History and Physical - ED Observation Service       Date of Admission:  2/24/2020    Assessment & Plan   Tim Cazares is a 85 year old male admitted on 2/24/2020. 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 Had ERCP yesterday with multiple stent exchanges, felt better afterwards, went home, ate, took a nap and then started to feel ill with nausea, vomiting and diarrhea. Fever to 100  F at the hotel. Today after procedure it was felt he needed q10 weeks stent exchanges due to issues of recurrent abdominal pain, nausea and vomiting from his blocked stents. Reports ongoing nausea, poor appetite at this time, chills, diaphoresis, unexpected weight loss as well. In ED, HR 70's-90's, 's-160's/60's-80's, RR 16, SaO2 % on RA, Temp 98.8. Labs show CMP with Na 132, albumin 2.9, TB 5.2, , , , glucose 132 otherwise unremarkable. CBC with WBC 11.1, H/H 12.3/37.2,  otherwise normal. In the ED the patient was given zofran 4mg IV x 1, cipro 500mg po x 1.  Case was discussed with gastroenterology. Per ED: \"It is unclear if this is benign elevation in his liver panel related to his recent procedure, or due to some other complication.\"  He is being admitted to the ED Observation for repeat labs in the morning and monitoring his clinical course.   - Zofran q6h prn nausea/vomiting  - NS 100ml/hr  - Repeat CBC, CMP in AM  - Stool Cdiff and EPASS   - GI " "consult    Chronic Medical Problems:  # HTN: - Continue with PTA losartan, amlodipine, metoprolol  # GERD: - Continue with PTA protonix, zantac        Diet: ADAT to regular diet; NPO at 4am should GI need to do any interviention   DVT Prophylaxis: Low Risk/Ambulatory with no VTE prophylaxis indicated  Bender Catheter: not present  Code Status: full    Disposition Plan   Expected discharge: Tomorrow, recommended to prior living arrangement once adequate pain management/ tolerating PO medications, antibiotic plan established and labs improved, GI consult completed with dispo plan.  Entered: RAMY Mills 02/25/2020, 12:49 AM     The patient's care was discussed with the Attending Physician, Dr. Mckeon.    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 He last had chemo in April 2019 and is currently undergoing CT surveillance for his cholangiocarcinoma every 2-3-months.  He had ERCP today with multiple stent exchanges. Initially he felt better afterwards, went home and ate. He took a nap and then started to feel ill at around 2-3 PM with nausea. He subsequently had vomiting and diarrhea, did vomit with this, containing food content. He vomited once. He did have one episode of loose stools this afternoon. Daughter notes that his temperature was 100  F at the hotel. Patient did not take any Tylenol " "or ibuprofen for this. He states his last cancer check was fine. He denies pain. He endorses chills, feels cold. He has not received anything in triage for nausea. No sick contacts. Patient just returned from [place] yesterday; resides in [place] at this time of the year and has been returning to [place] for his stent exchanges. Family states plan is for patient to have stent exchanges every 10 weeks, which is an increased frequency compared to every 12 weeks previously.  It was felt he needed more frequent stent exchanges due to issues of recurrent abdominal pain, nausea and vomiting from his blocked stents.  Patient is usually treated with a 5-day course of post procedure Cipro for infection prophylaxis, has not taken any doses yet today.  He has ongoing nausea, poor appetite at this time.  Daughter notes that he has had diaphoresis and unexpected weight loss as well.  He has been trying to stay hydrated with water.  He denies any abdominal pain at this time.\"    Operative Report from yesterdays procedure reports: Uncomplicated removal of two occluded, well positioned biliary stents from a patent biliary sphincterotomy. 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 Successful passage dilation of stenoses and removal of stone concretion (stent related). Successful placement of two 10F Johlin stents deep to the right and left intrahepatics.  With recommendation to repeat ERCP in 10 weeks for repeat stent exchange and a short course of antibiotic.    In the ED, HR 70's-90's, 's-160's/60's-80's, RR 16, SaO2 % on RA, Temp 98.8. Labs show CMP with Na 132, albumin 2.9, TB 5.2, , , , glucose 132 otherwise unremarkable. CBC with WBC 11.1, H/H 12.3/37.2,  otherwise normal. In the ED the patient was given zofran 4mg IV x 1, cipro 500mg po x 1.  Case was discussed with " "gastroenterology. Per ED: \"It is unclear if this is benign elevation in his liver panel related to his recent procedure, or due to some other complication.\"  He is being admitted to the ED Observation for repeat labs in the morning and monitoring his clinical course. There is no clinical evidence of ascending cholangitis.      Upon arrival to the ED observation unit patient denies any pain, nausea, chills, headache, lightheadedness.  He is found to have a temperature of 1-1.4 upon arrival however within a period of 1 hour he defervescence to 99.9.    Review of Systems    All other ROS negative except those mentioned in above note.      Past Medical History    I have reviewed this patient's medical history and updated it with pertinent information if needed.   Past Medical History:   Diagnosis Date     CKD (chronic kidney disease)      GERD (gastroesophageal reflux disease)      Hypertension        Past Surgical History   I have reviewed this patient's surgical history and updated it with pertinent information if needed.  Past Surgical History:   Procedure Laterality Date     CHOLECYSTECTOMY       ENDOSCOPIC RETROGRADE CHOLANGIOPANCREATOGRAM N/A 10/9/2018    Procedure: COMBINED ENDOSCOPIC RETROGRADE CHOLANGIOPANCREATOGRAPHY, PLACE TUBE/STENT;;  Surgeon: Bravo Barraza MD;  Location: [facility] OR     ENDOSCOPIC RETROGRADE CHOLANGIOPANCREATOGRAM WITH SPYGLASS N/A 11/5/2018    Procedure: Endoscopic retrograde cholangiopancreatography with spyglass cholangioscopy, biliary biopsies, brushings, and stent exchange;  Surgeon: Bravo Barraza MD;  Location: [facility] OR     ENDOSCOPIC RETROGRADE CHOLANGIOPANCREATOGRAM WITH SPYGLASS N/A 1/22/2019    Procedure: Endoscopic Retrograde Cholangiopancreatogram with spyglass , bile duct stents exghanged, balloon sweep of bile duct for stones;  Surgeon: Bravo Barraza MD;  Location: [facility]     ENDOSCOPIC RETROGRADE CHOLANGIOPANCREATOGRAPHY, EXCHANGE TUBE/STENT N/A 6/7/2019    " Procedure: Endoscopic Retrograde Cholangiopancreatogram with biliary stent exchange and stone removal;  Surgeon: Bravo Barraza MD;  Location: [facility]     ENDOSCOPIC RETROGRADE CHOLANGIOPANCREATOGRAPHY, EXCHANGE TUBE/STENT N/A 9/9/2019    Procedure: Endoscopic Retrograde Cholangiopancreatogram with biliary stent exchange and stone removal;  Surgeon: Bravo Barraza MD;  Location: [facility]     ENDOSCOPIC RETROGRADE CHOLANGIOPANCREATOGRAPHY, EXCHANGE TUBE/STENT N/A 11/25/2019    Procedure: Endoscopic Retrograde Cholangiopancreatogram with biliary stent exchange;  Surgeon: Bravo Barraza MD;  Location: [facility]     ENDOSCOPIC ULTRASOUND UPPER GASTROINTESTINAL TRACT (GI) N/A 10/9/2018    Procedure: ENDOSCOPIC ULTRASOUND, ESOPHAGOSCOPY / UPPER GASTROINTESTINAL TRACT (GI);   Endoscopic Retrograde Cholangiopancreatogram, pancreatic stent placement, biliary sphincterotomy, cholangioscopy, brushings and biopsies, biliary stent placement;  Surgeon: Bravo Barraza MD;  Location: [facility]     ESOPHAGOSCOPY, GASTROSCOPY, DUODENOSCOPY (EGD), COMBINED N/A 11/5/2018    Procedure: Upper Endoscopic Ultrasound with fine needle biopsy, ;  Surgeon: Bravo Barraza MD;  Location: [facility]     ESOPHAGOSCOPY, GASTROSCOPY, DUODENOSCOPY (EGD), COMBINED N/A 1/22/2019    Procedure: Upper Endoscopic Ultrasound with fine needle aspiration of bile duct mass;  Surgeon: Rober Bernal MD;  Location: [facility] OR     HERNIA REPAIR       PROSTATE SURGERY         Social History   I have reviewed this patient's social history and updated it with pertinent information if needed.  Social History     Tobacco Use     Smoking status: Former Smoker     Smokeless tobacco: Never Used     Tobacco comment: quit 55 years ago   Substance Use Topics     Alcohol use: Yes     Comment: social     Drug use: Never       Family History   I have reviewed this patient's family history and updated it with pertinent information if needed.   No family  history on file.    Prior to Admission Medications   Prior to Admission Medications   Prescriptions Last Dose Informant Patient Reported? Taking?   AMLODIPINE BESYLATE PO   Yes No   Sig: Take 5 mg by mouth daily   FUROSEMIDE PO   Yes No   Sig: Take 20 mg by mouth M - W - friday   LOSARTAN POTASSIUM PO   Yes No   Sig: Take 100 mg by mouth daily   METOPROLOL SUCCINATE ER PO   Yes No   Sig: Take 50 mg by mouth 2 times daily   PANTOPRAZOLE SODIUM PO   Yes No   Sig: Take 40 mg by mouth every morning (before breakfast)   Probiotic Product (PROBIOTIC DAILY PO)   Yes No   Sig: Take 1 tablet by mouth daily    RaNITidine HCl (ZANTAC PO)   Yes No   Sig: Take 300 mg by mouth At Bedtime   acetaminophen (TYLENOL) 500 MG tablet   Yes No   Sig: Take 500 mg by mouth every 6 hours as needed    ciprofloxacin (CIPRO) 500 MG tablet   No No   Sig: Take 1 tablet (500 mg) by mouth 2 times daily for 5 days      Facility-Administered Medications: None     Allergies   Allergies   Allergen Reactions     Hydralazine Other (See Comments)     Irregular heart rate  Tingling in hands and feet  Sore throat     Penicillin G      Strawberries [Strawberry]      Sulfa Drugs      Isosorbide Other (See Comments)       Physical Exam   Vital Signs: Temp: 98.8  F (37.1  C) Temp src: Oral BP: (!) 151/68 Pulse: 99   Resp: 16 SpO2: 100 %      Weight: 129 lbs 0 oz    Constitutional: awake, alert, cooperative, no apparent distress, and appears stated age  Eyes: Lids and lashes normal, pupils equal, round and reactive to light, extra ocular muscles intact, sclera clear, conjunctiva normal  ENT: Normocephalic, without obvious abnormality, atraumatic, sinuses nontender on palpation, external ears without lesions, oral pharynx with moist mucous membranes, tonsils without erythema or exudates, gums normal and good dentition.  Hematologic / Lymphatic: no cervical lymphadenopathy  Respiratory: No increased work of breathing, good air exchange, clear to auscultation  bilaterally, no crackles or wheezing  Cardiovascular: Normal apical impulse, regular rate and rhythm, normal S1 and S2, no S3 or S4, and no murmur noted  GI: No scars, normal bowel sounds, soft, non-distended, non-tender, no masses palpated, no hepatosplenomegally  Skin: no bruising or bleeding  Musculoskeletal: There is no redness, warmth, or swelling of the joints.  Full range of motion noted.  Motor strength is 5 out of 5 all extremities bilaterally.  Tone is normal.  Neurologic: Awake, alert, oriented to name, place and time.  Cranial nerves II-XII are grossly intact.  Motor is 5 out of 5 bilaterally.  Cerebellar finger to nose, heel to shin intact.  Sensory is intact.  Babinski down going, Romberg negative, and gait is normal.  Neuropsychiatric: General: normal, calm and normal eye contact    Data   Data reviewed today: I reviewed all medications, new labs and imaging results over the last 24 hours.   Recent Labs   Lab 02/24/20 2200 02/24/20  0701 02/24/20  0645   WBC 11.1* 8.9  --    HGB 12.3* 11.6*  --    * 102*  --     298  --    *  --  135   POTASSIUM 3.6  --  3.7   CHLORIDE 99  --  104   CO2 31  --  25   BUN 14  --  14   CR 1.02  --  0.89   ANIONGAP 3  --  6   LUZMARIA 9.2  --  9.5   *  --  102*   ALBUMIN 2.9*  --   --    PROTTOTAL 7.2  --   --    BILITOTAL 5.2*  --   --    ALKPHOS 985*  --   --    *  --   --    *  --   --    LIPASE  --   --  77     Most Recent 3 CBC's:  Recent Labs   Lab Test 02/24/20 2200 02/24/20  0701 11/25/19  0832   WBC 11.1* 8.9 7.7   HGB 12.3* 11.6* 12.6*   * 102* 99    298 230     Most Recent 3 BMP's:  Recent Labs   Lab Test 02/24/20 2200 02/24/20  0645 11/25/19  0832   * 135 136   POTASSIUM 3.6 3.7 3.8   CHLORIDE 99 104 103   CO2 31 25 27   BUN 14 14 16   CR 1.02 0.89 1.11   ANIONGAP 3 6 5   LUZMARIA 9.2 9.5 9.7   * 102* 99     Most Recent 2 LFT's:  Recent Labs   Lab Test 02/24/20  2200 11/25/19  0832   * 19    * 30   ALKPHOS 985* 107   BILITOTAL 5.2* 0.4     Most Recent 6 glucoses:  Recent Labs   Lab Test 02/24/20  2200 02/24/20  0645 11/25/19  0832 09/09/19  0936 06/07/19  0654 01/22/19  1051   * 102* 99 97 98 94     Most Recent Urinalysis:No lab results found.  Recent Results (from the past 24 hour(s))   XR Surgery ASHLYN Fluoro G/T 5 Min w Stills    Narrative    This exam was marked as non-reportable because it will not be read by a   radiologist or a [place] non-radiologist provider.

## 2020-02-25 NOTE — PROVIDER NOTIFICATION
Gold text paged re: Can pt eat, denies being nauseous? Has been NPO. Will need orders    Response: Diet orders placed

## 2020-02-25 NOTE — PROGRESS NOTES
[facility] Progress Note - Hospitalist [facility] day #0          ASSESSMENT & PLAN     Tim Cazares is a 85 year old male with history of Klatskin tumor cholangiocarcinoma c/b biliary stricture s/p biliary stents, HTN, GERD, and CKD admitted to ED obs on 2/24 following outpatient ERCP and biliary stent exchange due to nausea, vomiting, and diarrhea. Patient developed sepsis with WBC 16.6 and Temp 101.2 F, therefore transferred to inpatient medicine for further management.    1. Sepsis, probable acute cholangitis.  S/p ERCP with biliary stent exchange 2/24. Admitted to ED obs due to N/V/D post-procedure. Afebrile with normal WBC on admission. Spiked fever 101.2 F today with associated WBC 16.6. LFT's significantly elevated and c/w biliary obstruction. Unclear if this is d/t previously occluded stents as no baseline labs prior to procedure. Most recent LFT's on 11/25/19 normal. Vitals stable. No abdominal pain; exam benign. No other clear etiology. GI symptoms have improved. No further diarrhea. No pulmonary or urinary complaints.   - GI consulted  - NPO   - MIVF w NS at 100 ml/h   - PCN allergy, continue meropenem 1g IV q 8h  - Blood cultures x2  - Repeat CBC and CMP in AM  - Discontinue enteric precautions     2. Cholangiocarcinoma with biliary stricture, s/p ERCP w stent exchange (2/24).  Diagnosed in 2018. Completed chemoradiation tx in May 2019. Currently monitoring w surveillance CT's. Biliary stricture managed with frequent ERCP and stent exchanges. Currently no acute oncologic concerns.   - GI consulted as above for biliary stents  - Follow up with OP oncology as directed    3. Hyponatremia.  Suspect mild volume depletion in setting of NPO status and GI fluid loss.   - Continue IV normal saline at 100 ml/h  - Repeat BMP in AM    4. Chronic anemia.  Baseline Hgb 10-12. Currently no evidence of active bleeding.   - Repeat Hgb in  AM    5. HTN.  PTA regimen includes losartan 100mg daily, amlodipine 5mg daily, lasix 20mg daily, metoprolol 50mg BID. Lasix held on admission. BP stable. Meds already given this AM.    - OK to continue losartan, amlodipine and metoprolol  - Continue to hold lasix  - Monitor for hypotension in setting of sepsis    6. GERD.  Stable. Continue PTA ranitidine and protonix.      Code Status: Full Code  DVT Prophylaxis: Pneumatic Compression Devices  Diet: Orders Placed This Encounter      NPO for Medical/Clinical Reasons Except for: Meds      Disposition Plan  Expected discharge: Tomorrow, recommended to prior living arrangement once antibiotic plan established.  Entered: Dominik Ricci PA-C 02/25/2020 9:05 AM        The patient's care was discussed with the Attending Physician, Dr. Bell.    Dominik Ricci PA-C  [facility], Internal Medicine [place]  [phone]    ______________________________________________________________________    Interval History   Patient very [place].  In general feeling well.  Denies any ongoing nausea, vomiting, diarrhea.  Denies any abdominal pain.  No chills.  Denies any sinus congestion, cough, sore throat, dyspnea, chest pain, dysuria, urinary urgency, urinary frequency.     ROS:  Pulm, CV, GI and  performed and negative unless otherwise noted above.     Physical Exam   BP (!) 143/68 (BP Location: Left arm)   Pulse 86   Temp 100.9  F (38.3  C) (Oral)   Resp 20   Wt 58.5 kg (129 lb)   SpO2 97%   BMI 20.82 kg/m     General: Well-developed male.  Appears stated age.  Awake. Alert. NAD.    HEENT:  No scleral icterus. Mucous membranes moist.   Cardiovascular:  RRR. S1, S2. No murmur.   Respiratory:  Normal effort. Lungs CTAB. No wheezing, rhonchi or rales.  Gastrointestinal:  Abdomen soft, non-distended. Active bowel sounds in all quadrants. No tenderness, guarding, or rebound. No mass or HSM.    Neurological:  Grossly non-focal.  Moves all extremities.    Extremities:  No peripheral edema. No calf tenderness.   Skin:  Dry. No visible rash.    Data reviewed today: I personally reviewed all medications, new labs and imaging results over the last 24 hours. Pertinent results below:    CBC:  WBC 16.6, Hgb 11.0, platelets normal  BMP:  Na 132, otherwise normal  LFT:  Albumin 2.5, total Bilirubin 5.4, Alk Phos 931, ,   Other:  Lactate 1.2. Procal 1.73.

## 2020-02-25 NOTE — PLAN OF CARE
VS: /76   Pulse 86   Temp 98.1  F (36.7  C) (Oral)   Resp 16   Wt 60.1 kg (132 lb 9.6 oz)   SpO2 98%   BMI 21.40 kg/m    Pain: Denies pain  Neuro: A&O, [place]. Per pt and family, one hearing aid lost yesterday before ERCP  Cardiac: WNL, Denies chest pain.   Respiratory: On RA O2 sat's above 92%. Denies SOB.  GI/Diet/Appetite:  Advance as tolerated. Pt tolerated juice, water and ice cream. Pt requested more food and diet advanced. Currently denies being nauseous.  : Voiding  LDA's: 2 PIV  Activity: Assist x1    /76 (BP Location: Left arm)   Pulse 86   Temp 98.1  F (36.7  C) (Oral)   Resp 16   Wt 60.1 kg (132 lb 9.6 oz)   SpO2 98%   BMI 21.40 kg/m

## 2020-02-25 NOTE — PLAN OF CARE
Outpatient/Observation goals to be met before discharge home:     -diagnostic tests and consults completed and resulted: NO    -vital signs normal or at patient baseline: NO, febrile    -tolerating oral intake to maintain hydration: NO    -adequate pain control on oral analgesics: YES    -tolerating oral antibiotics or has plans for home infusion setup: NA    -infection is improving: NA    -returns to baseline functional status: YES     -safe disposition plan has been identified: PENDING     -GI consult completed: NO

## 2020-02-25 NOTE — CONSULTS
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    #Extrahepatic cholangiocarcinoma (Klatskin tumor)  #Bismuth IV malignant strictures s/p recurrent ERCP and stent placement  Pt had symptoms of cholangitis which can be due to translocation of bacteria into blood during ERCP. A small chance of biloma formation is also possible. If his Liver enzymes continue to uptrend, repeat imaging can be done based upon which we can decide for repeat ERCP if indicated. His lipase was normal and he does not has abdominal tenderness which rules out pancreatitis.     Recommendations  --Monitor LFTs daily  --Please get repeat abdominal imaging if he becomes septic or hypotensive  --Continue antibiotics, recommend blood cx  --Continue Protonix PO daily  --Continue IV hydration  --Advance diet as tolerates  --Pain and anti-emetics as per primary team    Gastroenterology outpatient follow up recommendations: Pending clinical course    Thank you for involving us in this patient's care. Please do not hesitate to contact the GI service with any questions or concerns.     Pt care plan discussed with Dr. Bernal, GI staff physician.    This note was created with voice recognition software, and while reviewed for accuracy, typos may remain.    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    Pt stated that he was diagnosed with cholangiocarcinoma  in Jan 2019, he received chemo radiation in March-April 2019 but was no tolerating it due to nausea and fatigue so it was paused indefinitely. Family has deferred placement of metallic stents so he gets plastic stents exchanged every 12 weeks. Pt started having symptoms of nausea, intermittent fever and chills, night sweats for past 1-2 weeks, came to [place] on Sunday from [place] where he spends winter, daughter noticed scleral icterus. He underwent ERCP for stent exchange yesterday and as his is becoming symptomatic prior to 12 weeks, his next procedure was recommended to be at 10 weeks.    After the procedure, he went home and had no symptoms but during evening he started having nausea and vomiting with 1 episode of diarrhea, fever was also noted at home and pt was brought to ED.                Past Medical History:   Reviewed and edited as appropriate  Past Medical History:   Diagnosis Date     CKD (chronic kidney disease)      GERD (gastroesophageal reflux disease)      Hypertension             Past Surgical History:   Reviewed and edited as appropriate   Past Surgical History:   Procedure Laterality Date     CHOLECYSTECTOMY       ENDOSCOPIC RETROGRADE CHOLANGIOPANCREATOGRAM N/A 10/9/2018    Procedure: COMBINED ENDOSCOPIC RETROGRADE CHOLANGIOPANCREATOGRAPHY, PLACE TUBE/STENT;;  Surgeon: Bravo Barraza MD;  [facility]     ENDOSCOPIC RETROGRADE  CHOLANGIOPANCREATOGRAM WITH SPYGLASS N/A 11/5/2018    Procedure: Endoscopic retrograde cholangiopancreatography with spyglass cholangioscopy, biliary biopsies, brushings, and stent exchange;  Surgeon: Bravo Barraza MD;  [facility]     ENDOSCOPIC RETROGRADE CHOLANGIOPANCREATOGRAM WITH SPYGLASS N/A 1/22/2019    Procedure: Endoscopic Retrograde Cholangiopancreatogram with spyglass , bile duct stents exghanged, balloon sweep of bile duct for stones;  Surgeon: Bravo Barraza MD;  [facility]     ENDOSCOPIC RETROGRADE CHOLANGIOPANCREATOGRAPHY, EXCHANGE TUBE/STENT N/A 6/7/2019    Procedure: Endoscopic Retrograde Cholangiopancreatogram with biliary stent exchange and stone removal;  Surgeon: Bravo Barraza MD;  [facility]     ENDOSCOPIC RETROGRADE CHOLANGIOPANCREATOGRAPHY, EXCHANGE TUBE/STENT N/A 9/9/2019    Procedure: Endoscopic Retrograde Cholangiopancreatogram with biliary stent exchange and stone removal;  Surgeon: Bravo Barraza MD;  [facility]     ENDOSCOPIC RETROGRADE CHOLANGIOPANCREATOGRAPHY, EXCHANGE TUBE/STENT N/A 11/25/2019    Procedure: Endoscopic Retrograde Cholangiopancreatogram with biliary stent exchange;  Surgeon: Bravo Barraza MD;  [facility]     ENDOSCOPIC ULTRASOUND UPPER GASTROINTESTINAL TRACT (GI) N/A 10/9/2018    Procedure: ENDOSCOPIC ULTRASOUND, ESOPHAGOSCOPY / UPPER GASTROINTESTINAL TRACT (GI);   Endoscopic Retrograde Cholangiopancreatogram, pancreatic stent placement, biliary sphincterotomy, cholangioscopy, brushings and biopsies, biliary stent placement;  Surgeon: Bravo Barraza MD;  [facility]     ESOPHAGOSCOPY, GASTROSCOPY, DUODENOSCOPY (EGD), COMBINED N/A 11/5/2018    Procedure: Upper Endoscopic Ultrasound with fine needle biopsy, ;  Surgeon: Bravo Barraza MD;  [facility]     ESOPHAGOSCOPY, GASTROSCOPY, DUODENOSCOPY (EGD), COMBINED N/A 1/22/2019    Procedure: Upper Endoscopic Ultrasound with fine needle  aspiration of bile duct mass;  Surgeon: Rober Bernal MD;  Location[facility]     HERNIA REPAIR       PROSTATE SURGERY              Previous Endoscopy:   No results found for this or any previous visit.         Social History:   Reviewed and edited as appropriate  Social History     Socioeconomic History     Marital status:      Spouse name: Not on file     Number of children: Not on file     Years of education: Not on file     Highest education level: Not on file   Occupational History     Not on file   Social Needs     Financial resource strain: Not on file     Food insecurity:     Worry: Not on file     Inability: Not on file     Transportation needs:     Medical: Not on file     Non-medical: Not on file   Tobacco Use     Smoking status: Former Smoker     Smokeless tobacco: Never Used     Tobacco comment: quit 55 years ago   Substance and Sexual Activity     Alcohol use: Yes     Comment: social     Drug use: Never     Sexual activity: Not on file   Lifestyle     Physical activity:     Days per week: Not on file     Minutes per session: Not on file     Stress: Not on file   Relationships     Social connections:     Talks on phone: Not on file     Gets together: Not on file     Attends [employer] service: Not on file     Active member of club or organization: Not on file     Attends meetings of clubs or organizations: Not on file     Relationship status: Not on file     Intimate partner violence:     Fear of current or ex partner: Not on file     Emotionally abused: Not on file     Physically abused: Not on file     Forced sexual activity: Not on file   Other Topics Concern     Parent/sibling w/ CABG, MI or angioplasty before 65F 55M? Not Asked   Social History Narrative     Not on file            Family History:   Reviewed and edited as appropriate  No family history on file.           Allergies:   Reviewed and edited as appropriate     Allergies   Allergen Reactions     Hydralazine Other (See Comments)      Irregular heart rate  Tingling in hands and feet  Sore throat     Penicillin G      Strawberries [Strawberry]      Sulfa Drugs      Isosorbide Other (See Comments)            Medications:     Current Facility-Administered Medications   Medication     [START ON 2/26/2020] amLODIPine (NORVASC) tablet 5 mg     famotidine (PEPCID) tablet 40 mg     lactobacillus rhamnosus (GG) (CULTURELL) capsule 1 capsule     [START ON 2/26/2020] losartan (COZAAR) tablet 100 mg     melatonin tablet 1 mg     meropenem (MERREM) 1 g vial to attach to  mL bag     metoprolol succinate ER (TOPROL-XL) 24 hr tablet 50 mg     naloxone (NARCAN) injection 0.1-0.4 mg     ondansetron (ZOFRAN-ODT) ODT tab 4 mg    Or     ondansetron (ZOFRAN) injection 4 mg     pantoprazole (PROTONIX) EC tablet 40 mg     sodium chloride 0.9% infusion             Review of Systems:     A complete 10 point review of systems was performed and is negative except as noted in the HPI           Physical Exam:   /40   Pulse 86   Temp 101.2  F (38.4  C) (Oral)   Resp 12   Wt 58.5 kg (129 lb)   SpO2 95%   BMI 20.82 kg/m    Wt:   Wt Readings from Last 2 Encounters:   02/24/20 58.5 kg (129 lb)   02/24/20 58.9 kg (129 lb 13.6 oz)      Constitutional: cooperative, pleasant  Eyes: Sclera icteric  Ears/nose/mouth/throat: mucus membranes moist  Neck: supple  CV: No edema  Respiratory: Unlabored breathing  Abd: Nondistended, +bs, nontender  Skin: warm, perfused, slight jaundiced  Neuro: AAO x 3  Psych: Normal affect  MSK: No gross deformities         Data:   Labs and imaging below were independently reviewed and interpreted    BMP  Recent Labs   Lab 02/25/20  0559 02/24/20 2200 02/24/20  0645   * 132* 135   POTASSIUM 3.7 3.6 3.7   CHLORIDE 99 99 104   LUZMARIA 8.6 9.2 9.5   CO2 25 31 25   BUN 13 14 14   CR 0.91 1.02 0.89   * 132* 102*     CBC  Recent Labs   Lab 02/25/20  0559 02/24/20 2200 02/24/20  0701   WBC 16.6* 11.1* 8.9   RBC 3.36* 3.70* 3.52*   HGB  11.0* 12.3* 11.6*   HCT 33.0* 37.2* 36.0*   MCV 98 101* 102*   MCH 32.7 33.2* 33.0   MCHC 33.3 33.1 32.2   RDW 14.1 14.0 14.3    325 298     INRNo lab results found in last 7 days.  LFTs  Recent Labs   Lab 02/25/20  0559 02/24/20  2200   ALKPHOS 931* 985*   * 187*   * 188*   BILITOTAL 5.4* 5.2*   PROTTOTAL 6.4* 7.2   ALBUMIN 2.5* 2.9*      PANC  Recent Labs   Lab 02/24/20  0645   LIPASE 77   AMYLASE 53           ERCP 2/24/20:  Impression:          - Uncomplicated removal of two occluded, well positioned                        biliary stents from a patent biliary sphincterotomy                        - Persistent evidence of Bismuth IV malignant strictures                        involving the bifurcation and each primary insertion)                        now with mild signficant upstream dilation                        - Successful passage dilation of stenoses and removal of                        stone concretion (stent related)                        - Successful placement of two 10F Johlin stents deep to                        the right and left intrahepatics   Recommendation:      - General anesthesia recovery with probable discharge                        home this afternoon                        - Repeat ERCP in 10 weeks for repeat stent exchange                        - Complete the short course of antibiotic as prescribed                        - The findings and recommendations were discussed with                        the patient and their family

## 2020-02-25 NOTE — ED PROVIDER NOTES
[facility] EMERGENC[facility] DEPARTMENT ([facility])  2/24/20 ED 17  History     Chief Complaint   Patient presents with     Nausea     The history is provided by the patient and medical records.     Tim Cazares is a 85 year old male with history of Klatskin tumor cholangiocarcinoma first diagnosed in 2018 complicated by bile duct stricture status post capecitabine plus radiation as well as multiple ERCPs with stenting for biliary strictures who presents with nausea, vomiting, and diarrhea after he got undergoing ERCP today. He last had chemo in April 2019 and is currently undergoing CT surveillance for his cholangiocarcinoma every 2-3-months.  He had ERCP today with multiple stent exchanges. Initially he felt better afterwards, went home and ate. He took a nap and then started to feel ill at around 2-3 PM with nausea. He subsequently had vomiting and diarrhea, did vomit with this, containing food content. He vomited once. He did have one episode of loose stools this afternoon. Daughter notes that his temperature was 100  F at the hotel. Patient did not take any Tylenol or ibuprofen for this. He states his last cancer check was fine. He denies pain. He endorses chills, feels cold. He has not received anything in triage for nausea. No sick contacts. Patient just returned from [place] yesterday; resides in [place] at this time of the year and has been returning to [place] for his stent exchanges. Family states plan is for patient to have stent exchanges every 10 weeks, which is an increased frequency compared to every 12 weeks previously.  It was felt he needed more frequent stent exchanges due to issues of recurrent abdominal pain, nausea and vomiting from his blocked stents.  Patient is usually treated with a 5-day course of post procedure Cipro for infection prophylaxis, has not taken any doses yet today.  He has ongoing nausea, poor appetite at this time.  Daughter notes that he has had diaphoresis and  unexpected weight loss as well.  He has been trying to stay hydrated with water.  He denies any abdominal pain at this time.      Wt Readings from Last 10 Encounters:   02/24/20 58.5 kg (129 lb)   02/24/20 58.9 kg (129 lb 13.6 oz)   11/25/19 60.1 kg (132 lb 7.9 oz)   09/09/19 61.8 kg (136 lb 3.9 oz)   06/07/19 57.7 kg (127 lb 3.3 oz)   01/22/19 61.2 kg (134 lb 14.7 oz)   11/05/18 63.5 kg (139 lb 15.9 oz)   10/09/18 62.6 kg (138 lb 0.1 oz)       He has had mild cough.     I have reviewed the Medications, Allergies, Past Medical and Surgical History, and Social History in the [employer] system.  PAST MEDICAL HISTORY:   Past Medical History:   Diagnosis Date     CKD (chronic kidney disease)      GERD (gastroesophageal reflux disease)      Hypertension        PAST SURGICAL HISTORY:   Past Surgical History:   Procedure Laterality Date     CHOLECYSTECTOMY       ENDOSCOPIC RETROGRADE CHOLANGIOPANCREATOGRAM N/A 10/9/2018    Procedure: COMBINED ENDOSCOPIC RETROGRADE CHOLANGIOPANCREATOGRAPHY, PLACE TUBE/STENT;;  Surgeon: Bravo Barraza MD;  [facility]     ENDOSCOPIC RETROGRADE CHOLANGIOPANCREATOGRAM WITH SPYGLASS N/A 11/5/2018    Procedure: Endoscopic retrograde cholangiopancreatography with spyglass cholangioscopy, biliary biopsies, brushings, and stent exchange;  Surgeon: Bravo Barraza MD;  [facility]     ENDOSCOPIC RETROGRADE CHOLANGIOPANCREATOGRAM WITH SPYGLASS N/A 1/22/2019    Procedure: Endoscopic Retrograde Cholangiopancreatogram with spyglass , bile duct stents exghanged, balloon sweep of bile duct for stones;  Surgeon: Bravo Barraza MD;  [facility]     ENDOSCOPIC RETROGRADE CHOLANGIOPANCREATOGRAPHY, EXCHANGE TUBE/STENT N/A 6/7/2019    Procedure: Endoscopic Retrograde Cholangiopancreatogram with biliary stent exchange and stone removal;  Surgeon: Bravo Barraza MD;  [facility]     ENDOSCOPIC RETROGRADE CHOLANGIOPANCREATOGRAPHY, EXCHANGE TUBE/STENT N/A 9/9/2019     Procedure: Endoscopic Retrograde Cholangiopancreatogram with biliary stent exchange and stone removal;  Surgeon: Bravo Barraza MD;  [facility]     ENDOSCOPIC RETROGRADE CHOLANGIOPANCREATOGRAPHY, EXCHANGE TUBE/STENT N/A 11/25/2019    Procedure: Endoscopic Retrograde Cholangiopancreatogram with biliary stent exchange;  Surgeon: Bravo Barraza MD;  [facility]     ENDOSCOPIC ULTRASOUND UPPER GASTROINTESTINAL TRACT (GI) N/A 10/9/2018    Procedure: ENDOSCOPIC ULTRASOUND, ESOPHAGOSCOPY / UPPER GASTROINTESTINAL TRACT (GI);   Endoscopic Retrograde Cholangiopancreatogram, pancreatic stent placement, biliary sphincterotomy, cholangioscopy, brushings and biopsies, biliary stent placement;  Surgeon: Bravo Barraza MD;  [facility]     ESOPHAGOSCOPY, GASTROSCOPY, DUODENOSCOPY (EGD), COMBINED N/A 11/5/2018    Procedure: Upper Endoscopic Ultrasound with fine needle biopsy, ;  Surgeon: Bravo Barraza MD;  [facility]     ESOPHAGOSCOPY, GASTROSCOPY, DUODENOSCOPY (EGD), COMBINED N/A 1/22/2019    Procedure: Upper Endoscopic Ultrasound with fine needle aspiration of bile duct mass;  Surgeon: Rober Bernal MD;  [facility]     HERNIA REPAIR       PROSTATE SURGERY         FAMILY HISTORY: No family history on file.    SOCIAL HISTORY:   Social History     Tobacco Use     Smoking status: Former Smoker     Smokeless tobacco: Never Used     Tobacco comment: quit 55 years ago   Substance Use Topics     Alcohol use: Yes     Comment: social       Patient's Medications   New Prescriptions    No medications on file   Previous Medications    ACETAMINOPHEN (TYLENOL) 500 MG TABLET    Take 500 mg by mouth every 6 hours as needed     AMLODIPINE BESYLATE PO    Take 5 mg by mouth daily    CIPROFLOXACIN (CIPRO) 500 MG TABLET    Take 1 tablet (500 mg) by mouth 2 times daily for 5 days    FUROSEMIDE PO    Take 20 mg by mouth M - W - friday    LOSARTAN POTASSIUM PO    Take 100 mg by mouth daily     METOPROLOL SUCCINATE ER PO    Take 50 mg by mouth 2 times daily    PANTOPRAZOLE SODIUM PO    Take 40 mg by mouth every morning (before breakfast)    PROBIOTIC PRODUCT (PROBIOTIC DAILY PO)    Take 1 tablet by mouth daily     RANITIDINE HCL (ZANTAC PO)    Take 300 mg by mouth At Bedtime   Modified Medications    No medications on file   Discontinued Medications    No medications on file          Allergies   Allergen Reactions     Hydralazine Other (See Comments)     Irregular heart rate  Tingling in hands and feet  Sore throat     Penicillin G      Strawberries [Strawberry]      Sulfa Drugs      Isosorbide Other (See Comments)        Review of Systems   Constitutional: Positive for chills, diaphoresis and unexpected weight change. Negative for fever.   Gastrointestinal: Positive for diarrhea, nausea and vomiting.   All other systems reviewed and are negative.      Physical Exam   BP: (!) 164/70  Pulse: 76  Temp: 98.8  F (37.1  C)  Resp: 16  Weight: 58.5 kg (129 lb)  SpO2: 98 %      Physical Exam  Vitals signs and nursing note reviewed.   Constitutional:       General: He is not in acute distress.     Appearance: He is not diaphoretic.   HENT:      Head: Normocephalic and atraumatic.   Neck:      Musculoskeletal: Normal range of motion and neck supple.   Cardiovascular:      Rate and Rhythm: Normal rate and regular rhythm.   Pulmonary:      Effort: Pulmonary effort is normal.   Abdominal:      General: There is no distension.      Palpations: Abdomen is soft.      Tenderness: There is no abdominal tenderness. There is no guarding.   Musculoskeletal: Normal range of motion.   Skin:     General: Skin is warm and dry.   Neurological:      Mental Status: He is alert and oriented to person, place, and time.      Sensory: No sensory deficit.   Psychiatric:         Mood and Affect: Mood normal.         Behavior: Behavior normal.         ED Course        Procedures                           Results for orders placed or  performed during the hospital encounter of 02/24/20 (from the past 24 hour(s))   CBC with platelets differential   Result Value Ref Range    WBC 11.1 (H) 4.0 - 11.0 10e9/L    RBC Count 3.70 (L) 4.4 - 5.9 10e12/L    Hemoglobin 12.3 (L) 13.3 - 17.7 g/dL    Hematocrit 37.2 (L) 40.0 - 53.0 %     (H) 78 - 100 fl    MCH 33.2 (H) 26.5 - 33.0 pg    MCHC 33.1 31.5 - 36.5 g/dL    RDW 14.0 10.0 - 15.0 %    Platelet Count 325 150 - 450 10e9/L    Diff Method Automated Method     % Neutrophils 82.4 %    % Lymphocytes 7.4 %    % Monocytes 8.9 %    % Eosinophils 0.6 %    % Basophils 0.2 %    % Immature Granulocytes 0.5 %    Nucleated RBCs 0 0 /100    Absolute Neutrophil 9.2 (H) 1.6 - 8.3 10e9/L    Absolute Lymphocytes 0.8 0.8 - 5.3 10e9/L    Absolute Monocytes 1.0 0.0 - 1.3 10e9/L    Absolute Eosinophils 0.1 0.0 - 0.7 10e9/L    Absolute Basophils 0.0 0.0 - 0.2 10e9/L    Abs Immature Granulocytes 0.1 0 - 0.4 10e9/L    Absolute Nucleated RBC 0.0    Comprehensive metabolic panel   Result Value Ref Range    Sodium 132 (L) 133 - 144 mmol/L    Potassium 3.6 3.4 - 5.3 mmol/L    Chloride 99 94 - 109 mmol/L    Carbon Dioxide 31 20 - 32 mmol/L    Anion Gap 3 3 - 14 mmol/L    Glucose 132 (H) 70 - 99 mg/dL    Urea Nitrogen 14 7 - 30 mg/dL    Creatinine 1.02 0.66 - 1.25 mg/dL    GFR Estimate 68 >60 mL/min/[1.73_m2]    GFR Estimate If Black 78 >60 mL/min/[1.73_m2]    Calcium 9.2 8.5 - 10.1 mg/dL    Bilirubin Total 5.2 (H) 0.2 - 1.3 mg/dL    Albumin 2.9 (L) 3.4 - 5.0 g/dL    Protein Total 7.2 6.8 - 8.8 g/dL    Alkaline Phosphatase 985 (H) 40 - 150 U/L     (H) 0 - 70 U/L     (H) 0 - 45 U/L     Medications   ondansetron (ZOFRAN) injection 4 mg (4 mg Intravenous Given 2/24/20 2240)             Assessments & Plan (with Medical Decision Making)   85-year-old male with a history of cholangiocarcinoma with resultant biliary stricture who now receives biliary stent exchange every 12 weeks who presents with nausea vomiting and  abdominal pain.  He had a stent exchange earlier in the day and felt improved initially but then developed nausea and vomited once.  He currently feels improved and his exam is benign.  Lab evaluation showed an elevated bili Esteban 5.2, alk phos 95  and .  I discussed the case with gastroenterology.  It is unclear if this is benign elevation in his liver panel related to his recent procedure, or due to some other complication.  Will admit to observation for repeat labs in the morning and monitoring his clinical course.  He was given his Cipro prophylaxis as prescribed, 500 mg twice daily for the next 5 days.  There is no clinical evidence of ascending cholangitis.      I have reviewed the nursing notes.    I have reviewed the findings, diagnosis, plan and need for follow up with the patient.    New Prescriptions    No medications on file       Final diagnoses:   None       2/24/2020   [facility], [place], EMERGENCY DEPARTMENT    I, Afua Pereira, am serving as a trained medical scribe to document services personally performed by Aditya Davison MD based on the provider's statements to me on February 24, 2020.  This document has been checked and approved by the attending provider.    I, Aditya Davison MD, was physically present and have reviewed and verified the accuracy of this note documented by Afua Pereira, medical scribe.        Aditya Davison MD  02/25/20 0009       Aditya Davison MD  02/25/20 0019

## 2020-02-25 NOTE — ED NOTES
[facility] [place]   ED Nurse to Floor Handoff     Tim Cazares is a 85 year old male who speaks English and lives with family members,  in a home  They arrived in the ED by ambulance from home    ED Chief Complaint: Nausea    ED Dx;   Final diagnoses:   None         Needed?: No    Allergies:   Allergies   Allergen Reactions     Hydralazine Other (See Comments)     Irregular heart rate  Tingling in hands and feet  Sore throat     Penicillin G      Strawberries [Strawberry]      Sulfa Drugs      Isosorbide Other (See Comments)   .  Past Medical Hx:   Past Medical History:   Diagnosis Date     CKD (chronic kidney disease)      GERD (gastroesophageal reflux disease)      Hypertension       Baseline Mental status: WDL  Current Mental Status changes: at basesline    Infection present or suspected this encounter: unknown  Sepsis suspected: No  Isolation type: No active isolations     Activity level - Baseline/Home:  Stand with Assist  Activity Level - Current:   Stand with Assist    Bariatric equipment needed?: No    In the ED these meds were given:   Medications   ciprofloxacin (CIPRO) tablet 500 mg (500 mg Oral Given 2/24/20 8884)   ondansetron (ZOFRAN) injection 4 mg (4 mg Intravenous Given 2/24/20 8760)       Drips running?  No    Home pump  No    Current LDAs  Peripheral IV 02/24/20 Right Upper forearm (Active)   Site Assessment WDL 2/24/2020 10:06 PM   Line Status Saline locked 2/24/2020 10:06 PM   Phlebitis Scale 0-->no symptoms 2/24/2020 10:06 PM   Infiltration Scale 0 2/24/2020 10:06 PM   Infiltration Site Treatment Method  None 2/24/2020 10:06 PM   Extravasation? No 2/24/2020 10:06 PM   Dressing Intervention New dressing  2/24/2020 10:06 PM   Number of days: 1       ETT 7.5 (Active)   Number of days: 1       Labs results:   Labs Ordered and Resulted from Time of ED Arrival Up to the Time of Departure from the ED   CBC WITH PLATELETS DIFFERENTIAL - Abnormal; Notable  for the following components:       Result Value    WBC 11.1 (*)     RBC Count 3.70 (*)     Hemoglobin 12.3 (*)     Hematocrit 37.2 (*)      (*)     MCH 33.2 (*)     Absolute Neutrophil 9.2 (*)     All other components within normal limits   COMPREHENSIVE METABOLIC PANEL - Abnormal; Notable for the following components:    Sodium 132 (*)     Glucose 132 (*)     Bilirubin Total 5.2 (*)     Albumin 2.9 (*)     Alkaline Phosphatase 985 (*)      (*)      (*)     All other components within normal limits   PERIPHERAL IV CATHETER       Imaging Studies:   Recent Results (from the past 24 hour(s))   XR Surgery ASHLYN Fluoro G/T 5 Min w Stills    Narrative    This exam was marked as non-reportable because it will not be read by a   radiologist or a [place] non-radiologist provider.                 Recent vital signs:   BP (!) 151/68   Pulse 99   Temp 98.8  F (37.1  C) (Oral)   Resp 16   Wt 58.5 kg (129 lb)   SpO2 98%   BMI 20.82 kg/m      Jm Coma Scale Score: 15 (02/24/20 2142)       Cardiac Rhythm: N/A  Pt needs tele? No  Skin/wound Issues: None    Code Status: Full Code    Pain control: good    Nausea control: good    Abnormal labs/tests/findings requiring intervention: See lab results    Family present during ED course? Yes   Family Comments/Social Situation comments: Family left for the night    Tasks needing completion: None    Shalini Guerra, RN  [phone] [facility]

## 2020-02-25 NOTE — ED TRIAGE NOTES
Biliary stent placed today. Now has vomiting and diarrhea since 2PM. Called MD and they instructed them to come to the ED for evaluation.

## 2020-02-25 NOTE — DISCHARGE SUMMARY
"Patient ID:  Tim Cazares  MRN: [id number]  85 year old  YOB: 1935    Observation Admit Date: 02/24/2020    ED Admitting Attending: Mitesh Mckeon MD    Transfer Date and Time: February 25, 2020 at 7:22 AM     Transferring Observation Provider: Lily Rolle, MIRYAM, APRN CNP    Admission Diagnoses:     1. 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 He last had chemo in April 2019 and is currently undergoing CT surveillance for his cholangiocarcinoma every 2-3-months.  He had ERCP today with multiple stent exchanges. Initially he felt better afterwards, went home and ate. He took a nap and then started to feel ill at around 2-3 PM with nausea. He subsequently had vomiting and diarrhea, did vomit with this, containing food content. He vomited once. He did have one episode of loose stools this afternoon. Daughter notes that his temperature was 100  F at the hotel. Patient did not take any Tylenol or ibuprofen for this. He states his last cancer check was fine. He denies pain. He endorses chills, feels cold. He has not received anything in triage for nausea. No sick contacts. Patient just returned from [place] yesterday; resides in [place] at this time of the year and has been returning to [place] for his stent exchanges. Family states plan is for patient to have " "stent exchanges every 10 weeks, which is an increased frequency compared to every 12 weeks previously.  It was felt he needed more frequent stent exchanges due to issues of recurrent abdominal pain, nausea and vomiting from his blocked stents.  Patient is usually treated with a 5-day course of post procedure Cipro for infection prophylaxis, has not taken any doses yet today.  He has ongoing nausea, poor appetite at this time.  Daughter notes that he has had diaphoresis and unexpected weight loss as well.  He has been trying to stay hydrated with water.  He denies any abdominal pain at this time.\"    Overnight he was febrile to 101.7. His LFT's remain elevated and his WBC increased to 16.6. He was started IV meropenem for concern for acute cholangitis.  GI was consulted and recommended an abdominal x-ray. They will see the patient this am.    At this time the patient has failed observation management due to transaminits, fever, elevated WBC with concern for acute cholangitis versus other source of infection and will be transferred to inpatient status.     Will given 500 ml NS bolus, hold his Norvasc, losartan, and metoprolol this am. Will check blood cultures, UA/UC, lactic acid, and procalcitonin.       Consults: GI    DATA:    Transfer Exam:    /49   Pulse 86   Temp 101.2  F (38.4  C) (Oral)   Resp 15   Wt 58.5 kg (129 lb)   SpO2 94%   BMI 20.82 kg/m    Exam:  Constitutional: A/0 x4  Head: Normocephalic. No masses, lesions, tenderness or abnormalities  Neck: Neck supple. No adenopathy. Thyroid symmetric, normal size,,   ENT: ENT exam normal, no neck nodes or sinus tenderness  Cardiovascular: negative, PMI normal. No lifts, heaves, or thrills. RRR. No murmurs, clicks gallops or rub  Respiratory: negative, Percussion normal. Good diaphragmatic excursion. Lungs clear  Gastrointestinal: Abdomen soft, non-tender. BS normal. No masses, organomegaly  Musculoskeletal: extremities normal- no gross deformities " noted, gait normal and normal muscle tone  Skin: no suspicious lesions or rashes  Neurologic: Gait normal. Alert and oriented. Sensation grossly WNL.  Psychiatric: mentation appears normal and affect normal/bright    /49   Pulse 86   Temp 101.2  F (38.4  C) (Oral)   Resp 15   Wt 58.5 kg (129 lb)   SpO2 94%   BMI 20.82 kg/m        Current Medications:    No current outpatient medications on file.       Medications Prior to Admission:    Medications Prior to Admission   Medication Sig Dispense Refill Last Dose     acetaminophen (TYLENOL) 500 MG tablet Take 500 mg by mouth every 6 hours as needed    2/23/2020 at 0800     AMLODIPINE BESYLATE PO Take 5 mg by mouth daily   2/23/2020 at 0800     ciprofloxacin (CIPRO) 500 MG tablet Take 1 tablet (500 mg) by mouth 2 times daily for 5 days 10 tablet 0      FUROSEMIDE PO Take 20 mg by mouth M - W - friday 2/23/2020 at 0800     LOSARTAN POTASSIUM PO Take 100 mg by mouth daily   2/23/2020 at 0800     METOPROLOL SUCCINATE ER PO Take 50 mg by mouth 2 times daily   2/24/2020 at 0500     PANTOPRAZOLE SODIUM PO Take 40 mg by mouth every morning (before breakfast)   2/23/2020 at 0800     Probiotic Product (PROBIOTIC DAILY PO) Take 1 tablet by mouth daily    2/23/2020 at 0800     RaNITidine HCl (ZANTAC PO) Take 300 mg by mouth At Bedtime   2/23/2020 at 2100       Significant Diagnostic Studies:     Results for orders placed or performed during the hospital encounter of 02/24/20   CBC with platelets differential     Status: Abnormal   Result Value Ref Range    WBC 11.1 (H) 4.0 - 11.0 10e9/L    RBC Count 3.70 (L) 4.4 - 5.9 10e12/L    Hemoglobin 12.3 (L) 13.3 - 17.7 g/dL    Hematocrit 37.2 (L) 40.0 - 53.0 %     (H) 78 - 100 fl    MCH 33.2 (H) 26.5 - 33.0 pg    MCHC 33.1 31.5 - 36.5 g/dL    RDW 14.0 10.0 - 15.0 %    Platelet Count 325 150 - 450 10e9/L    Diff Method Automated Method     % Neutrophils 82.4 %    % Lymphocytes 7.4 %    % Monocytes 8.9 %    % Eosinophils  0.6 %    % Basophils 0.2 %    % Immature Granulocytes 0.5 %    Nucleated RBCs 0 0 /100    Absolute Neutrophil 9.2 (H) 1.6 - 8.3 10e9/L    Absolute Lymphocytes 0.8 0.8 - 5.3 10e9/L    Absolute Monocytes 1.0 0.0 - 1.3 10e9/L    Absolute Eosinophils 0.1 0.0 - 0.7 10e9/L    Absolute Basophils 0.0 0.0 - 0.2 10e9/L    Abs Immature Granulocytes 0.1 0 - 0.4 10e9/L    Absolute Nucleated RBC 0.0    Comprehensive metabolic panel     Status: Abnormal   Result Value Ref Range    Sodium 132 (L) 133 - 144 mmol/L    Potassium 3.6 3.4 - 5.3 mmol/L    Chloride 99 94 - 109 mmol/L    Carbon Dioxide 31 20 - 32 mmol/L    Anion Gap 3 3 - 14 mmol/L    Glucose 132 (H) 70 - 99 mg/dL    Urea Nitrogen 14 7 - 30 mg/dL    Creatinine 1.02 0.66 - 1.25 mg/dL    GFR Estimate 68 >60 mL/min/[1.73_m2]    GFR Estimate If Black 78 >60 mL/min/[1.73_m2]    Calcium 9.2 8.5 - 10.1 mg/dL    Bilirubin Total 5.2 (H) 0.2 - 1.3 mg/dL    Albumin 2.9 (L) 3.4 - 5.0 g/dL    Protein Total 7.2 6.8 - 8.8 g/dL    Alkaline Phosphatase 985 (H) 40 - 150 U/L     (H) 0 - 70 U/L     (H) 0 - 45 U/L   Lactic acid level STAT     Status: None   Result Value Ref Range    Lactate for Sepsis Protocol 0.9 0.7 - 2.0 mmol/L   Comprehensive metabolic panel     Status: Abnormal   Result Value Ref Range    Sodium 132 (L) 133 - 144 mmol/L    Potassium 3.7 3.4 - 5.3 mmol/L    Chloride 99 94 - 109 mmol/L    Carbon Dioxide 25 20 - 32 mmol/L    Anion Gap 8 3 - 14 mmol/L    Glucose 115 (H) 70 - 99 mg/dL    Urea Nitrogen 13 7 - 30 mg/dL    Creatinine 0.91 0.66 - 1.25 mg/dL    GFR Estimate 77 >60 mL/min/[1.73_m2]    GFR Estimate If Black 90 >60 mL/min/[1.73_m2]    Calcium 8.6 8.5 - 10.1 mg/dL    Bilirubin Total 5.4 (H) 0.2 - 1.3 mg/dL    Albumin 2.5 (L) 3.4 - 5.0 g/dL    Protein Total 6.4 (L) 6.8 - 8.8 g/dL    Alkaline Phosphatase 931 (H) 40 - 150 U/L     (H) 0 - 70 U/L     (H) 0 - 45 U/L   CBC with platelets differential     Status: Abnormal   Result Value Ref Range     WBC 16.6 (H) 4.0 - 11.0 10e9/L    RBC Count 3.36 (L) 4.4 - 5.9 10e12/L    Hemoglobin 11.0 (L) 13.3 - 17.7 g/dL    Hematocrit 33.0 (L) 40.0 - 53.0 %    MCV 98 78 - 100 fl    MCH 32.7 26.5 - 33.0 pg    MCHC 33.3 31.5 - 36.5 g/dL    RDW 14.1 10.0 - 15.0 %    Platelet Count 281 150 - 450 10e9/L    Diff Method Automated Method     % Neutrophils 84.9 %    % Lymphocytes 5.1 %    % Monocytes 9.3 %    % Eosinophils 0.1 %    % Basophils 0.2 %    % Immature Granulocytes 0.4 %    Nucleated RBCs 0 0 /100    Absolute Neutrophil 14.1 (H) 1.6 - 8.3 10e9/L    Absolute Lymphocytes 0.9 0.8 - 5.3 10e9/L    Absolute Monocytes 1.5 (H) 0.0 - 1.3 10e9/L    Absolute Eosinophils 0.0 0.0 - 0.7 10e9/L    Absolute Basophils 0.0 0.0 - 0.2 10e9/L    Abs Immature Granulocytes 0.1 0 - 0.4 10e9/L    Absolute Nucleated RBC 0.0        Signed:  Lily Rolle NP, APRN CNP  February 25, 2020 at 7:22 AM

## 2020-02-26 LAB
ALBUMIN SERPL-MCNC: 2.2 G/DL (ref 3.4–5)
ALP SERPL-CCNC: 716 U/L (ref 40–150)
ALT SERPL W P-5'-P-CCNC: 149 U/L (ref 0–70)
ANION GAP SERPL CALCULATED.3IONS-SCNC: 5 MMOL/L (ref 3–14)
AST SERPL W P-5'-P-CCNC: 146 U/L (ref 0–45)
BACTERIA SPEC CULT: NO GROWTH
BILIRUB SERPL-MCNC: 5.2 MG/DL (ref 0.2–1.3)
BUN SERPL-MCNC: 11 MG/DL (ref 7–30)
CALCIUM SERPL-MCNC: 8.4 MG/DL (ref 8.5–10.1)
CHLORIDE SERPL-SCNC: 103 MMOL/L (ref 94–109)
CO2 SERPL-SCNC: 25 MMOL/L (ref 20–32)
CREAT SERPL-MCNC: 0.83 MG/DL (ref 0.66–1.25)
ERYTHROCYTE [DISTWIDTH] IN BLOOD BY AUTOMATED COUNT: 14.5 % (ref 10–15)
GFR SERPL CREATININE-BSD FRML MDRD: 81 ML/MIN/{1.73_M2}
GLUCOSE SERPL-MCNC: 95 MG/DL (ref 70–99)
HCT VFR BLD AUTO: 31 % (ref 40–53)
HGB BLD-MCNC: 10.2 G/DL (ref 13.3–17.7)
Lab: NORMAL
MCH RBC QN AUTO: 33.1 PG (ref 26.5–33)
MCHC RBC AUTO-ENTMCNC: 32.9 G/DL (ref 31.5–36.5)
MCV RBC AUTO: 101 FL (ref 78–100)
PLATELET # BLD AUTO: 261 10E9/L (ref 150–450)
POTASSIUM SERPL-SCNC: 3.8 MMOL/L (ref 3.4–5.3)
PROT SERPL-MCNC: 5.8 G/DL (ref 6.8–8.8)
RBC # BLD AUTO: 3.08 10E12/L (ref 4.4–5.9)
SODIUM SERPL-SCNC: 133 MMOL/L (ref 133–144)
SPECIMEN SOURCE: NORMAL
WBC # BLD AUTO: 9.6 10E9/L (ref 4–11)

## 2020-02-26 PROCEDURE — 99207 ZZC APP CREDIT; MD BILLING SHARED VISIT: CPT | Performed by: PHYSICIAN ASSISTANT

## 2020-02-26 PROCEDURE — 25800030 ZZH RX IP 258 OP 636: Performed by: PHYSICIAN ASSISTANT

## 2020-02-26 PROCEDURE — 25000128 H RX IP 250 OP 636: Performed by: PHYSICIAN ASSISTANT

## 2020-02-26 PROCEDURE — 99232 SBSQ HOSP IP/OBS MODERATE 35: CPT | Performed by: INTERNAL MEDICINE

## 2020-02-26 PROCEDURE — 85027 COMPLETE CBC AUTOMATED: CPT | Performed by: INTERNAL MEDICINE

## 2020-02-26 PROCEDURE — 25000132 ZZH RX MED GY IP 250 OP 250 PS 637: Mod: GY | Performed by: PHYSICIAN ASSISTANT

## 2020-02-26 PROCEDURE — 36415 COLL VENOUS BLD VENIPUNCTURE: CPT | Performed by: INTERNAL MEDICINE

## 2020-02-26 PROCEDURE — 25000132 ZZH RX MED GY IP 250 OP 250 PS 637: Mod: GY | Performed by: NURSE PRACTITIONER

## 2020-02-26 PROCEDURE — 80053 COMPREHEN METABOLIC PANEL: CPT | Performed by: INTERNAL MEDICINE

## 2020-02-26 PROCEDURE — 12000001 ZZH R&B MED SURG/OB UMMC

## 2020-02-26 RX ORDER — METRONIDAZOLE 500 MG/1
500 TABLET ORAL 3 TIMES DAILY
Status: DISCONTINUED | OUTPATIENT
Start: 2020-02-26 | End: 2020-02-29 | Stop reason: HOSPADM

## 2020-02-26 RX ORDER — CIPROFLOXACIN 500 MG/1
500 TABLET, FILM COATED ORAL EVERY 12 HOURS SCHEDULED
Status: DISCONTINUED | OUTPATIENT
Start: 2020-02-26 | End: 2020-02-29 | Stop reason: HOSPADM

## 2020-02-26 RX ADMIN — MEROPENEM 1 G: 1 INJECTION, POWDER, FOR SOLUTION INTRAVENOUS at 08:20

## 2020-02-26 RX ADMIN — LOSARTAN POTASSIUM 100 MG: 100 TABLET, FILM COATED ORAL at 08:19

## 2020-02-26 RX ADMIN — FAMOTIDINE 40 MG: 20 TABLET ORAL at 22:03

## 2020-02-26 RX ADMIN — METRONIDAZOLE 500 MG: 500 TABLET ORAL at 14:09

## 2020-02-26 RX ADMIN — AMLODIPINE BESYLATE 5 MG: 5 TABLET ORAL at 08:20

## 2020-02-26 RX ADMIN — CIPROFLOXACIN HYDROCHLORIDE 500 MG: 500 TABLET, FILM COATED ORAL at 17:23

## 2020-02-26 RX ADMIN — METOPROLOL SUCCINATE 50 MG: 50 TABLET, EXTENDED RELEASE ORAL at 19:22

## 2020-02-26 RX ADMIN — SODIUM CHLORIDE: 9 INJECTION, SOLUTION INTRAVENOUS at 04:51

## 2020-02-26 RX ADMIN — METRONIDAZOLE 500 MG: 500 TABLET ORAL at 19:22

## 2020-02-26 RX ADMIN — Medication 1 CAPSULE: at 08:20

## 2020-02-26 RX ADMIN — METOPROLOL SUCCINATE 50 MG: 50 TABLET, EXTENDED RELEASE ORAL at 08:20

## 2020-02-26 RX ADMIN — PANTOPRAZOLE SODIUM 40 MG: 40 TABLET, DELAYED RELEASE ORAL at 08:19

## 2020-02-26 ASSESSMENT — ACTIVITIES OF DAILY LIVING (ADL)
ADLS_ACUITY_SCORE: 14
ADLS_ACUITY_SCORE: 16
ADLS_ACUITY_SCORE: 14

## 2020-02-26 NOTE — PROGRESS NOTES
Care Coordinator - Discharge Planning    Admission Date/Time:  2/24/2020  Attending MD:  Destinee Bell,*     Data  Date of initial CC assessment:  2/26/2020  Chart reviewed, discussed with interdisciplinary team.   Patient was admitted for:   1. Elevated liver function tests    2. Malignant neoplasm of intrahepatic bile ducts (H)    3. Transaminitis    4. Malignant neoplasm of biliary duct or passage (H)    5. Nausea and vomiting, intractability of vomiting not specified, unspecified vomiting type         Assessment   Concerns with insurance coverage for discharge needs: None; Medicare & [place] of [place]  Current Living Situation: Patient lives with spouse in far [facility], near to [place]  Support System: Supportive and Involved; daughter and spouse  Services Involved: none  Transportation at Discharge: daughter and spouse  Transportation to Medical Appointments:  - self, daughter and spouse  Barriers to Discharge: none      Coordination of Care  D: Plan of care discussed with Medical Team at Interdisciplinary Rounds, plan for patient to discharge today. Per Dr Bell patient has no RNCC needs and no need for PT eval.    I/A: Chart reviewed;     ERCP yesterday, per nursing notes pt is doing well. Baseline hard of hearing (has hearing aides), out of bed independent, A&O x4 , room air. Daughter and spouse at bedside, very involved.     P: No RNCC needs identified. Care Coordinator will remain available for discharge needs that may arise.      Referrals:  per chart review and per Dr Bell none needed at this time.      Plan  Anticipated Discharge Date:  2/26  Anticipated Discharge Plan:  Home    Ana Peck RN, BSN, PHN  Internal Medicine Care Coordinator  [facility]  Desk Phone: [phone]  Pager: [phone]    To contact Weekend RNCC, dial [phone] and enter job code [phone] at prompt.   This pager can not be contacted by text page or outside line.

## 2020-02-26 NOTE — PLAN OF CARE
[id number] shift   BP (!) 142/71 (BP Location: Left arm)   Pulse 63   Temp 98  F (36.7  C) (Oral)   Resp 16   Wt 60.1 kg (132 lb 9.6 oz)   SpO2 97%   BMI 21.40 kg/m       Neuro: A&O x4, able to make needs known using call light    Cardiac:  WDL, apical pulse regular           Respiratory: Lung sounds clear and equal throughout all fields. No cough reported.  GI/: Abdomen WDL, up to bathroom overnight to void. No difficulties reported.   Diet/appetite: Regular diet, did not eat overnight. Encouraged fluids  Activity:  Up independently, just needs help unplugging pump from wall.   Pain: Denies pain overnight, slept most of the night unless up to bathroom.   Skin: WDL, pt has pants, long shirt, and multiple blankets on. Assessed what I could while patient was in bed. Will continue assessing.   Other: Pt [place], has one hearing aid in. Up frequently to use the bathroom throughout the night, uses call light to ask for help. Denied pain throughout the night. No nausea/vomiting or diarrhea overnight.   Plan: Pt should discharge today (2/26) as long as nausea, vomiting, and diarrhea have stopped.

## 2020-02-26 NOTE — PLAN OF CARE
Shift: [id number]  VS: /67 (BP Location: Left arm)   Pulse 72   Temp 98.1  F (36.7  C) (Oral)   Resp 16   Wt 60.1 kg (132 lb 9.6 oz)   SpO2 96%   BMI 21.40 kg/m    Pain: Denies any pain  Neuro: A&Ox 4, hard of hearing.   Cardiac: WNL, Denies chest pain.   Respiratory: On RA O2 sat's above 92%. Denies SOB.  GI/Diet/Appetite:  Advance as tolerated. Denies any nausea. Tolerating liquids and crackers/toast this evening.   : Voiding spontaneously.   LDA's: 2 PIV- NS at 100 ml/hr.   Activity: Assist x1

## 2020-02-26 NOTE — PROGRESS NOTES
[facility], [facility] Progress Note - Hospitalist Service, Gold 7       Date of Admission:  2/24/2020  Assessment & Plan   Tim Cazares is a 85 year old male with history of Klatskin tumor cholangiocarcinoma c/b biliary stricture s/p biliary stents, HTN, GERD, and CKD admitted to ED obs on 2/24 following outpatient ERCP and biliary stent exchange due to nausea, vomiting, and diarrhea. Patient developed sepsis with WBC 16.6 and Temp 101.2 F, therefore transferred to inpatient medicine for further management.     #Sepsis, probable acute cholangitis: S/p ERCP with biliary stent exchange 2/24. Admitted to ED obs late evening 02/24 due to N/V/D post-procedure. Developed septis 02/25 w/ fever, leukocytosis and elevated LFT's c/w biliary obstruction. Vitals stable. No abdominal pain & exam benign. No other clear etiology (UA w/o e/o infection, no pulm symptoms). Tmax 99.6. Leukocytosis resolved.   -GI consulted  -ADAT to regular  -Discontinue MIVF w NS at 100 ml/h   -Transition to cipro/flagyl for 7 days  -Blood cultures x2 w/ NGTD  -Trend labs in AM  -Discontinue enteric precautions     #Cholangiocarcinoma with biliary stricture, s/p ERCP w stent exchange (2/24): Diagnosed in 2018. Completed chemoradiation tx in May 2019. Currently monitoring w surveillance CT's. Biliary stricture managed with frequent ERCP and stent exchanges. Currently no acute oncologic concerns.   -GI consulted as above for biliary stents  -Follow up with OP oncology as directed    #Hyponatremia, resolved: Suspect mild volume depletion in setting of NPO status and GI fluid loss and resolved w/ gentle IVF hydration. Can trend in AM.    #Chronic anemia: Baseline Hgb 10-12, current hgb of 10.2. No evidence of active bleeding. Can trend in AM.     #HTN: PTA regimen includes losartan 100mg daily, amlodipine 5mg daily, lasix 20mg daily, metoprolol 50mg BID. Lasix held on admission. BP mildly elevated.   -OK to  continue losartan, amlodipine and metoprolol  -Continue to hold lasix  -Monitor for hypotension in setting of sepsis    #GERD: Stable. Continue PTA ranitidine and protonix.     Diet: Advance Diet as Tolerated: Regular Diet Adult    DVT Prophylaxis: Pneumatic Compression Devices and Ambulate every shift, hold w/ recent procedure  Bender Catheter: not present  Code Status: Full Code      Disposition Plan   Expected discharge: Tomorrow, recommended to prior living arrangement once antibiotic plan established.  Entered: Tia Antonio PA-C 02/26/2020, 7:51 AM       The patient's care was discussed with the Attending Physician, Dr. Bell, Bedside Nurse, Care Coordinator/, Patient and GI Consultant.    Tia Antonio PA-C  Hospitalist Service, [address], [place]  Pager: [phone]  Please see sticky note for cross cover information  ______________________________________________________________________    Interval History   The patient notes he feels well. Wants to go home, but understands need for continued monitoring. No abdominal pain, nausea/vomiting. Breathing stable. Had BM today, no melena.     Data reviewed today: I reviewed all medications, new labs and imaging results over the last 24 hours. I personally reviewed no images or EKG's today.    Physical Exam   Vital Signs: Temp: 99.1  F (37.3  C) Temp src: Oral BP: (!) 149/73 Pulse: 72 Heart Rate: 63 Resp: 16 SpO2: 96 % O2 Device: None (Room air)    Weight: 132 lbs 9.6 oz  GENERAL: Alert and oriented x 3.   HEENT: Anicteric sclera. Mucous membranes moist and without lesions.   CV: RRR. S1, S2. No murmurs appreciated.   RESPIRATORY: Effort normal on RA. Lungs CTAB with no wheezing, rales, rhonchi.   GI: Abdomen soft and non distended, bowel sounds present. No tenderness, rebound, guarding.   MUSCULOSKELETAL: No joint swelling or tenderness. Moves all extremities.   NEUROLOGICAL: No focal deficits.    EXTREMITIES: No peripheral edema.   SKIN: No jaundice. No rashes.       Data   Reviewed BG, CMP, CBC

## 2020-02-26 NOTE — PROGRESS NOTES
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     #Extrahepatic cholangiocarcinoma (Klatskin tumor)  #Bismuth IV malignant strictures s/p recurrent ERCP and stent placement  Liver enzymes are downtrending slowly, blood cx negative, leukocytosis resolved with pt feeling improved. If LFTs uptrend, we can have repeat imaging based on which another ERCP may be required for stent repositioning or placing another stent in the anterior duct. We may plan for repeat ERCP in 1-2 days, may require RFA at the same time.    RECOMMENDATIONS:  --Monitor LFTs daily  --Please get repeat abdominal imaging if he becomes septic or hypotensive  --Continue antibiotics for a course of 7 days  --Continue Protonix PO daily  --If oral intake is adequate, can decrease IVF  --Advance diet as tolerates  --Pain and anti-emetics as per primary team    Gastroenterology follow up recommendations: Pending clinical course.      Thank you for involving us in this patient's care. Please do not hesitate to contact the GI service with any questions or concerns.      Pt care plan discussed with Dr. Bella, GI staff physician.    This note was created with voice recognition software, and while reviewed for accuracy, typos may remain.    Ashu Andrade MD  GI Fellow  Pager: [phone]  _______________________________________________________________    Subjective\events within the 24 hours:     Pt was seen in  Am, no fever overnight, no nausea, wants to try breakfast as he is hungry    4 point ROS performed and negative unless noted above.      Medications:     Current  Facility-Administered Medications   Medication     amLODIPine (NORVASC) tablet 5 mg     famotidine (PEPCID) tablet 40 mg     lactobacillus rhamnosus (GG) (CULTURELL) capsule 1 capsule     losartan (COZAAR) tablet 100 mg     melatonin tablet 1 mg     meropenem (MERREM) 1 g vial to attach to  mL bag     metoprolol succinate ER (TOPROL-XL) 24 hr tablet 50 mg     naloxone (NARCAN) injection 0.1-0.4 mg     ondansetron (ZOFRAN-ODT) ODT tab 4 mg    Or     ondansetron (ZOFRAN) injection 4 mg     pantoprazole (PROTONIX) EC tablet 40 mg     sodium chloride 0.9% infusion       Physical Exam     Vital Signs:  BP (!) 141/75 (BP Location: Left arm)   Pulse 68   Temp 99.6  F (37.6  C) (Oral)   Resp 16   Wt 60.1 kg (132 lb 9.6 oz)   SpO2 97%   BMI 21.40 kg/m       Gen: A&Ox3, NAD  HEENT: ncat, perrl, eomi, sclera icteric  Neck: supple  CV:  S1, S2 heard  Lungs: CTA b/l  Abd: +bs, soft, nd/nt  Skin:  jaundice  Extremities: No edema  Neuro: non focal       Data   LABS:  BMP  Recent Labs   Lab 02/26/20  0813 02/25/20  0559 02/24/20 2200 02/24/20  0645    132* 132* 135   POTASSIUM 3.8 3.7 3.6 3.7   CHLORIDE 103 99 99 104   LUZMARIA 8.4* 8.6 9.2 9.5   CO2 25 25 31 25   BUN 11 13 14 14   CR 0.83 0.91 1.02 0.89   GLC 95 115* 132* 102*     CBC  Recent Labs   Lab 02/26/20  0813 02/25/20  0559 02/24/20 2200 02/24/20  0701   WBC 9.6 16.6* 11.1* 8.9   RBC 3.08* 3.36* 3.70* 3.52*   HGB 10.2* 11.0* 12.3* 11.6*   HCT 31.0* 33.0* 37.2* 36.0*   * 98 101* 102*   MCH 33.1* 32.7 33.2* 33.0   MCHC 32.9 33.3 33.1 32.2   RDW 14.5 14.1 14.0 14.3    281 325 298     INRNo lab results found in last 7 days.  LFTs  Recent Labs   Lab 02/26/20  0813 02/25/20  0559 02/24/20  2200   ALKPHOS 716* 931* 985*   * 166* 187*   * 170* 188*   BILITOTAL 5.2* 5.4* 5.2*   PROTTOTAL 5.8* 6.4* 7.2   ALBUMIN 2.2* 2.5* 2.9*      PANC  Recent Labs   Lab 02/24/20  0645   LIPASE 77   AMYLASE 53

## 2020-02-27 ENCOUNTER — APPOINTMENT (OUTPATIENT)
Dept: GENERAL RADIOLOGY | Facility: CLINIC | Age: 85
DRG: 862 | End: 2020-02-27
Attending: INTERNAL MEDICINE
Payer: MEDICARE

## 2020-02-27 ENCOUNTER — ANESTHESIA (OUTPATIENT)
Dept: SURGERY | Facility: CLINIC | Age: 85
DRG: 862 | End: 2020-02-27
Payer: MEDICARE

## 2020-02-27 ENCOUNTER — ANESTHESIA EVENT (OUTPATIENT)
Dept: SURGERY | Facility: CLINIC | Age: 85
DRG: 862 | End: 2020-02-27
Payer: MEDICARE

## 2020-02-27 LAB
ALBUMIN SERPL-MCNC: 2.4 G/DL (ref 3.4–5)
ALP SERPL-CCNC: 984 U/L (ref 40–150)
ALT SERPL W P-5'-P-CCNC: 187 U/L (ref 0–70)
ANION GAP SERPL CALCULATED.3IONS-SCNC: 7 MMOL/L (ref 3–14)
AST SERPL W P-5'-P-CCNC: 218 U/L (ref 0–45)
BILIRUB SERPL-MCNC: 5.8 MG/DL (ref 0.2–1.3)
BUN SERPL-MCNC: 11 MG/DL (ref 7–30)
CALCIUM SERPL-MCNC: 8.8 MG/DL (ref 8.5–10.1)
CHLORIDE SERPL-SCNC: 102 MMOL/L (ref 94–109)
CO2 SERPL-SCNC: 24 MMOL/L (ref 20–32)
CREAT SERPL-MCNC: 0.83 MG/DL (ref 0.66–1.25)
ERYTHROCYTE [DISTWIDTH] IN BLOOD BY AUTOMATED COUNT: 14.2 % (ref 10–15)
GFR SERPL CREATININE-BSD FRML MDRD: 81 ML/MIN/{1.73_M2}
GLUCOSE BLDC GLUCOMTR-MCNC: 94 MG/DL (ref 70–99)
GLUCOSE SERPL-MCNC: 107 MG/DL (ref 70–99)
HCT VFR BLD AUTO: 33.2 % (ref 40–53)
HGB BLD-MCNC: 11.1 G/DL (ref 13.3–17.7)
MCH RBC QN AUTO: 33 PG (ref 26.5–33)
MCHC RBC AUTO-ENTMCNC: 33.4 G/DL (ref 31.5–36.5)
MCV RBC AUTO: 99 FL (ref 78–100)
PLATELET # BLD AUTO: 289 10E9/L (ref 150–450)
POTASSIUM SERPL-SCNC: 3.7 MMOL/L (ref 3.4–5.3)
PROT SERPL-MCNC: 6.3 G/DL (ref 6.8–8.8)
RBC # BLD AUTO: 3.36 10E12/L (ref 4.4–5.9)
SODIUM SERPL-SCNC: 133 MMOL/L (ref 133–144)
WBC # BLD AUTO: 8.2 10E9/L (ref 4–11)

## 2020-02-27 PROCEDURE — 25000128 H RX IP 250 OP 636: Performed by: NURSE ANESTHETIST, CERTIFIED REGISTERED

## 2020-02-27 PROCEDURE — 99232 SBSQ HOSP IP/OBS MODERATE 35: CPT | Performed by: INTERNAL MEDICINE

## 2020-02-27 PROCEDURE — 36415 COLL VENOUS BLD VENIPUNCTURE: CPT | Performed by: PHYSICIAN ASSISTANT

## 2020-02-27 PROCEDURE — C1876 STENT, NON-COA/NON-COV W/DEL: HCPCS | Performed by: INTERNAL MEDICINE

## 2020-02-27 PROCEDURE — 25000125 ZZHC RX 250: Performed by: NURSE ANESTHETIST, CERTIFIED REGISTERED

## 2020-02-27 PROCEDURE — C1769 GUIDE WIRE: HCPCS | Performed by: INTERNAL MEDICINE

## 2020-02-27 PROCEDURE — 85027 COMPLETE CBC AUTOMATED: CPT | Performed by: PHYSICIAN ASSISTANT

## 2020-02-27 PROCEDURE — 99207 ZZC APP CREDIT; MD BILLING SHARED VISIT: CPT | Performed by: PHYSICIAN ASSISTANT

## 2020-02-27 PROCEDURE — 37000009 ZZH ANESTHESIA TECHNICAL FEE, EACH ADDTL 15 MIN: Performed by: INTERNAL MEDICINE

## 2020-02-27 PROCEDURE — 25000128 H RX IP 250 OP 636: Performed by: PHYSICIAN ASSISTANT

## 2020-02-27 PROCEDURE — 0FPB8DZ REMOVAL OF INTRALUMINAL DEVICE FROM HEPATOBILIARY DUCT, VIA NATURAL OR ARTIFICIAL OPENING ENDOSCOPIC: ICD-10-PCS | Performed by: INTERNAL MEDICINE

## 2020-02-27 PROCEDURE — 25500064 ZZH RX 255 OP 636: Performed by: INTERNAL MEDICINE

## 2020-02-27 PROCEDURE — C1726 CATH, BAL DIL, NON-VASCULAR: HCPCS | Performed by: INTERNAL MEDICINE

## 2020-02-27 PROCEDURE — 25800030 ZZH RX IP 258 OP 636: Performed by: NURSE ANESTHETIST, CERTIFIED REGISTERED

## 2020-02-27 PROCEDURE — 25000132 ZZH RX MED GY IP 250 OP 250 PS 637: Mod: GY | Performed by: PHYSICIAN ASSISTANT

## 2020-02-27 PROCEDURE — 37000008 ZZH ANESTHESIA TECHNICAL FEE, 1ST 30 MIN: Performed by: INTERNAL MEDICINE

## 2020-02-27 PROCEDURE — 36000059 ZZH SURGERY LEVEL 3 EA 15 ADDTL MIN UMMC: Performed by: INTERNAL MEDICINE

## 2020-02-27 PROCEDURE — 40000170 ZZH STATISTIC PRE-PROCEDURE ASSESSMENT II: Performed by: INTERNAL MEDICINE

## 2020-02-27 PROCEDURE — 12000001 ZZH R&B MED SURG/OB UMMC

## 2020-02-27 PROCEDURE — 0F768DZ DILATION OF LEFT HEPATIC DUCT WITH INTRALUMINAL DEVICE, VIA NATURAL OR ARTIFICIAL OPENING ENDOSCOPIC: ICD-10-PCS | Performed by: INTERNAL MEDICINE

## 2020-02-27 PROCEDURE — 27210794 ZZH OR GENERAL SUPPLY STERILE: Performed by: INTERNAL MEDICINE

## 2020-02-27 PROCEDURE — 0F758DZ DILATION OF RIGHT HEPATIC DUCT WITH INTRALUMINAL DEVICE, VIA NATURAL OR ARTIFICIAL OPENING ENDOSCOPIC: ICD-10-PCS | Performed by: INTERNAL MEDICINE

## 2020-02-27 PROCEDURE — 36000061 ZZH SURGERY LEVEL 3 W FLUORO 1ST 30 MIN - UMMC: Performed by: INTERNAL MEDICINE

## 2020-02-27 PROCEDURE — 80053 COMPREHEN METABOLIC PANEL: CPT | Performed by: PHYSICIAN ASSISTANT

## 2020-02-27 PROCEDURE — 40000279 XR SURGERY CARM FLUORO GREATER THAN 5 MIN W STILLS: Mod: TC

## 2020-02-27 PROCEDURE — 25000566 ZZH SEVOFLURANE, EA 15 MIN: Performed by: INTERNAL MEDICINE

## 2020-02-27 PROCEDURE — 25000132 ZZH RX MED GY IP 250 OP 250 PS 637: Mod: GY | Performed by: NURSE PRACTITIONER

## 2020-02-27 PROCEDURE — 00000146 ZZHCL STATISTIC GLUCOSE BY METER IP

## 2020-02-27 PROCEDURE — 71000014 ZZH RECOVERY PHASE 1 LEVEL 2 FIRST HR: Performed by: INTERNAL MEDICINE

## 2020-02-27 DEVICE — STENT JOHLIN PANCREA WEDGE 08.5FRX22CM WINTRO G26832
Type: IMPLANTABLE DEVICE | Site: BILE DUCT | Status: NON-FUNCTIONAL
Removed: 2020-05-12

## 2020-02-27 DEVICE — STENT JOHLIN PANCREA WEDGE 10FRX22CM W/INTRO G26828
Type: IMPLANTABLE DEVICE | Site: BILE DUCT | Status: NON-FUNCTIONAL
Removed: 2020-05-12

## 2020-02-27 RX ORDER — IOPAMIDOL 510 MG/ML
INJECTION, SOLUTION INTRAVASCULAR PRN
Status: DISCONTINUED | OUTPATIENT
Start: 2020-02-27 | End: 2020-02-27 | Stop reason: HOSPADM

## 2020-02-27 RX ORDER — ONDANSETRON 2 MG/ML
INJECTION INTRAMUSCULAR; INTRAVENOUS PRN
Status: DISCONTINUED | OUTPATIENT
Start: 2020-02-27 | End: 2020-02-27

## 2020-02-27 RX ORDER — LIDOCAINE HYDROCHLORIDE 20 MG/ML
INJECTION, SOLUTION INFILTRATION; PERINEURAL PRN
Status: DISCONTINUED | OUTPATIENT
Start: 2020-02-27 | End: 2020-02-27

## 2020-02-27 RX ORDER — ONDANSETRON 2 MG/ML
4 INJECTION INTRAMUSCULAR; INTRAVENOUS EVERY 30 MIN PRN
Status: DISCONTINUED | OUTPATIENT
Start: 2020-02-27 | End: 2020-02-27 | Stop reason: HOSPADM

## 2020-02-27 RX ORDER — INDOMETHACIN 50 MG/1
100 SUPPOSITORY RECTAL
Status: DISCONTINUED | OUTPATIENT
Start: 2020-02-27 | End: 2020-02-27

## 2020-02-27 RX ORDER — NALOXONE HYDROCHLORIDE 0.4 MG/ML
.1-.4 INJECTION, SOLUTION INTRAMUSCULAR; INTRAVENOUS; SUBCUTANEOUS
Status: ACTIVE | OUTPATIENT
Start: 2020-02-27 | End: 2020-02-28

## 2020-02-27 RX ORDER — SODIUM CHLORIDE, SODIUM LACTATE, POTASSIUM CHLORIDE, CALCIUM CHLORIDE 600; 310; 30; 20 MG/100ML; MG/100ML; MG/100ML; MG/100ML
INJECTION, SOLUTION INTRAVENOUS CONTINUOUS
Status: DISCONTINUED | OUTPATIENT
Start: 2020-02-27 | End: 2020-02-27 | Stop reason: HOSPADM

## 2020-02-27 RX ORDER — NALOXONE HYDROCHLORIDE 0.4 MG/ML
.1-.4 INJECTION, SOLUTION INTRAMUSCULAR; INTRAVENOUS; SUBCUTANEOUS
Status: DISCONTINUED | OUTPATIENT
Start: 2020-02-27 | End: 2020-02-27

## 2020-02-27 RX ORDER — PROPOFOL 10 MG/ML
INJECTION, EMULSION INTRAVENOUS PRN
Status: DISCONTINUED | OUTPATIENT
Start: 2020-02-27 | End: 2020-02-27

## 2020-02-27 RX ORDER — FLUMAZENIL 0.1 MG/ML
0.2 INJECTION, SOLUTION INTRAVENOUS
Status: ACTIVE | OUTPATIENT
Start: 2020-02-27 | End: 2020-02-28

## 2020-02-27 RX ORDER — ONDANSETRON 4 MG/1
4 TABLET, ORALLY DISINTEGRATING ORAL EVERY 30 MIN PRN
Status: DISCONTINUED | OUTPATIENT
Start: 2020-02-27 | End: 2020-02-27 | Stop reason: HOSPADM

## 2020-02-27 RX ORDER — SODIUM CHLORIDE, SODIUM LACTATE, POTASSIUM CHLORIDE, CALCIUM CHLORIDE 600; 310; 30; 20 MG/100ML; MG/100ML; MG/100ML; MG/100ML
INJECTION, SOLUTION INTRAVENOUS CONTINUOUS PRN
Status: DISCONTINUED | OUTPATIENT
Start: 2020-02-27 | End: 2020-02-27

## 2020-02-27 RX ORDER — FENTANYL CITRATE 50 UG/ML
INJECTION, SOLUTION INTRAMUSCULAR; INTRAVENOUS PRN
Status: DISCONTINUED | OUTPATIENT
Start: 2020-02-27 | End: 2020-02-27

## 2020-02-27 RX ORDER — LIDOCAINE 40 MG/G
CREAM TOPICAL
Status: DISCONTINUED | OUTPATIENT
Start: 2020-02-27 | End: 2020-02-27 | Stop reason: HOSPADM

## 2020-02-27 RX ORDER — DEXAMETHASONE SODIUM PHOSPHATE 4 MG/ML
INJECTION, SOLUTION INTRA-ARTICULAR; INTRALESIONAL; INTRAMUSCULAR; INTRAVENOUS; SOFT TISSUE PRN
Status: DISCONTINUED | OUTPATIENT
Start: 2020-02-27 | End: 2020-02-27

## 2020-02-27 RX ORDER — FENTANYL CITRATE 50 UG/ML
25-50 INJECTION, SOLUTION INTRAMUSCULAR; INTRAVENOUS
Status: DISCONTINUED | OUTPATIENT
Start: 2020-02-27 | End: 2020-02-27 | Stop reason: HOSPADM

## 2020-02-27 RX ORDER — LEVOFLOXACIN 5 MG/ML
INJECTION, SOLUTION INTRAVENOUS PRN
Status: DISCONTINUED | OUTPATIENT
Start: 2020-02-27 | End: 2020-02-27

## 2020-02-27 RX ORDER — HYDROXYZINE HYDROCHLORIDE 10 MG/1
10 TABLET, FILM COATED ORAL EVERY 6 HOURS PRN
Status: DISCONTINUED | OUTPATIENT
Start: 2020-02-27 | End: 2020-02-29 | Stop reason: HOSPADM

## 2020-02-27 RX ORDER — INDOMETHACIN 50 MG/1
100 SUPPOSITORY RECTAL
Status: DISCONTINUED | OUTPATIENT
Start: 2020-02-27 | End: 2020-02-27 | Stop reason: HOSPADM

## 2020-02-27 RX ADMIN — SODIUM CHLORIDE, POTASSIUM CHLORIDE, SODIUM LACTATE AND CALCIUM CHLORIDE: 600; 310; 30; 20 INJECTION, SOLUTION INTRAVENOUS at 15:46

## 2020-02-27 RX ADMIN — LEVOFLOXACIN 500 MG: 5 INJECTION, SOLUTION INTRAVENOUS at 16:07

## 2020-02-27 RX ADMIN — CIPROFLOXACIN HYDROCHLORIDE 500 MG: 500 TABLET, FILM COATED ORAL at 19:16

## 2020-02-27 RX ADMIN — LOSARTAN POTASSIUM 100 MG: 100 TABLET, FILM COATED ORAL at 08:00

## 2020-02-27 RX ADMIN — ONDANSETRON 4 MG: 2 INJECTION INTRAMUSCULAR; INTRAVENOUS at 08:17

## 2020-02-27 RX ADMIN — SUGAMMADEX 200 MG: 100 INJECTION, SOLUTION INTRAVENOUS at 17:17

## 2020-02-27 RX ADMIN — METRONIDAZOLE 500 MG: 500 TABLET ORAL at 19:16

## 2020-02-27 RX ADMIN — ONDANSETRON 4 MG: 2 INJECTION INTRAMUSCULAR; INTRAVENOUS at 17:12

## 2020-02-27 RX ADMIN — Medication 1 CAPSULE: at 08:00

## 2020-02-27 RX ADMIN — DEXAMETHASONE SODIUM PHOSPHATE 4 MG: 4 INJECTION, SOLUTION INTRA-ARTICULAR; INTRALESIONAL; INTRAMUSCULAR; INTRAVENOUS; SOFT TISSUE at 16:09

## 2020-02-27 RX ADMIN — AMLODIPINE BESYLATE 5 MG: 5 TABLET ORAL at 08:00

## 2020-02-27 RX ADMIN — CIPROFLOXACIN HYDROCHLORIDE 500 MG: 500 TABLET, FILM COATED ORAL at 08:00

## 2020-02-27 RX ADMIN — METOPROLOL SUCCINATE 50 MG: 50 TABLET, EXTENDED RELEASE ORAL at 08:00

## 2020-02-27 RX ADMIN — LIDOCAINE HYDROCHLORIDE 100 MG: 20 INJECTION, SOLUTION INFILTRATION; PERINEURAL at 15:51

## 2020-02-27 RX ADMIN — HYDROXYZINE HYDROCHLORIDE 10 MG: 10 TABLET ORAL at 21:07

## 2020-02-27 RX ADMIN — FENTANYL CITRATE 50 MCG: 50 INJECTION, SOLUTION INTRAMUSCULAR; INTRAVENOUS at 16:37

## 2020-02-27 RX ADMIN — ROCURONIUM BROMIDE 40 MG: 10 INJECTION INTRAVENOUS at 15:51

## 2020-02-27 RX ADMIN — PROPOFOL 110 MG: 10 INJECTION, EMULSION INTRAVENOUS at 15:51

## 2020-02-27 RX ADMIN — ROCURONIUM BROMIDE 10 MG: 10 INJECTION INTRAVENOUS at 16:25

## 2020-02-27 RX ADMIN — FENTANYL CITRATE 25 MCG: 50 INJECTION, SOLUTION INTRAMUSCULAR; INTRAVENOUS at 17:11

## 2020-02-27 RX ADMIN — PANTOPRAZOLE SODIUM 40 MG: 40 TABLET, DELAYED RELEASE ORAL at 08:00

## 2020-02-27 RX ADMIN — FAMOTIDINE 40 MG: 20 TABLET ORAL at 21:08

## 2020-02-27 RX ADMIN — METRONIDAZOLE 500 MG: 500 TABLET ORAL at 08:00

## 2020-02-27 ASSESSMENT — LIFESTYLE VARIABLES: TOBACCO_USE: 1

## 2020-02-27 ASSESSMENT — ACTIVITIES OF DAILY LIVING (ADL)
ADLS_ACUITY_SCORE: 12
ADLS_ACUITY_SCORE: 14

## 2020-02-27 ASSESSMENT — MIFFLIN-ST. JEOR: SCORE: 1228.75

## 2020-02-27 NOTE — ANESTHESIA CARE TRANSFER NOTE
Patient: Tim Cazares    Procedure(s):  ENDOSCOPIC RETROGRADE CHOLANGIOPANCREATOGRAPHY, stent exchange, stent placement    Diagnosis: Abnormal endoscopic retrograde cholangiopancreatography (ERCP) [R93.2]  Diagnosis Additional Information: No value filed.    Anesthesia Type:   General     Note:  Airway :Nasal Cannula  Patient transferred to:PACU  Handoff Report: Identifed the Patient, Identified the Reponsible Provider, Reviewed the pertinent medical history, Discussed the surgical course, Reviewed Intra-OP anesthesia mangement and issues during anesthesia, Set expectations for post-procedure period and Allowed opportunity for questions and acknowledgement of understanding      Vitals: (Last set prior to Anesthesia Care Transfer)    CRNA VITALS  2/27/2020 1655 - 2/27/2020 1729      2/27/2020             Resp Rate (observed):  20                Electronically Signed By: ARMAAN Naylor CRNA  February 27, 2020  5:29 PM

## 2020-02-27 NOTE — PROGRESS NOTES
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 Patient developed sepsis with WBC 16.6 and Temp 101.2 F, therefore transferred to inpatient medicine for further management.     #Sepsis, probable acute cholangitis: S/p ERCP with biliary stent exchange 2/24. Admitted to ED obs late evening 02/24 due to N/V/D post-procedure. Developed septis 02/25 w/ fever, leukocytosis and elevated LFT's c/w biliary obstruction- stable to slightly worse today. Vitals stable. No abdominal pain & exam benign. No other clear etiology (UA w/o e/o infection, no pulm symptoms). Tmax 99. Leukocytosis resolved.   -GI consulted, plan for repeat ERCP today  -NPO for now for procedure, diet plan per GI  -IVF per GI intra procedure  -Continue cipro/flagyl for 7 days, further antibiotic recs per GI post procedure  -Blood cultures x2 w/ NGTD  -Trend labs in AM  -Discontinue enteric precautions      #Cholangiocarcinoma with biliary stricture, s/p ERCP w stent exchange (2/24): Diagnosed in 2018. Completed chemoradiation tx in May 2019. Currently monitoring w surveillance CT's. Biliary stricture managed with frequent ERCP and stent exchanges. Currently no acute oncologic concerns.   -GI consulted as above for biliary stents  -Follow up with OP oncology as directed     #Hyponatremia, resolved: Suspect mild volume depletion in setting of NPO status and GI fluid loss and resolved w/ gentle IVF hydration. Can trend in AM.     #Chronic anemia: Baseline Hgb 10-12, current hgb of 11.1. No evidence of active bleeding. Can trend in AM.      #HTN: PTA regimen includes losartan 100mg  daily, amlodipine 5mg daily, lasix 20mg daily, metoprolol 50mg BID. Lasix held on admission. BP mildly elevated.   -OK to continue amlodipine and metoprolol  -Hold losartan w/ procedure  -Continue to hold lasix  -Monitor for hypotension in setting of sepsis     #GERD: Stable. Continue PTA ranitidine and protonix.    Diet: Advance Diet as Tolerated: Regular Diet Adult    DVT Prophylaxis: Pneumatic Compression Devices and Ambulate every shift  Bender Catheter: not present  Code Status: Full Code      Disposition Plan   Expected discharge: Tomorrow, recommended to prior living arrangement once adequate pain management/ tolerating PO medications, antibiotic plan established and safe disposition plan/ TCU bed available.  Entered: Tia Antonio PA-C 02/27/2020, 8:10 AM       The patient's care was discussed with the Attending Physician, Dr. Bell, Bedside Nurse, Care Coordinator/, Patient and GI Consultant.    Tia Antonio PA-C  Hospitalist Service, [address], [place]  Pager: [phone]  Please see sticky note for cross cover information  ______________________________________________________________________    Interval History   The patient notes he feels okay. Vitals were stable overnight. No further fevers. No abdominal pain, nausea or vomiting. Had BM yesterday. No issues urinating.     Data reviewed today: I reviewed all medications, new labs and imaging results over the last 24 hours. I personally reviewed no images or EKG's today.    Physical Exam   Vital Signs: Temp: 98.1  F (36.7  C) Temp src: Oral BP: (!) 141/70 Pulse: 66 Heart Rate: 65 Resp: 14 SpO2: 97 % O2 Device: None (Room air)    Weight: 132 lbs 9.6 oz  GENERAL: Alert and oriented x 3. Sitting comfortably in bed.   HEENT: Anicteric sclera. Mucous membranes moist and without lesions.   CV: RRR. S1, S2. No murmurs appreciated.   RESPIRATORY: Effort normal on RA. Lungs CTAB with no wheezing,  rales, rhonchi.   MUSCULOSKELETAL: Moves all extremities.   NEUROLOGICAL: No focal deficits.   EXTREMITIES: No peripheral edema.   SKIN: No jaundice. No rashes.       Data   Reviewed CMP, CBC

## 2020-02-27 NOTE — PLAN OF CARE
Shift: 4243-5742  VS: /68 (BP Location: Left arm)   Pulse 66   Temp 98.9  F (37.2  C) (Oral)   Resp 16   Wt 60.1 kg (132 lb 9.6 oz)   SpO2 98%   BMI 21.40 kg/m    Pain: denies.  Neuro: A&O x4. [place] - hearing aide at bedside.  Cardiac: AVSS.  Respiratory: Satting >92% on RA.  GI/Diet/Appetite: Tolerating regular diet. NPO @ mn for repeat ERCP.  : Voiding spontaneously without difficulty.  LDA's: R PIV saline locked.  Skin: Skin intact.  Activity: Up independently.  Tests/Procedures: Pending ERCP tomorrow.  Pertinent Labs/Lab Collection: N/A     Plan: Will continue w/ POC.

## 2020-02-27 NOTE — BRIEF OP NOTE
[facility], [place]    Brief Operative Note    Pre-operative diagnosis: Abnormal endoscopic retrograde cholangiopancreatography (ERCP) [R93.2]  Post-operative diagnosis Biliary stricture    Procedure: Procedure(s):  ENDOSCOPIC RETROGRADE CHOLANGIOPANCREATOGRAPHY, stent exchange, stent placement  Surgeon: Surgeon(s) and Role:     * Paramjit Bella MD - Primary     * Chivo Maloney MD - Fellow - Assisting  Anesthesia: General   Estimated blood loss: None  Drains: None  Specimens: * No specimens in log *    Complications: None.  Implants:   Implant Name Type Inv. Item Serial No.  Lot No. LRB No. Used   STENT JOHLIN PANCREA WEDGE [id number] WINTRO Stent STENT JOHLIN PANCREA WEDGE [id number] WINTRO  [employer] [id number] N/A 1   STENT JOHLIN PANCREA WEDGE [id number] W/INTRO Stent STENT JOHLIN PANCREA WEDGE [id number] W/INTRO  [employer] [id number] N/A 2       Findings:     -Previous stent were removed without difficult  -Sludge was removed from the biliary tree  -Persistent biliary stricture  -The right anterior duct was dilated to 4mm  -Plastic stents were placed in the right anterior, right posterior, and the left intrahepatic duct      Recommendations:  -Monitor in PACU , then transfer to floor for ongoing care  -Ice chips for now  -Continue antibiotics  -Additional recommendations per inpatient Panc bili team       Chivo Maloney MD  Interventional Endoscopy Fellow

## 2020-02-27 NOTE — OR NURSING
"\" please place preop orders on [facility] when able.  thanks Justice *[phone]\"  This text sent to MD Bella @ [phone].   "

## 2020-02-27 NOTE — PLAN OF CARE
Shift: 0017-8596  Neuro: A&O x4. Able to make needs known.    Cardiac:  WDL           Respiratory: Posterior lung sounds clear and equal bilaterally   GI/: Abdomen soft and nontender. Up independently to void throughout the night   Diet/appetite: NPO at midnight for ERCP in AM  Activity:  Independent   Pain: Denies pain.   Skin: WDL, no abnormalities noted.   Vitals: BP (!) 146/72 (BP Location: Right arm)   Pulse 66   Temp 99  F (37.2  C) (Oral)   Resp 16   Wt 60.1 kg (132 lb 9.6 oz)   SpO2 99%   BMI 21.40 kg/m    Plan: ERCP in AM    Pt sleeping most of the night. Up independently. [place], has hearing aids. Afebrile overnight but has been febrile upon admission and mentioned he was nervous about spiking a fever. Will continue to monitor.

## 2020-02-27 NOTE — ANESTHESIA PREPROCEDURE EVALUATION
Anesthesia Pre-Procedure Evaluation    Patient: Tim Cazares   MRN:     [id number] Gender:   male   Age:    84 year old :      1935        Preoperative Diagnosis: Cholangiocarcinoma   Procedure(s):  Endoscopic Retrograde Cholangiopancreatogram     Past Medical History:   Diagnosis Date     CKD (chronic kidney disease)      GERD (gastroesophageal reflux disease)      Hypertension       Past Surgical History:   Procedure Laterality Date     CHOLECYSTECTOMY       ENDOSCOPIC RETROGRADE CHOLANGIOPANCREATOGRAM N/A 10/9/2018    Procedure: COMBINED ENDOSCOPIC RETROGRADE CHOLANGIOPANCREATOGRAPHY, PLACE TUBE/STENT;;  Surgeon: Bravo Barraza MD;  Location: [facility] OR     ENDOSCOPIC RETROGRADE CHOLANGIOPANCREATOGRAM WITH SPYGLASS N/A 2018    Procedure: Endoscopic retrograde cholangiopancreatography with spyglass cholangioscopy, biliary biopsies, brushings, and stent exchange;  Surgeon: Bravo Barraza MD;  Location: [facility] OR     ENDOSCOPIC RETROGRADE CHOLANGIOPANCREATOGRAM WITH SPYGLASS N/A 2019    Procedure: Endoscopic Retrograde Cholangiopancreatogram with spyglass , bile duct stents exghanged, balloon sweep of bile duct for stones;  Surgeon: Bravo Barraza MD;  Location: [facility] OR     ENDOSCOPIC RETROGRADE CHOLANGIOPANCREATOGRAPHY, EXCHANGE TUBE/STENT N/A 2019    Procedure: Endoscopic Retrograde Cholangiopancreatogram with biliary stent exchange and stone removal;  Surgeon: Bravo Barraza MD;  Location: [facility] OR     ENDOSCOPIC RETROGRADE CHOLANGIOPANCREATOGRAPHY, EXCHANGE TUBE/STENT N/A 2019    Procedure: Endoscopic Retrograde Cholangiopancreatogram with biliary stent exchange and stone removal;  Surgeon: Bravo Barraza MD;  Location: [facility] OR     ENDOSCOPIC RETROGRADE CHOLANGIOPANCREATOGRAPHY, EXCHANGE TUBE/STENT N/A 2019    Procedure: Endoscopic Retrograde Cholangiopancreatogram with biliary stent exchange;  Surgeon: Bravo Barraza MD;  Location:  OR      ENDOSCOPIC RETROGRADE CHOLANGIOPANCREATOGRAPHY, EXCHANGE TUBE/STENT N/A 2/24/2020    Procedure: ENDOSCOPIC RETROGRADE CHOLANGIOPANCREATOGRAPHY WITH BILE DUCT STENT EXCHANGE, STONE EXTRACTION AND DILATION;  Surgeon: Bravo Barraza MD;  Location: [facility]     ENDOSCOPIC ULTRASOUND UPPER GASTROINTESTINAL TRACT (GI) N/A 10/9/2018    Procedure: ENDOSCOPIC ULTRASOUND, ESOPHAGOSCOPY / UPPER GASTROINTESTINAL TRACT (GI);   Endoscopic Retrograde Cholangiopancreatogram, pancreatic stent placement, biliary sphincterotomy, cholangioscopy, brushings and biopsies, biliary stent placement;  Surgeon: Bravo Barraza MD;  Location: [facility] OR     ESOPHAGOSCOPY, GASTROSCOPY, DUODENOSCOPY (EGD), COMBINED N/A 11/5/2018    Procedure: Upper Endoscopic Ultrasound with fine needle biopsy, ;  Surgeon: Bravo Barraza MD;  Location: [facility] OR     ESOPHAGOSCOPY, GASTROSCOPY, DUODENOSCOPY (EGD), COMBINED N/A 1/22/2019    Procedure: Upper Endoscopic Ultrasound with fine needle aspiration of bile duct mass;  Surgeon: Rober Bernal MD;  Location: [facility] OR     HERNIA REPAIR       PROSTATE SURGERY            Anesthesia Evaluation     . Pt has had prior anesthetic. Type: General    No history of anesthetic complications          ROS/MED HX    ENT/Pulmonary:  - neg pulmonary ROS   (+)tobacco use, Past use 0.5 packs/day  , . .    Neurologic: Comment: B/L hearing impairment  - neg neurologic ROS     Cardiovascular:     (+) hypertension----. : . . . :. . Previous cardiac testing date:results:date: results:ECG reviewed date:5/26/19 results:SB 56, ?LAD date: results:          METS/Exercise Tolerance:  4 - Raking leaves, gardening   Hematologic:  - neg hematologic  ROS       Musculoskeletal:   (+) arthritis,  -       GI/Hepatic:     (+) GERD Asymptomatic on medication, Other GI/Hepatic cholangiocarcinoma      Renal/Genitourinary:     (+) chronic renal disease, type: CRI, Pt does not require dialysis, Pt has no history of transplant, BPH,        Endo:  - neg endo ROS       Psychiatric:  - neg psychiatric ROS       Infectious Disease:  - neg infectious disease ROS       Malignancy:   (+) Malignancy History of GI and Skin  GI CA  Active status post Chemo, Skin CA Remission status post Surgery, 6/3/19 CT: 1.  Small amount of free fluid in the pelvis. Otherwise stable exam. Stable pulmonary nodule.  2.  Stable biliary dilatation. Normal caliber bowel. No free air. Bilateral renal cysts are stable. Stable lymphadenopathy.        Other:    (+) H/O Chronic Pain,                       PHYSICAL EXAM:   Mental Status/Neuro: A/A/O   Airway: Facies: Feasible  Mallampati: I  Mouth/Opening: Full  TM distance: > 6 cm  Neck ROM: Full   Respiratory: Auscultation: CTAB     Resp. Rate: Normal     Resp. Effort: Normal      CV: Rhythm: Regular  Rate: Age appropriate  Heart: Normal Sounds  Edema: None   Comments:                      LABS:  CBC:   Lab Results   Component Value Date    WBC 8.2 02/27/2020    WBC 9.6 02/26/2020    HGB 11.1 (L) 02/27/2020    HGB 10.2 (L) 02/26/2020    HCT 33.2 (L) 02/27/2020    HCT 31.0 (L) 02/26/2020     02/27/2020     02/26/2020     BMP:   Lab Results   Component Value Date     02/27/2020     02/26/2020    POTASSIUM 3.7 02/27/2020    POTASSIUM 3.8 02/26/2020    CHLORIDE 102 02/27/2020    CHLORIDE 103 02/26/2020    CO2 24 02/27/2020    CO2 25 02/26/2020    BUN 11 02/27/2020    BUN 11 02/26/2020    CR 0.83 02/27/2020    CR 0.83 02/26/2020     (H) 02/27/2020    GLC 95 02/26/2020     COAGS: No results found for: PTT, INR, FIBR  POC:   Lab Results   Component Value Date    BGM 94 02/27/2020     OTHER:   Lab Results   Component Value Date    LACT 1.2 02/25/2020    LUZMARIA 8.8 02/27/2020    ALBUMIN 2.4 (L) 02/27/2020    PROTTOTAL 6.3 (L) 02/27/2020     (H) 02/27/2020     (H) 02/27/2020    ALKPHOS 984 (H) 02/27/2020    BILITOTAL 5.8 (H) 02/27/2020    LIPASE 77 02/24/2020    AMYLASE 53 02/24/2020    TSH 2.61  "09/11/2018        Preop Vitals    BP Readings from Last 3 Encounters:   02/27/20 (!) 141/70   02/24/20 130/77   11/25/19 137/77    Pulse Readings from Last 3 Encounters:   02/26/20 66   02/24/20 58   11/25/19 58      Resp Readings from Last 3 Encounters:   02/27/20 14   02/24/20 14   11/25/19 16    SpO2 Readings from Last 3 Encounters:   02/27/20 97%   02/24/20 100%   11/25/19 100%      Temp Readings from Last 1 Encounters:   02/27/20 36.7  C (98.1  F) (Oral)    Ht Readings from Last 1 Encounters:   02/24/20 1.676 m (5' 6\")      Wt Readings from Last 1 Encounters:   02/25/20 60.1 kg (132 lb 9.6 oz)    Estimated body mass index is 21.4 kg/m  as calculated from the following:    Height as of 2/24/20: 1.676 m (5' 6\").    Weight as of 2/25/20: 60.1 kg (132 lb 9.6 oz).     LDA:  Peripheral IV 02/25/20 Right;Posterior;Medial Lower forearm (Active)   Site Assessment WDL 2/27/2020 10:00 AM   Line Status Saline locked 2/27/2020 10:00 AM   Phlebitis Scale 0-->no symptoms 2/27/2020 10:00 AM   Infiltration Scale 0 2/27/2020 10:00 AM   Infiltration Site Treatment Method  None 2/27/2020 10:00 AM   Extravasation? No 2/27/2020 10:00 AM   Dressing Intervention New dressing  2/25/2020  8:00 AM   Number of days: 2       ETT 7.5 (Active)   Number of days: 3        Assessment:   ASA SCORE: 3    H&P: History and physical reviewed and following examination; no interval change.   Smoking Status:  Non-Smoker/Unknown        Plan:   Anes. Type:  General   Pre-Medication: None   Induction:  IV (Standard)   Airway: ETT; Oral   Access/Monitoring: PIV   Maintenance: Balanced     Postop Plan:   Postop Pain: Opioids  Postop Sedation/Airway: Not planned  Disposition: Inpatient/Admit     PONV Management:   Adult Risk Factors:, Non-Smoker, Postop Opioids   Prevention: Ondansetron     CONSENT: Direct conversation   Plan and risks discussed with: Patient   Blood Products: Consented (ALL Blood Products)       Comments for Plan/Consent:  Fiberoptic " intubation for teaching purposes                       Adolfo Stover MD

## 2020-02-27 NOTE — OR NURSING
Informed Farhana DE DIOS @ 1130- pt requets to wear right hearing aid in ear to OR.  She verbally acknowledged.

## 2020-02-27 NOTE — ANESTHESIA POSTPROCEDURE EVALUATION
Anesthesia POST Procedure Evaluation    Patient: Tim Cazares   MRN:     [id number] Gender:   male   Age:    85 year old :      1935        Preoperative Diagnosis: Abnormal endoscopic retrograde cholangiopancreatography (ERCP) [R93.2]   Procedure(s):  ENDOSCOPIC RETROGRADE CHOLANGIOPANCREATOGRAPHY, stent exchange, stent placement   Postop Comments: No value filed.     Anesthesia Type: General          Postop Pain Control: Uneventful            Sign Out: Well controlled pain   PONV: No   Neuro/Psych: Uneventful            Sign Out: Acceptable/Baseline neuro status   Airway/Respiratory: Uneventful            Sign Out: Acceptable/Baseline resp. status   CV/Hemodynamics: Uneventful            Sign Out: Acceptable CV status   Other NRE: NONE   DID A NON-ROUTINE EVENT OCCUR? No         Last Anesthesia Record Vitals:  CRNA VITALS  2020 1655 - 2020 1735      2020             NIBP:  122/66    Ht Rate:  56    SpO2:  99 %          Last PACU Vitals:  Vitals Value Taken Time   /66 2020  5:30 PM   Temp     Pulse 58 2020  5:30 PM   Resp     SpO2 100 % 2020  5:34 PM   Temp src     NIBP     Pulse     SpO2     Resp     Temp     Ht Rate     Temp 2     Vitals shown include unvalidated device data.      Electronically Signed By: Paige Valerio MD, 2020, 5:35 PM

## 2020-02-28 LAB
ALBUMIN SERPL-MCNC: 2.4 G/DL (ref 3.4–5)
ALP SERPL-CCNC: 996 U/L (ref 40–150)
ALT SERPL W P-5'-P-CCNC: 170 U/L (ref 0–70)
ANION GAP SERPL CALCULATED.3IONS-SCNC: 7 MMOL/L (ref 3–14)
AST SERPL W P-5'-P-CCNC: 146 U/L (ref 0–45)
BILIRUB SERPL-MCNC: 5.3 MG/DL (ref 0.2–1.3)
BUN SERPL-MCNC: 15 MG/DL (ref 7–30)
CALCIUM SERPL-MCNC: 9.1 MG/DL (ref 8.5–10.1)
CHLORIDE SERPL-SCNC: 105 MMOL/L (ref 94–109)
CO2 SERPL-SCNC: 22 MMOL/L (ref 20–32)
CREAT SERPL-MCNC: 0.82 MG/DL (ref 0.66–1.25)
ERYTHROCYTE [DISTWIDTH] IN BLOOD BY AUTOMATED COUNT: 14.1 % (ref 10–15)
GFR SERPL CREATININE-BSD FRML MDRD: 82 ML/MIN/{1.73_M2}
GLUCOSE SERPL-MCNC: 145 MG/DL (ref 70–99)
HCT VFR BLD AUTO: 34.4 % (ref 40–53)
HGB BLD-MCNC: 11.6 G/DL (ref 13.3–17.7)
MCH RBC QN AUTO: 32.8 PG (ref 26.5–33)
MCHC RBC AUTO-ENTMCNC: 33.7 G/DL (ref 31.5–36.5)
MCV RBC AUTO: 97 FL (ref 78–100)
PLATELET # BLD AUTO: 343 10E9/L (ref 150–450)
POTASSIUM SERPL-SCNC: 4 MMOL/L (ref 3.4–5.3)
PROT SERPL-MCNC: 6.6 G/DL (ref 6.8–8.8)
RBC # BLD AUTO: 3.54 10E12/L (ref 4.4–5.9)
SODIUM SERPL-SCNC: 134 MMOL/L (ref 133–144)
WBC # BLD AUTO: 6.4 10E9/L (ref 4–11)

## 2020-02-28 PROCEDURE — 36415 COLL VENOUS BLD VENIPUNCTURE: CPT | Performed by: PHYSICIAN ASSISTANT

## 2020-02-28 PROCEDURE — 80053 COMPREHEN METABOLIC PANEL: CPT | Performed by: PHYSICIAN ASSISTANT

## 2020-02-28 PROCEDURE — 25000132 ZZH RX MED GY IP 250 OP 250 PS 637: Mod: GY | Performed by: NURSE PRACTITIONER

## 2020-02-28 PROCEDURE — 99207 ZZC APP CREDIT; MD BILLING SHARED VISIT: CPT | Performed by: NURSE PRACTITIONER

## 2020-02-28 PROCEDURE — 99232 SBSQ HOSP IP/OBS MODERATE 35: CPT | Performed by: INTERNAL MEDICINE

## 2020-02-28 PROCEDURE — 25000132 ZZH RX MED GY IP 250 OP 250 PS 637: Mod: GY | Performed by: PHYSICIAN ASSISTANT

## 2020-02-28 PROCEDURE — 12000001 ZZH R&B MED SURG/OB UMMC

## 2020-02-28 PROCEDURE — 85027 COMPLETE CBC AUTOMATED: CPT | Performed by: PHYSICIAN ASSISTANT

## 2020-02-28 RX ADMIN — Medication 1 CAPSULE: at 07:46

## 2020-02-28 RX ADMIN — CIPROFLOXACIN HYDROCHLORIDE 500 MG: 500 TABLET, FILM COATED ORAL at 07:46

## 2020-02-28 RX ADMIN — PANTOPRAZOLE SODIUM 40 MG: 40 TABLET, DELAYED RELEASE ORAL at 07:46

## 2020-02-28 RX ADMIN — FAMOTIDINE 40 MG: 20 TABLET ORAL at 21:18

## 2020-02-28 RX ADMIN — METOPROLOL SUCCINATE 50 MG: 50 TABLET, EXTENDED RELEASE ORAL at 07:46

## 2020-02-28 RX ADMIN — METRONIDAZOLE 500 MG: 500 TABLET ORAL at 14:52

## 2020-02-28 RX ADMIN — METRONIDAZOLE 500 MG: 500 TABLET ORAL at 21:19

## 2020-02-28 RX ADMIN — CIPROFLOXACIN HYDROCHLORIDE 500 MG: 500 TABLET, FILM COATED ORAL at 21:19

## 2020-02-28 RX ADMIN — AMLODIPINE BESYLATE 5 MG: 5 TABLET ORAL at 07:46

## 2020-02-28 RX ADMIN — METRONIDAZOLE 500 MG: 500 TABLET ORAL at 07:46

## 2020-02-28 ASSESSMENT — ACTIVITIES OF DAILY LIVING (ADL)
ADLS_ACUITY_SCORE: 12
ADLS_ACUITY_SCORE: 14
ADLS_ACUITY_SCORE: 12
ADLS_ACUITY_SCORE: 12

## 2020-02-28 ASSESSMENT — MIFFLIN-ST. JEOR: SCORE: 1234.68

## 2020-02-28 NOTE — PLAN OF CARE
"Time: 6163-1026    BP (!) 146/46   Pulse 55   Temp 97.5  F (36.4  C) (Oral)   Resp 16   Ht 1.676 m (5' 6\")   Wt 59.1 kg (130 lb 4.7 oz)   SpO2 97%   BMI 21.03 kg/m      Reason for admission: Elevated liver function tests [R94.5]    New this shift: Pt transferred to  post-op for ERCP, see notes for details. VSS on RA except francisco cardia. Disoriented to time and situation. [place], family at bedside. Bed alarm on for safety. Tolerating clear liquid diet. Continuing with oral abx.Atarax prn for itching.     Plan: Discharge tomorrow, recommended to prior living arrangement once adequate pain management/ tolerating PO medications, antibiotic plan established and safe disposition plan/ TCU bed available.      "

## 2020-02-28 NOTE — PROGRESS NOTES
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 Expect continued improvement in LFTs. We may consider RFA for the tumor upon next ERCP.     RECOMMENDATIONS:  --Advance diet as tolerates  --Avoid anticoagulation for 72hrs  --Monitor LFTs tomorrow, If stable or downtrending can be discharged from GI perspective  --Continue Abx course for 7 days total      GI will follow peripherally, if further questions, please call over weekend.    Gastroenterology follow up recommendations: Follow up with Dr. Barraza as previously planned for stent exchange in 10 weeks     Thank you for involving us in this patient's care. Please do not hesitate to contact the GI service with any questions or concerns.      Pt care plan discussed with Dr. Bella, GI staff physician.    This note was created with voice recognition software, and while reviewed for accuracy, typos may remain.    Ashu Andrade MD  GI Fellow  Pager: [phone]  _______________________________________________________________    Subjective\events within the 24 hours:     Pt was seen in am, no abd pain, tolerating liquid diet, questions were answered regarding procedure    4 point ROS performed and negative unless noted above.      Medications:     Current Facility-Administered Medications   Medication     amLODIPine (NORVASC) tablet 5 mg      "ciprofloxacin (CIPRO) tablet 500 mg     famotidine (PEPCID) tablet 40 mg     hydrOXYzine (ATARAX) tablet 10 mg     lactobacillus rhamnosus (GG) (CULTURELL) capsule 1 capsule     May continue current IV fluid if patient has IV fluids infusing until discharge.     melatonin tablet 1 mg     metoprolol succinate ER (TOPROL-XL) 24 hr tablet 50 mg     metroNIDAZOLE (FLAGYL) tablet 500 mg     naloxone (NARCAN) injection 0.1-0.4 mg     naloxone (NARCAN) injection 0.1-0.4 mg     ondansetron (ZOFRAN-ODT) ODT tab 4 mg    Or     ondansetron (ZOFRAN) injection 4 mg     pantoprazole (PROTONIX) EC tablet 40 mg     sodium chloride (PF) 0.9% PF flush 3 mL       Physical Exam     Vital Signs:  /77   Pulse 84   Temp 97.5  F (36.4  C) (Oral)   Resp 16   Ht 1.676 m (5' 6\")   Wt 59.1 kg (130 lb 4.7 oz)   SpO2 98%   BMI 21.03 kg/m       Gen: A&Ox3, NAD  HEENT: ncat, perrl, eomi, sclera icteric  Neck: supple  CV:  S1, S2 heard  Lungs: CTA b/l  Abd: +bs, soft, nd/nt  Skin:  jaundiced  Extremities: No edema  MS: appropriate muscle mass for age  Neuro: non focal       Data   LABS:  BMP  Recent Labs   Lab 02/28/20 0613 02/27/20 0622 02/26/20  0813 02/25/20  0559    133 133 132*   POTASSIUM 4.0 3.7 3.8 3.7   CHLORIDE 105 102 103 99   LUZMARIA 9.1 8.8 8.4* 8.6   CO2 22 24 25 25   BUN 15 11 11 13   CR 0.82 0.83 0.83 0.91   * 107* 95 115*     CBC  Recent Labs   Lab 02/28/20 0613 02/27/20 0622 02/26/20  0813 02/25/20  0559   WBC 6.4 8.2 9.6 16.6*   RBC 3.54* 3.36* 3.08* 3.36*   HGB 11.6* 11.1* 10.2* 11.0*   HCT 34.4* 33.2* 31.0* 33.0*   MCV 97 99 101* 98   MCH 32.8 33.0 33.1* 32.7   MCHC 33.7 33.4 32.9 33.3   RDW 14.1 14.2 14.5 14.1    289 261 281     INRNo lab results found in last 7 days.  LFTs  Recent Labs   Lab 02/28/20  0613 02/27/20  0622 02/26/20  0813 02/25/20  0559   ALKPHOS 996* 984* 716* 931*   * 218* 146* 166*   * 187* 149* 170*   BILITOTAL 5.3* 5.8* 5.2* 5.4*   PROTTOTAL 6.6* 6.3* 5.8* 6.4* "   ALBUMIN 2.4* 2.4* 2.2* 2.5*      PANC  Recent Labs   Lab 02/24/20  0645   LIPASE 77   AMYLASE 53

## 2020-02-28 NOTE — PLAN OF CARE
5042-1314: Afebrile. VSS on RA. A/Ox4 but forgetful. Bed alarm in place overnight, but pt was up independently during the daytime with family at bedside.  [place]; wears hearing aid on the right side.Tolerating regular diet. Denies pain or nausea today. Voiding WNL. Reports last BM 2/26. Requires help ordering meals. Patient is POD#1 s/p ERCP. Per GI team patient will need monitoring today and would likely discharge tomorrow. Continue with POC.    Danita Calhoun RN on 2/28/2020 at 2:49 PM

## 2020-02-28 NOTE — ADDENDUM NOTE
Addendum  created 02/28/20 0558 by Paige Valerio MD    Clinical Note Signed, Review and Sign - Ready for Procedure, Review and Sign - Signed

## 2020-02-28 NOTE — PLAN OF CARE
Time: 4061-6217    Reason for admission: Elevated LFTs, postoperative n/v  Vitals: Hypertensive, otherwise VSS on RA  Activity: SBA, bed alarm in place for safety  Pain: Denies  Neuro:  Disoriented to time and situation. Alert but confused. Speech clear.   Cardiac:  No acute issues.  Respiratory: No acute issues.   GI/:  Voids without difficulty. LBM 2/27/20.   Diet:  Clear liquid, tolerating well. Denies nausea.  Lines: R PIV SL. L PIV SL bleeding at site but pt denies pain and flushes well.   Skin/Wounds: Warm, dry, intact  Labs/imaging: . . Alk phos 984. Lytes WNL.      New changes this shift: Pt resting overnight. Capno in place. Pt up to bathroom twice overnight. Denies pain/nausea.    Plan: Discharge today pending pain management/tolerating PO medications, abx plan established and safe disposition plan/TCU bed available.

## 2020-02-28 NOTE — DISCHARGE SUMMARY
[facility], [place]  Hospitalist Discharge Summary       Date of Admission:  2/24/2020  Date of Discharge:  2/29/2020  Discharging Provider: Tia Antonio PA-C/Dr. Bell  Discharge Team: Hospitalist Service, Gold 7    Discharge Diagnoses   #Sepsis 2/2 presumed acute cholangitis due to procedure  #Cholangiocarcinoma with biliary stricture, s/p ERCP w stent exchange (2/24 & 02/27)  #HTN  #Chronic anemia, stable  #GERD, stable  #Hyponatremia, resolved    Follow-ups Needed After Discharge   Follow-up Appointments     Adult [facility] Follow-up and recommended labs and tests      Follow up with primary care provider, Dmitriy Wei, within 7 days to   evaluate medication change and for hospital follow- up.  The following   labs/tests are recommended: BMP, CBC.    Follow up with GI as directed for continued management of stenting.     Appointments on [place] and/or [facility] (with [facility] or [facility]   provider or service). Call [phone] if you haven't heard regarding   these appointments within 7 days of discharge.             Unresulted Labs Ordered in the Past 30 Days of this Admission     Date and Time Order Name Status Description    2/25/2020 0831 Blood culture Preliminary     2/25/2020 0831 Blood culture Preliminary       These results will be followed up by PCP    Discharge Disposition   Discharged to home w/ family assist  Condition at discharge: Stable    Hospital Course   Tim Cazares is a 85 year old male with history of [place] tumor cholangiocarcinoma c/b biliary stricture s/p biliary stents, HTN, GERD, and CKD admitted to ED obs on 2/24 following outpatient ERCP and biliary stent exchange due to nausea, vomiting, and diarrhea. Patient developed sepsis with WBC 16.6 and Temp 101.2 F, therefore transferred to inpatient medicine for further management. Repeat ERCP 02/27.      #Sepsis, 2/2 presumed acute cholangitis post procedure: S/p ERCP with biliary stent  exchange 2/24. Admitted to ED obs late evening 02/24 due to N/V/D post-procedure. Developed sepsis 02/25 w/ fever, leukocytosis and elevated LFT's c/w biliary obstruction. No abdominal pain & exam benign. Blood cultures 02/25 remained w/ NGTD. Repeat ERCP 02/27 w/ stent removal, sludge removed from biliary tree, dilation of right anterior duct, plastic stent placement in right anterior, right posterior and left intrahepatic duct. LFTs remained stable w/ improvement in t bili, stable elevation otherwise. On discharge, Continue antibiotics with cipro flagyl to complete a 7 day course (confirmed with GI). No pain or nausea post procedure, & patient was able to advance diet as tolerated. Follow up with GI as directed for continued management of biliary stenosis & stent exchange/placement.      #Cholangiocarcinoma with biliary stricture, s/p ERCP w stent exchange (2/24): Diagnosed in 2018. Completed chemoradiation tx in May 2019. Currently monitoring w surveillance CT's. Biliary stricture managed with frequent ERCP and stent exchanges, 02/24 and 02/27 (see above for procedure details). Currently no acute oncologic concerns. Follow up with OP oncology as directed for management of malignancy and GI for continued management of biliary stents.      #Hyponatremia, resolved: Suspected mild volume depletion in setting of NPO status and GI fluid loss and resolved w/ gentle IVF hydration. Continue to follow as OP in 1-2 weeks.      #Chronic anemia: Baseline Hgb 10-12, current hgb of 11.6. No evidence of active bleeding. Continue to monitor as OP.       #HTN: PTA regimen includes losartan 100mg daily, amlodipine 5mg daily, lasix 20mg daily, metoprolol 50mg BID. All meds resumed this admission w/ exception to lasix. OK to resume on discharge. Follow up with PCP for continued management of HTN. Lasix held on admission. BP mildly elevated.      #GERD: Stable. Continued PTA ranitidine and protonix.    Consultations This Hospital Stay    GI PANCREATICOBILIARY ADULT IP CONSULT  VASCULAR ACCESS CARE ADULT IP CONSULT    Code Status   Full Code    Time Spent on this Encounter   I, Tia Antonio PA-C, personally saw the patient today and spent greater than 30 minutes discharging this patient.       Tia Antonio PA-C  [facility], [place]  ______________________________________________________________________    Physical Exam   Vital Signs: Temp: 97.3  F (36.3  C) Temp src: Oral BP: (!) 140/66   Heart Rate: 64 Resp: 16 SpO2: 98 % O2 Device: None (Room air)    Weight: 131 lbs 9.6 oz  GENERAL: Alert, lying comfortably in bed.   HEENT: Anicteric sclera. Mucous membranes moist and without lesions.   CV: RRR. S1, S2. No murmurs appreciated.   RESPIRATORY: Effort normal on RA. Lungs CTAB with no wheezing, rales, rhonchi.   GI: Abdomen soft and non distended, bowel sounds present. No tenderness, rebound, guarding.   MUSCULOSKELETAL: Moves all extremities.   NEUROLOGICAL: No focal deficits.   EXTREMITIES: No peripheral edema.   SKIN: No jaundice. No rashes.          Primary Care Physician   Dmitriy Wei    Discharge Orders      Reason for your hospital stay    Dear Tim,    Your were hospitalized at [facility] with nausea/vomiting and abdominal pain post procedure, the next day you developed sepsis (fever, elevated white blood cell count and abnormal liver function tests) with concern for obstruction in your biliary tract and treated with antibiotics. Over your hospitalization your condition improved and today you are ready to be discharged home. If you continue antibiotic therapy you should continue to improve but if you develop fever, shortness of breath, light headedness, chest pain or other medical concerns please seek medical attention.     We are suggesting the following medication changes:  1. Cipro and flagyl as directed to complete a 7 day course    Please set up an  appointment with:  1. Please see your primary care provider in the next 2 weeks to go over the hospitalization, med changes, labs  2. Follow up with GI within 1-2 weeks for consideration of further stenting    It was a pleasure meeting you. Thank you for allowing me and my team the privilege of caring for you today. You are the reason we are here, and I truly hope we provided you with the excellent service you deserve. Please let us know if there is anything else we can do for you so that we can be sure you are leaving completely satisfied with your care experience.    Your hospital unit at the time of discharge is [unfilled] so if you have any questions please call the hospital at [phone] and ask to talk to a nurse on [unfilled].    Take care!  Tia Antonio PA-C     Adult [facility] Follow-up and recommended labs and tests    Follow up with primary care provider, Dmitriy Wei, within 7 days to evaluate medication change and for hospital follow- up.  The following labs/tests are recommended: BMP, CBC.    Follow up with GI as directed for continued management of stenting.     Appointments on [place] and/or [facility] (with [facility] or [facility] provider or service). Call [phone] if you haven't heard regarding these appointments within 7 days of discharge.     Activity    Your activity upon discharge: activity as tolerated and no driving if on narcotic pain medications     When to contact your care team    Call your primary doctor if you have any of the following: temperature greater than 101 or less than 96,  increased shortness of breath, increased swelling, increased pain or inability to tolerate oral intake.    Call GI if any worsening abdominal pain, fevers/chills.     Full Code     Diet    Follow this diet upon discharge: Orders Placed This Encounter      Advance diet as tolerated to regular diet       Significant Results and Procedures   Most Recent 3 CBC's:  Recent Labs   Lab Test 02/28/20  0613  02/27/20  0622 02/26/20  0813   WBC 6.4 8.2 9.6   HGB 11.6* 11.1* 10.2*   MCV 97 99 101*    289 261     Most Recent 3 BMP's:  Recent Labs   Lab Test 02/29/20  0554 02/28/20  0613 02/27/20  0622    134 133   POTASSIUM 3.8 4.0 3.7   CHLORIDE 107 105 102   CO2 24 22 24   BUN 17 15 11   CR 0.87 0.82 0.83   ANIONGAP 8 7 7   LUZMARIA 8.8 9.1 8.8   * 145* 107*     Most Recent 2 LFT's:  Recent Labs   Lab Test 02/29/20  0554 02/28/20  0613   * 146*   * 170*   ALKPHOS 908* 996*   BILITOTAL 3.4* 5.3*     Most Recent 3 INR's:No lab results found.,   Results for orders placed or performed during the hospital encounter of 02/24/20   XR Abdomen Port 1 View    Narrative    Exam: XR ABDOMEN PORT 1 VW, 2/25/2020 9:17 AM    Indication: Biliary stents, fevers    Comparison: 2/24/2020, 11/25/2019    Findings:   A single supine AP view of the abdomen was obtained. Stable position  of the right and left biliary stents. Cholecystectomy clips project  over the right upper quadrant. Nonobstructive bowel gas pattern  without pneumatosis or portal venous gas. The lung bases are clear.  Degenerative changes in the lumbar spine. No acute osseous  abnormalities. Old lower right-sided rib fracture.      Impression    Impression:   Unchanged position of the biliary stents. Nonobstructive bowel gas  pattern.    CARLOS BUSTILLOS MD   XR Surgery ASHLYN Fluoro G/T 5 Min w Stills    Narrative    This exam was marked as non-reportable because it will not be read by a   radiologist or a [place] non-radiologist provider.                   Discharge Medications   Current Discharge Medication List      START taking these medications    Details   ciprofloxacin (CIPRO) 500 MG tablet Take 1 tablet (500 mg) by mouth every 12 hours  Qty: 8 tablet, Refills: 0    Associated Diagnoses: Ascending cholangitis      metroNIDAZOLE (FLAGYL) 500 MG tablet Take 1 tablet (500 mg) by mouth 3 times daily  Qty: 13 tablet, Refills: 0    Associated  Diagnoses: Ascending cholangitis         CONTINUE these medications which have NOT CHANGED    Details   acetaminophen (TYLENOL) 500 MG tablet Take 500 mg by mouth every 6 hours as needed       amLODIPine (NORVASC) 5 MG tablet Take 5 mg by mouth daily       furosemide (LASIX) 20 MG tablet Take 20 mg by mouth M - W - friday       losartan (COZAAR) 100 MG tablet Take 100 mg by mouth daily       metoprolol succinate ER (TOPROL-XL) 100 MG 24 hr tablet Take 50 mg by mouth 2 times daily       pantoprazole (PROTONIX) 40 MG EC tablet Take 40 mg by mouth every morning (before breakfast)       Probiotic Product (PROBIOTIC DAILY PO) Take 1 tablet by mouth daily       ranitidine (ZANTAC) 300 MG tablet Take 300 mg by mouth At Bedtime            Allergies   Allergies   Allergen Reactions     Hydralazine Other (See Comments)     Irregular heart rate  Tingling in hands and feet  Sore throat     Penicillin G      Strawberries [Strawberry]      Sulfa Drugs      Isosorbide Other (See Comments)

## 2020-02-28 NOTE — PROGRESS NOTES
[facility], [facility] Progress Note - Hospitalist Service, Gold 7       Date of Admission:  2/24/2020  Assessment & Plan      Tim Cazares is an 85 year old male with past medical history significant for Kl[place] tumor cholangiocarcinoma c/b biliary stricture s/p biliary stents, HTN, GERD, and CKD admitted to ED obs on 2/24 following outpatient ERCP and biliary stent exchange due to nausea, vomiting, and diarrhea. Patient developed sepsis with WBC 16.6 and Temp 101.2 F, therefore transferred to inpatient medicine for further management.    # Sepsis, probable acute cholangitis  # Transaminitis  # Hyperbilirubinemia    S/p ERCP with biliary stent exchange 2/24.  Admitted to ED obs late evening 2/24 due to N/V/D post-procedure. Developed sepsis 02/25 w/ fever, leukocytosis and elevated LFT's c/w biliary obstruction. Tbili relatively unchanged and remains elevated, AP elevated today 996 (984). Vitals stable, afebrile. Continues to be without abdominal pain & exam benign. No other clear etiology (UA w/o e/o infection, no pulm symptoms). Leukocytosis resolved.  underwent repeat ERCP on 2/27 with third stent placed.     - GI following, appreciate recs - will defer discharge until tomorrow and repeat LFTs in AM to ensure downtrend   - ADAT, please notify IM if any nausea, vomiting, or abdominal pain   - Continue cipro/flagyl for 7 days total per GI   - Blood cultures x2 w/ NGTD  - No AC x 72 hr from ERCP on 2/27   - Needs follow up with Dr. Barraza as previously scheduled in 10 weeks      # Cholangiocarcinoma with biliary stricture, s/p ERCP w stent exchange (2/24) - Diagnosed in 2018. Completed chemoradiation tx in May 2019. Currently monitoring w surveillance CT's. Biliary stricture managed with frequent ERCP and stent exchanges. Currently no acute oncologic concerns.   - GI consulted as above for biliary stents  - Follow up with OP oncology as directed     # Hyponatremia -  Resolved. Na wnl today.      # Chronic anemia - Baseline Hgb 10-12, current hgb of 11.6. No evidence of active bleeding. Can trend in AM.      # HTN - PTA regimen includes losartan 100mg daily, amlodipine 5mg daily, lasix 20mg daily, metoprolol 50mg BID. Lasix held on admission. BP mildly elevated.   - OK to continue amlodipine and metoprolol  - Will consider resuming Losartan at discharge   - Continue to hold lasix  - Monitor for hypotension      #GERD - Stable. Continue PTA ranitidine and protonix.      Diet: Diet  Regular Diet Adult    DVT Prophylaxis: Pneumatic Compression Devices, ambulate   Bender Catheter: not present  Code Status: Full Code      Disposition Plan   Expected discharge: Tomorrow, recommended to prior living arrangement once tolerating PO diet without difficulty and Tbili and Alk Phos levels downtrending.  Entered: ARMAAN Rod CNP 02/28/2020, 9:35 AM       The patient's care was discussed with the Attending Physician, Dr. Destinee Bell, Dr. Andrade (GI Panc/Bili), and pt and family.    ARMAAN Rod CNP  Hospitalist Service, [address]  Pager: [phone]  Please see sticky note for cross cover information  ______________________________________________________________________    Interval History   iTm is resting in bed.  He ate breakfast and denies pain w/ eating, new abdominal pain, nausea, or vomiting.  No fevers or chills, feels better today.      Data reviewed today: I reviewed all medications, new labs and imaging results over the last 24 hours.    Physical Exam   Vital Signs: Temp: 97.5  F (36.4  C) Temp src: Oral BP: 138/77 Pulse: 84 Heart Rate: 93 Resp: 16 SpO2: 98 % O2 Device: None (Room air) Oxygen Delivery: 1 LPM  Weight: 130 lbs 4.67 oz    GENERAL: Alert and oriented x 3. Well nourished, well developed.  No acute distress.    HEENT: Normocephalic, atraumatic. Pale sclera. Mucous membranes moist.   CV: RRR. S1, S2. No  murmurs appreciated.   RESPIRATORY: Effort normal on room air. Lungs CTAB with no wheezing, rales, or rhonchi.   GI: Abdomen soft and non distended, bowel sounds present x all 4 quadrants. No tenderness, rebound, or guarding.   NEUROLOGICAL: No focal deficits. Follows commands.  Strength equal in upper and lower extremities.   MUSCULOSKELETAL: No joint swelling or tenderness. Moves all extremities.   EXTREMITIES: No gross deformities. No peripheral edema.   SKIN: Grossly warm, dry, and intact. Mild jaundice. No rashes.       Data   Recent Labs   Lab 02/28/20  0613 02/27/20  0622 02/26/20  0813  02/24/20  0645   WBC 6.4 8.2 9.6   < >  --    HGB 11.6* 11.1* 10.2*   < >  --    MCV 97 99 101*   < >  --     289 261   < >  --     133 133   < > 135   POTASSIUM 4.0 3.7 3.8   < > 3.7   CHLORIDE 105 102 103   < > 104   CO2 22 24 25   < > 25   BUN 15 11 11   < > 14   CR 0.82 0.83 0.83   < > 0.89   ANIONGAP 7 7 5   < > 6   LUZMARIA 9.1 8.8 8.4*   < > 9.5   * 107* 95   < > 102*   ALBUMIN 2.4* 2.4* 2.2*   < >  --    PROTTOTAL 6.6* 6.3* 5.8*   < >  --    BILITOTAL 5.3* 5.8* 5.2*   < >  --    ALKPHOS 996* 984* 716*   < >  --    * 187* 149*   < >  --    * 218* 146*   < >  --    LIPASE  --   --   --   --  77    < > = values in this interval not displayed.     Recent Results (from the past 24 hour(s))   XR Surgery ASHLYN Fluoro G/T 5 Min w Stills    Narrative    This exam was marked as non-reportable because it will not be read by a   radiologist or a [place] non-radiologist provider.               Medications     - MEDICATION INSTRUCTIONS -         amLODIPine  5 mg Oral Daily     ciprofloxacin  500 mg Oral Q12H MINNA     famotidine  40 mg Oral At Bedtime     lactobacillus rhamnosus (GG)  1 capsule Oral Daily     metoprolol succinate ER  50 mg Oral BID     metroNIDAZOLE  500 mg Oral TID     pantoprazole  40 mg Oral QAM AC

## 2020-02-29 VITALS
TEMPERATURE: 97.3 F | SYSTOLIC BLOOD PRESSURE: 140 MMHG | DIASTOLIC BLOOD PRESSURE: 66 MMHG | HEART RATE: 84 BPM | HEIGHT: 66 IN | WEIGHT: 131.6 LBS | BODY MASS INDEX: 21.15 KG/M2 | RESPIRATION RATE: 16 BRPM | OXYGEN SATURATION: 98 %

## 2020-02-29 LAB
ALBUMIN SERPL-MCNC: 2.4 G/DL (ref 3.4–5)
ALP SERPL-CCNC: 908 U/L (ref 40–150)
ALT SERPL W P-5'-P-CCNC: 154 U/L (ref 0–70)
ANION GAP SERPL CALCULATED.3IONS-SCNC: 8 MMOL/L (ref 3–14)
AST SERPL W P-5'-P-CCNC: 125 U/L (ref 0–45)
BILIRUB SERPL-MCNC: 3.4 MG/DL (ref 0.2–1.3)
BUN SERPL-MCNC: 17 MG/DL (ref 7–30)
CALCIUM SERPL-MCNC: 8.8 MG/DL (ref 8.5–10.1)
CHLORIDE SERPL-SCNC: 107 MMOL/L (ref 94–109)
CO2 SERPL-SCNC: 24 MMOL/L (ref 20–32)
CREAT SERPL-MCNC: 0.87 MG/DL (ref 0.66–1.25)
ERCP: NORMAL
GFR SERPL CREATININE-BSD FRML MDRD: 80 ML/MIN/{1.73_M2}
GLUCOSE SERPL-MCNC: 111 MG/DL (ref 70–99)
POTASSIUM SERPL-SCNC: 3.8 MMOL/L (ref 3.4–5.3)
PROT SERPL-MCNC: 6.2 G/DL (ref 6.8–8.8)
SODIUM SERPL-SCNC: 139 MMOL/L (ref 133–144)

## 2020-02-29 PROCEDURE — 25000132 ZZH RX MED GY IP 250 OP 250 PS 637: Mod: GY | Performed by: PHYSICIAN ASSISTANT

## 2020-02-29 PROCEDURE — 36415 COLL VENOUS BLD VENIPUNCTURE: CPT | Performed by: NURSE PRACTITIONER

## 2020-02-29 PROCEDURE — 99207 ZZC APP CREDIT; MD BILLING SHARED VISIT: CPT | Performed by: PHYSICIAN ASSISTANT

## 2020-02-29 PROCEDURE — 80053 COMPREHEN METABOLIC PANEL: CPT | Performed by: NURSE PRACTITIONER

## 2020-02-29 PROCEDURE — 25000132 ZZH RX MED GY IP 250 OP 250 PS 637: Mod: GY | Performed by: NURSE PRACTITIONER

## 2020-02-29 PROCEDURE — 99238 HOSP IP/OBS DSCHRG MGMT 30/<: CPT | Performed by: INTERNAL MEDICINE

## 2020-02-29 RX ORDER — CIPROFLOXACIN 500 MG/1
500 TABLET, FILM COATED ORAL EVERY 12 HOURS
Qty: 8 TABLET | Refills: 0 | Status: SHIPPED | OUTPATIENT
Start: 2020-02-29 | End: 2020-02-29

## 2020-02-29 RX ORDER — CIPROFLOXACIN 500 MG/1
500 TABLET, FILM COATED ORAL EVERY 12 HOURS
Qty: 7 TABLET | Refills: 0 | Status: SHIPPED | OUTPATIENT
Start: 2020-02-29 | End: 2020-02-29

## 2020-02-29 RX ORDER — METRONIDAZOLE 500 MG/1
500 TABLET ORAL 3 TIMES DAILY
Qty: 13 TABLET | Refills: 0 | Status: SHIPPED | OUTPATIENT
Start: 2020-02-29 | End: 2020-02-29

## 2020-02-29 RX ORDER — METRONIDAZOLE 500 MG/1
500 TABLET ORAL 3 TIMES DAILY
Qty: 10 TABLET | Refills: 0 | Status: SHIPPED | OUTPATIENT
Start: 2020-02-29 | End: 2020-02-29

## 2020-02-29 RX ORDER — CIPROFLOXACIN 500 MG/1
500 TABLET, FILM COATED ORAL EVERY 12 HOURS
Qty: 10 TABLET | Refills: 0 | Status: SHIPPED | OUTPATIENT
Start: 2020-02-29 | End: 2020-05-08

## 2020-02-29 RX ORDER — METRONIDAZOLE 500 MG/1
500 TABLET ORAL 3 TIMES DAILY
Qty: 17 TABLET | Refills: 0 | Status: SHIPPED | OUTPATIENT
Start: 2020-02-29 | End: 2020-05-08

## 2020-02-29 RX ADMIN — METRONIDAZOLE 500 MG: 500 TABLET ORAL at 07:43

## 2020-02-29 RX ADMIN — AMLODIPINE BESYLATE 5 MG: 5 TABLET ORAL at 07:42

## 2020-02-29 RX ADMIN — CIPROFLOXACIN HYDROCHLORIDE 500 MG: 500 TABLET, FILM COATED ORAL at 07:42

## 2020-02-29 RX ADMIN — PANTOPRAZOLE SODIUM 40 MG: 40 TABLET, DELAYED RELEASE ORAL at 07:43

## 2020-02-29 RX ADMIN — METOPROLOL SUCCINATE 50 MG: 50 TABLET, EXTENDED RELEASE ORAL at 07:43

## 2020-02-29 RX ADMIN — Medication 1 CAPSULE: at 07:42

## 2020-02-29 ASSESSMENT — ACTIVITIES OF DAILY LIVING (ADL)
ADLS_ACUITY_SCORE: 13

## 2020-02-29 NOTE — PLAN OF CARE
"Time: 1900-0730    Reason for admission/Dx:   Elevated liver function tests [R94.5]     /52 (BP Location: Left arm)   Pulse 84   Temp 97.3  F (36.3  C) (Oral)   Resp 16   Ht 1.676 m (5' 6\")   Wt 59.7 kg (131 lb 9.6 oz)   SpO2 99%   BMI 21.24 kg/m      Shift changes:  VSS, on RA. AOx4, [place], SBA. Post-op day 1 from ERCP.  Pt has longstanding hx of ERCPs for frequent stent replacements.  Denies abdominal pain and nausea. Tolerating regular diet. LBM today 2/28 2x.  Voids, without difficulty.  Family at bedside visiting. Holding DOACs. Cipro and Flagyl PO abx regimen maintained. RPIV SL. Slept well overnight.     Plan: Trend LFTs in AM. Probable discharge to home 2/29, pending lab studies.     "

## 2020-03-01 ENCOUNTER — PATIENT OUTREACH (OUTPATIENT)
Dept: CARE COORDINATION | Facility: CLINIC | Age: 85
End: 2020-03-01

## 2020-03-02 ENCOUNTER — PATIENT OUTREACH (OUTPATIENT)
Dept: GASTROENTEROLOGY | Facility: CLINIC | Age: 85
End: 2020-03-02

## 2020-03-02 ENCOUNTER — PREP FOR PROCEDURE (OUTPATIENT)
Dept: GASTROENTEROLOGY | Facility: CLINIC | Age: 85
End: 2020-03-02

## 2020-03-02 DIAGNOSIS — C22.1 CHOLANGIOCARCINOMA (H): Primary | ICD-10-CM

## 2020-03-02 LAB
BACTERIA SPEC CULT: NO GROWTH
BACTERIA SPEC CULT: NO GROWTH
SPECIMEN SOURCE: NORMAL
SPECIMEN SOURCE: NORMAL

## 2020-03-02 NOTE — PROGRESS NOTES
Post ERCP (2/27/2020) with Dr. Bella: Follow-up    Post procedure recommendations:   Could you please assist in scheduling:     Procedure/Appointment/Imaging:ERCP   Physician: Madi   Timing: 10 weeks     Dx: Cholangioca    Orders placed: case request as above    Patient states: unable to reach- mobile will not take message, home phone disconnected    Clinic contact and scheduling numbers verified for future questions/concerns.    Valencia Tran RN Care Coordinator

## 2020-03-02 NOTE — TELEPHONE ENCOUNTER
"[facility]: Post-Discharge Note  SITUATION                                                      Admission:    Admission Date: 02/24/20   Reason for Admission: Sepsis 2/2 presumed acute cholangitis post procedure  Discharge:   Discharge Date: 02/29/20  Discharge Diagnosis: Sepsis 2/2 presumed acute cholangitis post procedure  Discharge Service: Hospitalist     BACKGROUND                                                      Tim Cazares is a 85 year old male with history of [place] tumor cholangiocarcinoma c/b biliary stricture s/p biliary stents, HTN, GERD, and CKD admitted to ED obs on 2/24 following outpatient ERCP and biliary stent exchange due to nausea, vomiting, and diarrhea. Patient developed sepsis with WBC 16.6 and Temp 101.2 F, therefore transferred to inpatient medicine for further management. Repeat ERCP 02/27.     ASSESSMENT      Discharge Assessment  Patient reports symptoms are: Improved(Patient's wife reports he is \"tired but doing well\". They are leaving to head to the airport now. )  Does the patient have all of their medications?: Yes  Does patient know what their new medications are for?: Yes  Does patient have a follow-up appointment scheduled?: No  Does patient have any other questions or concerns?: No    Post-op  Did the patient have surgery or a procedure: Yes  Fever: No  Chills: No  Eating & Drinking: eating and drinking without complaints/concerns  PO Intake: regular diet  Bowel Function: normal  Urinary Status: voiding without complaint/concerns    PLAN                                                      Outpatient Plan:      Follow up with primary care provider, Dmitriy Wei, within 7 days to evaluate medication change and for hospital follow- up.  The following labs/tests are recommended: BMP, CBC.    Follow up with GI as directed for continued management of stenting.     No future appointments.        Saida Maxwell CMA                  "

## 2020-03-05 ENCOUNTER — PATIENT OUTREACH (OUTPATIENT)
Dept: GASTROENTEROLOGY | Facility: CLINIC | Age: 85
End: 2020-03-05

## 2020-03-05 ENCOUNTER — PREP FOR PROCEDURE (OUTPATIENT)
Dept: GASTROENTEROLOGY | Facility: CLINIC | Age: 85
End: 2020-03-05

## 2020-03-05 DIAGNOSIS — C22.1 CHOLANGIOCARCINOMA (H): Primary | ICD-10-CM

## 2020-03-05 NOTE — PROGRESS NOTES
Post ERCP (2/27/2020) with Dr. Curran Follow-up    Post procedure recommendations:   Return to OR for stent exchange at ERCP in 10 weeks,  at which time would consider radiofrequency ablation.     Orders placed:   Please assist in scheduling:     Procedure/Imaging/Clinic: ERCP w/ possible RFT  Physician: Dr. Barraza  Timing: 10 weeks  Procedure length:75 minutes  Anesthesia:general  Dx: cholangiocarcinoma  Location: [facility]    Patient states : feeling fine, no concerning sypmtoms, is back in [place]    Clinic contact and scheduling numbers verified for future questions/concerns.    Valencia Tran, RN Care Coordinator

## 2020-03-12 ENCOUNTER — TELEPHONE (OUTPATIENT)
Dept: GASTROENTEROLOGY | Facility: CLINIC | Age: 85
End: 2020-03-12

## 2020-03-12 PROBLEM — C22.1 CHOLANGIOCARCINOMA (H): Status: ACTIVE | Noted: 2020-03-12

## 2020-03-12 NOTE — TELEPHONE ENCOUNTER
Spoke to patients wife in regards to scheduled procedure. Informed  Brea the patient is scheduled with Dr. Barraza on 5/12/2020. Informed Brea the patient will need an updated pre-op physical within 30 days of his procedure. She stated the patient is going to have this done locally with his PCP. Informed her the patient will need a  and someone to monitor him for 24 hours after the procedure. Informed patient all scheduling details will be sent to the address listed on Epic. Address confirmed on this call.

## 2020-03-26 ENCOUNTER — PATIENT OUTREACH (OUTPATIENT)
Dept: GASTROENTEROLOGY | Facility: CLINIC | Age: 85
End: 2020-03-26

## 2020-03-26 NOTE — PROGRESS NOTES
"Patient wife called stating patient having symptoms again, they're still in [place]. Running a fever/chills, Tuesday woke up 99.2, later 102.3, gave tyelnol, down to 99. Yesterday ok until evenings, 100 degrees at 9pm.  Is 99.7 again today. \"Low stomach cramps\", felt like this before the last stent change, elastic hurts his stomach. No cough or other concerning or new symptoms.     Is flying back on Sunday into [place], wondering about options.    Valencia Tran, RN Care Coordinator    "

## 2020-03-30 ENCOUNTER — TRANSFERRED RECORDS (OUTPATIENT)
Dept: HEALTH INFORMATION MANAGEMENT | Facility: CLINIC | Age: 85
End: 2020-03-30

## 2020-03-30 ENCOUNTER — PATIENT OUTREACH (OUTPATIENT)
Dept: GASTROENTEROLOGY | Facility: CLINIC | Age: 85
End: 2020-03-30

## 2020-03-30 DIAGNOSIS — C22.1 CHOLANGIOCARCINOMA (H): Primary | ICD-10-CM

## 2020-03-30 NOTE — PROGRESS NOTES
Called to check on patient, still having intermittent fevers, treating with tylenol, not eating, gets bad cramps when he eats.    Advising hepatic panel. Faxed to [facility], oncologist to draw locally today to determine next steps. [phone]    Erin Worthington, [facility],  Will be new PCP to get preop as needed.    Valencia Tran, RN Care Coordinator

## 2020-04-01 ENCOUNTER — PATIENT OUTREACH (OUTPATIENT)
Dept: GASTROENTEROLOGY | Facility: CLINIC | Age: 85
End: 2020-04-01

## 2020-04-01 NOTE — PROGRESS NOTES
Per Dr. Barraza  These numbers suggest another etiology for his symptoms   He should be evaluated for COVID and other nonGI related issues   I would hold off for now on scheduling   Bravo     Called to talk to wife w/ update. Having sweats again, not eating.  Advised of above, encouraging follow up with new PCP or oncologist. Suggested symptoms could be disease progression also. She asked that I call her daughter Tia. Called Tia, left message.    Valencia Tran RN Care Coordinator

## 2020-05-01 DIAGNOSIS — Z11.59 ENCOUNTER FOR SCREENING FOR OTHER VIRAL DISEASES: Primary | ICD-10-CM

## 2020-05-07 ENCOUNTER — TELEPHONE (OUTPATIENT)
Dept: GASTROENTEROLOGY | Facility: CLINIC | Age: 85
End: 2020-05-07

## 2020-05-07 NOTE — TELEPHONE ENCOUNTER
[facility] Call Center    Phone Message    May a detailed message be left on voicemail: yes     Reason for Call: Patient's wife Brea called requesting additional information regarding patient's procedure scheduled for 5/12 with Dr. Barraza. Brea would like to confirm if the procedure is still going to happen.  Please call Brea at [phone]    Action Taken: Message routed to:  [facility]    Travel Screening: Not Applicable

## 2020-05-08 ENCOUNTER — TELEPHONE (OUTPATIENT)
Dept: GASTROENTEROLOGY | Facility: CLINIC | Age: 85
End: 2020-05-08

## 2020-05-08 NOTE — TELEPHONE ENCOUNTER
Returned call to wife, confirmed procedure on as scheduled, provided [facility] testing scheduling number. Wife reports patient back on chemo, stents fine for now.    Will keep in touch regarding scheduling/    Valencia Tran RN Care Coordinator

## 2020-05-08 NOTE — TELEPHONE ENCOUNTER
Got a call from [facility] stating they can try to get patient in today for Covid Screening, tests taking 3-4 days at [facility] or [facility]. Results will [facility]     Valencia Tran RN Care Coordinator

## 2020-05-09 ENCOUNTER — TRANSFERRED RECORDS (OUTPATIENT)
Dept: HEALTH INFORMATION MANAGEMENT | Facility: CLINIC | Age: 85
End: 2020-05-09

## 2020-05-12 ENCOUNTER — APPOINTMENT (OUTPATIENT)
Dept: GENERAL RADIOLOGY | Facility: CLINIC | Age: 85
End: 2020-05-12
Attending: INTERNAL MEDICINE
Payer: MEDICARE

## 2020-05-12 ENCOUNTER — ANESTHESIA (OUTPATIENT)
Dept: SURGERY | Facility: CLINIC | Age: 85
End: 2020-05-12
Payer: MEDICARE

## 2020-05-12 ENCOUNTER — HOSPITAL ENCOUNTER (OUTPATIENT)
Facility: CLINIC | Age: 85
Discharge: HOME | End: 2020-05-12
Attending: INTERNAL MEDICINE | Admitting: INTERNAL MEDICINE
Payer: MEDICARE

## 2020-05-12 ENCOUNTER — ANESTHESIA EVENT (OUTPATIENT)
Dept: SURGERY | Facility: CLINIC | Age: 85
End: 2020-05-12
Payer: MEDICARE

## 2020-05-12 VITALS
SYSTOLIC BLOOD PRESSURE: 140 MMHG | RESPIRATION RATE: 16 BRPM | TEMPERATURE: 97.7 F | OXYGEN SATURATION: 100 % | WEIGHT: 119.71 LBS | DIASTOLIC BLOOD PRESSURE: 62 MMHG | HEART RATE: 56 BPM | BODY MASS INDEX: 19.24 KG/M2 | HEIGHT: 66 IN

## 2020-05-12 DIAGNOSIS — C22.1 CHOLANGIOCARCINOMA (H): ICD-10-CM

## 2020-05-12 LAB
ALBUMIN SERPL-MCNC: 2.8 G/DL (ref 3.4–5)
ALP SERPL-CCNC: 284 U/L (ref 40–150)
ALT SERPL W P-5'-P-CCNC: 43 U/L (ref 0–70)
AMYLASE SERPL-CCNC: 44 U/L (ref 30–110)
ANION GAP SERPL CALCULATED.3IONS-SCNC: 9 MMOL/L (ref 3–14)
AST SERPL W P-5'-P-CCNC: 28 U/L (ref 0–45)
BILIRUB SERPL-MCNC: 0.5 MG/DL (ref 0.2–1.3)
BUN SERPL-MCNC: 12 MG/DL (ref 7–30)
CALCIUM SERPL-MCNC: 9 MG/DL (ref 8.5–10.1)
CHLORIDE SERPL-SCNC: 97 MMOL/L (ref 94–109)
CO2 SERPL-SCNC: 27 MMOL/L (ref 20–32)
CREAT SERPL-MCNC: 0.86 MG/DL (ref 0.66–1.25)
ERCP: NORMAL
ERYTHROCYTE [DISTWIDTH] IN BLOOD BY AUTOMATED COUNT: 13.9 % (ref 10–15)
GFR SERPL CREATININE-BSD FRML MDRD: 80 ML/MIN/{1.73_M2}
GLUCOSE BLDC GLUCOMTR-MCNC: 88 MG/DL (ref 70–99)
GLUCOSE SERPL-MCNC: 106 MG/DL (ref 70–99)
HCT VFR BLD AUTO: 30.2 % (ref 40–53)
HGB BLD-MCNC: 10.1 G/DL (ref 13.3–17.7)
LIPASE SERPL-CCNC: 47 U/L (ref 73–393)
MCH RBC QN AUTO: 32 PG (ref 26.5–33)
MCHC RBC AUTO-ENTMCNC: 33.4 G/DL (ref 31.5–36.5)
MCV RBC AUTO: 96 FL (ref 78–100)
PLATELET # BLD AUTO: 246 10E9/L (ref 150–450)
POTASSIUM SERPL-SCNC: 3.5 MMOL/L (ref 3.4–5.3)
PROT SERPL-MCNC: 6.7 G/DL (ref 6.8–8.8)
RBC # BLD AUTO: 3.16 10E12/L (ref 4.4–5.9)
SODIUM SERPL-SCNC: 132 MMOL/L (ref 133–144)
WBC # BLD AUTO: 4.4 10E9/L (ref 4–11)

## 2020-05-12 PROCEDURE — 85027 COMPLETE CBC AUTOMATED: CPT | Performed by: INTERNAL MEDICINE

## 2020-05-12 PROCEDURE — 80053 COMPREHEN METABOLIC PANEL: CPT | Performed by: INTERNAL MEDICINE

## 2020-05-12 PROCEDURE — 40000170 ZZH STATISTIC PRE-PROCEDURE ASSESSMENT II: Performed by: INTERNAL MEDICINE

## 2020-05-12 PROCEDURE — 25800030 ZZH RX IP 258 OP 636: Performed by: ANESTHESIOLOGY

## 2020-05-12 PROCEDURE — C1876 STENT, NON-COA/NON-COV W/DEL: HCPCS | Performed by: INTERNAL MEDICINE

## 2020-05-12 PROCEDURE — 83690 ASSAY OF LIPASE: CPT | Performed by: INTERNAL MEDICINE

## 2020-05-12 PROCEDURE — 25000566 ZZH SEVOFLURANE, EA 15 MIN: Performed by: INTERNAL MEDICINE

## 2020-05-12 PROCEDURE — 25500064 ZZH RX 255 OP 636: Performed by: INTERNAL MEDICINE

## 2020-05-12 PROCEDURE — 25000128 H RX IP 250 OP 636: Performed by: ANESTHESIOLOGY

## 2020-05-12 PROCEDURE — 25000128 H RX IP 250 OP 636: Performed by: NURSE ANESTHETIST, CERTIFIED REGISTERED

## 2020-05-12 PROCEDURE — 71000014 ZZH RECOVERY PHASE 1 LEVEL 2 FIRST HR: Performed by: INTERNAL MEDICINE

## 2020-05-12 PROCEDURE — C1726 CATH, BAL DIL, NON-VASCULAR: HCPCS | Performed by: INTERNAL MEDICINE

## 2020-05-12 PROCEDURE — 25000125 ZZHC RX 250: Performed by: NURSE ANESTHETIST, CERTIFIED REGISTERED

## 2020-05-12 PROCEDURE — 25800030 ZZH RX IP 258 OP 636: Performed by: NURSE ANESTHETIST, CERTIFIED REGISTERED

## 2020-05-12 PROCEDURE — 82962 GLUCOSE BLOOD TEST: CPT

## 2020-05-12 PROCEDURE — 37000008 ZZH ANESTHESIA TECHNICAL FEE, 1ST 30 MIN: Performed by: INTERNAL MEDICINE

## 2020-05-12 PROCEDURE — 25000125 ZZHC RX 250: Performed by: INTERNAL MEDICINE

## 2020-05-12 PROCEDURE — 36415 COLL VENOUS BLD VENIPUNCTURE: CPT | Performed by: INTERNAL MEDICINE

## 2020-05-12 PROCEDURE — 71000027 ZZH RECOVERY PHASE 2 EACH 15 MINS: Performed by: INTERNAL MEDICINE

## 2020-05-12 PROCEDURE — 40000279 XR SURGERY CARM FLUORO GREATER THAN 5 MIN W STILLS: Mod: TC

## 2020-05-12 PROCEDURE — 82150 ASSAY OF AMYLASE: CPT | Performed by: INTERNAL MEDICINE

## 2020-05-12 PROCEDURE — C1769 GUIDE WIRE: HCPCS | Performed by: INTERNAL MEDICINE

## 2020-05-12 PROCEDURE — 36000061 ZZH SURGERY LEVEL 3 W FLUORO 1ST 30 MIN - UMMC: Performed by: INTERNAL MEDICINE

## 2020-05-12 PROCEDURE — 27210794 ZZH OR GENERAL SUPPLY STERILE: Performed by: INTERNAL MEDICINE

## 2020-05-12 PROCEDURE — 37000009 ZZH ANESTHESIA TECHNICAL FEE, EACH ADDTL 15 MIN: Performed by: INTERNAL MEDICINE

## 2020-05-12 PROCEDURE — 36000059 ZZH SURGERY LEVEL 3 EA 15 ADDTL MIN UMMC: Performed by: INTERNAL MEDICINE

## 2020-05-12 DEVICE — STENT JOHLIN PANCREA WEDGE 10FRX22CM W/INTRO G26828: Type: IMPLANTABLE DEVICE | Site: BILE DUCT | Status: FUNCTIONAL

## 2020-05-12 DEVICE — STENT JOHLIN PANCREA WEDGE 10FRX20CM W/INTRO G26827: Type: IMPLANTABLE DEVICE | Site: BILE DUCT | Status: FUNCTIONAL

## 2020-05-12 RX ORDER — HEPARIN SODIUM,PORCINE 10 UNIT/ML
5-10 VIAL (ML) INTRAVENOUS
Status: DISCONTINUED | OUTPATIENT
Start: 2020-05-12 | End: 2020-05-12 | Stop reason: HOSPADM

## 2020-05-12 RX ORDER — PROPOFOL 10 MG/ML
INJECTION, EMULSION INTRAVENOUS PRN
Status: DISCONTINUED | OUTPATIENT
Start: 2020-05-12 | End: 2020-05-12

## 2020-05-12 RX ORDER — NALOXONE HYDROCHLORIDE 0.4 MG/ML
.1-.4 INJECTION, SOLUTION INTRAMUSCULAR; INTRAVENOUS; SUBCUTANEOUS
Status: DISCONTINUED | OUTPATIENT
Start: 2020-05-12 | End: 2020-05-12 | Stop reason: HOSPADM

## 2020-05-12 RX ORDER — CIPROFLOXACIN 2 MG/ML
INJECTION, SOLUTION INTRAVENOUS PRN
Status: DISCONTINUED | OUTPATIENT
Start: 2020-05-12 | End: 2020-05-12

## 2020-05-12 RX ORDER — ONDANSETRON 4 MG/1
4 TABLET, ORALLY DISINTEGRATING ORAL EVERY 30 MIN PRN
Status: DISCONTINUED | OUTPATIENT
Start: 2020-05-12 | End: 2020-05-12 | Stop reason: HOSPADM

## 2020-05-12 RX ORDER — SODIUM CHLORIDE, SODIUM LACTATE, POTASSIUM CHLORIDE, CALCIUM CHLORIDE 600; 310; 30; 20 MG/100ML; MG/100ML; MG/100ML; MG/100ML
INJECTION, SOLUTION INTRAVENOUS CONTINUOUS
Status: DISCONTINUED | OUTPATIENT
Start: 2020-05-12 | End: 2020-05-12 | Stop reason: HOSPADM

## 2020-05-12 RX ORDER — ONDANSETRON 2 MG/ML
4 INJECTION INTRAMUSCULAR; INTRAVENOUS EVERY 30 MIN PRN
Status: DISCONTINUED | OUTPATIENT
Start: 2020-05-12 | End: 2020-05-12 | Stop reason: HOSPADM

## 2020-05-12 RX ORDER — LIDOCAINE 40 MG/G
CREAM TOPICAL
Status: DISCONTINUED | OUTPATIENT
Start: 2020-05-12 | End: 2020-05-12 | Stop reason: HOSPADM

## 2020-05-12 RX ORDER — FENTANYL CITRATE 50 UG/ML
25-50 INJECTION, SOLUTION INTRAMUSCULAR; INTRAVENOUS
Status: DISCONTINUED | OUTPATIENT
Start: 2020-05-12 | End: 2020-05-12 | Stop reason: HOSPADM

## 2020-05-12 RX ORDER — FENTANYL CITRATE 50 UG/ML
INJECTION, SOLUTION INTRAMUSCULAR; INTRAVENOUS PRN
Status: DISCONTINUED | OUTPATIENT
Start: 2020-05-12 | End: 2020-05-12

## 2020-05-12 RX ORDER — HEPARIN SODIUM (PORCINE) LOCK FLUSH IV SOLN 100 UNIT/ML 100 UNIT/ML
5 SOLUTION INTRAVENOUS
Status: DISCONTINUED | OUTPATIENT
Start: 2020-05-12 | End: 2020-05-12 | Stop reason: HOSPADM

## 2020-05-12 RX ORDER — LIDOCAINE HYDROCHLORIDE 20 MG/ML
INJECTION, SOLUTION INFILTRATION; PERINEURAL PRN
Status: DISCONTINUED | OUTPATIENT
Start: 2020-05-12 | End: 2020-05-12

## 2020-05-12 RX ORDER — ONDANSETRON 2 MG/ML
INJECTION INTRAMUSCULAR; INTRAVENOUS PRN
Status: DISCONTINUED | OUTPATIENT
Start: 2020-05-12 | End: 2020-05-12

## 2020-05-12 RX ORDER — INDOMETHACIN 50 MG/1
SUPPOSITORY RECTAL PRN
Status: DISCONTINUED | OUTPATIENT
Start: 2020-05-12 | End: 2020-05-12 | Stop reason: HOSPADM

## 2020-05-12 RX ORDER — IOPAMIDOL 510 MG/ML
INJECTION, SOLUTION INTRAVASCULAR PRN
Status: DISCONTINUED | OUTPATIENT
Start: 2020-05-12 | End: 2020-05-12 | Stop reason: HOSPADM

## 2020-05-12 RX ORDER — CIPROFLOXACIN 500 MG/1
500 TABLET, FILM COATED ORAL 2 TIMES DAILY
Qty: 6 TABLET | Refills: 0 | Status: SHIPPED | OUTPATIENT
Start: 2020-05-12 | End: 2020-05-15

## 2020-05-12 RX ORDER — INDOMETHACIN 50 MG/1
100 SUPPOSITORY RECTAL
Status: DISCONTINUED | OUTPATIENT
Start: 2020-05-12 | End: 2020-05-12 | Stop reason: HOSPADM

## 2020-05-12 RX ORDER — HEPARIN SODIUM,PORCINE 10 UNIT/ML
5-10 VIAL (ML) INTRAVENOUS EVERY 24 HOURS
Status: DISCONTINUED | OUTPATIENT
Start: 2020-05-12 | End: 2020-05-12 | Stop reason: HOSPADM

## 2020-05-12 RX ORDER — EPHEDRINE SULFATE 50 MG/ML
INJECTION, SOLUTION INTRAMUSCULAR; INTRAVENOUS; SUBCUTANEOUS PRN
Status: DISCONTINUED | OUTPATIENT
Start: 2020-05-12 | End: 2020-05-12

## 2020-05-12 RX ADMIN — PHENYLEPHRINE HYDROCHLORIDE 100 MCG: 10 INJECTION INTRAVENOUS at 10:01

## 2020-05-12 RX ADMIN — Medication 5 ML: at 12:43

## 2020-05-12 RX ADMIN — PROPOFOL 100 MG: 10 INJECTION, EMULSION INTRAVENOUS at 09:57

## 2020-05-12 RX ADMIN — CIPROFLOXACIN 400 MG: 2 INJECTION INTRAVENOUS at 10:05

## 2020-05-12 RX ADMIN — SODIUM CHLORIDE, POTASSIUM CHLORIDE, SODIUM LACTATE AND CALCIUM CHLORIDE: 600; 310; 30; 20 INJECTION, SOLUTION INTRAVENOUS at 09:50

## 2020-05-12 RX ADMIN — PHENYLEPHRINE HYDROCHLORIDE 200 MCG: 10 INJECTION INTRAVENOUS at 10:10

## 2020-05-12 RX ADMIN — PHENYLEPHRINE HYDROCHLORIDE 100 MCG: 10 INJECTION INTRAVENOUS at 09:58

## 2020-05-12 RX ADMIN — ONDANSETRON 4 MG: 2 INJECTION INTRAMUSCULAR; INTRAVENOUS at 11:00

## 2020-05-12 RX ADMIN — PHENYLEPHRINE HYDROCHLORIDE 100 MCG: 10 INJECTION INTRAVENOUS at 10:40

## 2020-05-12 RX ADMIN — PHENYLEPHRINE HYDROCHLORIDE 200 MCG: 10 INJECTION INTRAVENOUS at 10:13

## 2020-05-12 RX ADMIN — ROCURONIUM BROMIDE 50 MG: 10 INJECTION INTRAVENOUS at 09:58

## 2020-05-12 RX ADMIN — FENTANYL CITRATE 50 MCG: 50 INJECTION, SOLUTION INTRAMUSCULAR; INTRAVENOUS at 09:56

## 2020-05-12 RX ADMIN — PHENYLEPHRINE HYDROCHLORIDE 100 MCG: 10 INJECTION INTRAVENOUS at 10:07

## 2020-05-12 RX ADMIN — SUGAMMADEX 110 MG: 100 INJECTION, SOLUTION INTRAVENOUS at 11:02

## 2020-05-12 RX ADMIN — Medication 5 MG: at 10:41

## 2020-05-12 RX ADMIN — LIDOCAINE HYDROCHLORIDE 60 MG: 20 INJECTION, SOLUTION INFILTRATION; PERINEURAL at 09:56

## 2020-05-12 RX ADMIN — PHENYLEPHRINE HYDROCHLORIDE 200 MCG: 10 INJECTION INTRAVENOUS at 10:20

## 2020-05-12 ASSESSMENT — LIFESTYLE VARIABLES: TOBACCO_USE: 1

## 2020-05-12 ASSESSMENT — MIFFLIN-ST. JEOR: SCORE: 1180.75

## 2020-05-12 NOTE — ANESTHESIA CARE TRANSFER NOTE
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    Anesthesia Type:   General     Note:  Airway :Nasal Cannula  Patient transferred to:PACU  Handoff Report: Identifed the Patient, Identified the Reponsible Provider, Reviewed the pertinent medical history, Discussed the surgical course, Reviewed Intra-OP anesthesia mangement and issues during anesthesia, Set expectations for post-procedure period and Allowed opportunity for questions and acknowledgement of understanding      Vitals: (Last set prior to Anesthesia Care Transfer)    CRNA VITALS  5/12/2020 1056 - 5/12/2020 1132      5/12/2020             Pulse:  69    Ht Rate:  68    SpO2:  100 %    Resp Rate (observed):  10                Electronically Signed By: ARMAAN Nielsen CRNA  May 12, 2020  11:32 AM

## 2020-05-12 NOTE — ANESTHESIA POSTPROCEDURE EVALUATION
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     Anesthesia Type: General       Disposition: Outpatient   Postop Pain Control: Uneventful            Sign Out: Well controlled pain   PONV: No   Neuro/Psych: Uneventful            Sign Out: Acceptable/Baseline neuro status   Airway/Respiratory: Uneventful            Sign Out: Acceptable/Baseline resp. status   CV/Hemodynamics: Uneventful            Sign Out: Acceptable CV status   Other NRE: NONE   DID A NON-ROUTINE EVENT OCCUR? No         Last Anesthesia Record Vitals:  CRNA VITALS  2020 1056 - 2020 1156      2020             Resp Rate (observed):  15    EKG:  Sinus rhythm          Last PACU Vitals:  Vitals Value Taken Time   /61 2020 12:10 PM   Temp 36.4  C (97.5  F) 2020 11:30 AM   Pulse 57 2020 12:10 PM   Resp 8 2020 12:13 PM   SpO2 100 % 2020 12:13 PM   Temp src Available 2020 11:30 AM   NIBP     Pulse     SpO2     Resp     Temp     Ht Rate     Temp 2     Vitals shown include unvalidated device data.      Electronically Signed By: Prieto Chino DO, May 12, 2020, 12:14 PM

## 2020-05-12 NOTE — DISCHARGE INSTRUCTIONS
[facility], [place]  Same-Day Surgery   Adult Discharge Orders & Instructions   Take it easy when you get home.  Remember, same day surgery DOES NOT MEAN SAME DAY RECOVERY!  Healing is a gradual process.  You will need some time to recover - you may be more tired than you realize at first.  Rest and relax for at least the first 24 hours at home.  You'll feel better and heal faster if you take good care of yourself.  For 24 hours after surgery    1. Get plenty of rest.  A responsible adult must stay with you for at least 24 hours after you leave the hospital.   2. Do not drive or use heavy equipment.  If you have weakness or tingling, don't drive or use heavy equipment until this feeling goes away.  3. Do not drink alcohol.  4. Avoid strenuous or risky activities.  Ask for help when climbing stairs.   5. You may feel lightheaded.  IF so, sit for a few minutes before standing.  Have someone help you get up.   6. If you have nausea (feel sick to your stomach): Drink only clear liquids such as apple juice, ginger ale, broth or 7-Up.  Rest may also help.  Be sure to drink enough fluids.  Move to a regular diet as you feel able.  7. You may have a slight fever. Call the doctor if your fever is over 100 F (37.7 C) (taken under the tongue) or lasts longer than 24 hours.  8. You may have a dry mouth, a sore throat, muscle aches or trouble sleeping.  These should go away after 24 hours.  9. Do not make important or legal decisions.   Call your doctor for any of the followin.  Signs of infection (fever, growing tenderness at the surgery site, a large amount of drainage or bleeding, severe pain, foul-smelling drainage, redness, swelling).    2. It has been over 8 to 10 hours since surgery and you are still not able to urinate (pass water).    3.  Headache for over 24 hours.    To contact a doctor, call Dr Barraza at [phone] or:        [phone] and ask for the resident on call for  gastroenterology    (answered 24 hours a day)      Emergency Department:[facility]: [phone]       (TTY for hearing impaired: [phone])

## 2020-05-12 NOTE — ANESTHESIA PREPROCEDURE EVALUATION
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]   Procedure(s):  ENDOSCOPIC RETROGRADE CHOLANGIOPANCREATOGRAPHY     LABS:  CBC:   Lab Results   Component Value Date    WBC 6.4 2020    WBC 8.2 2020    HGB 11.6 (L) 2020    HGB 11.1 (L) 2020    HCT 34.4 (L) 2020    HCT 33.2 (L) 2020     2020     2020     BMP:   Lab Results   Component Value Date     2020     2020    POTASSIUM 3.8 2020    POTASSIUM 4.0 2020    CHLORIDE 107 2020    CHLORIDE 105 2020    CO2 24 2020    CO2 22 2020    BUN 17 2020    BUN 15 2020    CR 0.87 2020    CR 0.82 2020     (H) 2020     (H) 2020     COAGS: No results found for: PTT, INR, FIBR  POC:   Lab Results   Component Value Date    BGM 94 2020     OTHER:   Lab Results   Component Value Date    LACT 1.2 2020    LUZMARIA 8.8 2020    ALBUMIN 2.4 (L) 2020    PROTTOTAL 6.2 (L) 2020     (H) 2020     (H) 2020    ALKPHOS 908 (H) 2020    BILITOTAL 3.4 (H) 2020    LIPASE 77 2020    AMYLASE 53 2020    TSH 2.61 2018        Preop Vitals    BP Readings from Last 3 Encounters:   20 (!) 140/66   20 130/77   19 137/77    Pulse Readings from Last 3 Encounters:   20 84   20 58   19 58      Resp Readings from Last 3 Encounters:   20 16   20 14   19 16    SpO2 Readings from Last 3 Encounters:   20 98%   20 100%   19 100%      Temp Readings from Last 1 Encounters:   20 36.3  C (97.3  F) (Oral)    Ht Readings from Last 1 Encounters:   20 1.676 m (5' 6\")      Wt Readings from Last 1 Encounters:   20 59.7 kg (131 lb 9.6 oz)    " "Estimated body mass index is 21.24 kg/m  as calculated from the following:    Height as of 2/27/20: 1.676 m (5' 6\").    Weight as of 2/28/20: 59.7 kg (131 lb 9.6 oz).     LDA:  Peripheral IV 02/25/20 Right;Posterior;Medial Lower forearm (Active)   Number of days: 77        Past Medical History:   Diagnosis Date     CKD (chronic kidney disease)      GERD (gastroesophageal reflux disease)      Hypertension       Past Surgical History:   Procedure Laterality Date     CHOLECYSTECTOMY       ENDOSCOPIC RETROGRADE CHOLANGIOPANCREATOGRAM N/A 10/9/2018    Procedure: COMBINED ENDOSCOPIC RETROGRADE CHOLANGIOPANCREATOGRAPHY, PLACE TUBE/STENT;;  Surgeon: Bravo Barraza MD;  Location: [facility]     ENDOSCOPIC RETROGRADE CHOLANGIOPANCREATOGRAM N/A 2/27/2020    Procedure: ENDOSCOPIC RETROGRADE CHOLANGIOPANCREATOGRAPHY, stent exchange, stent placement;  Surgeon: Paramjit Bella MD;  Location: [facility]     ENDOSCOPIC RETROGRADE CHOLANGIOPANCREATOGRAM WITH SPYGLASS N/A 11/5/2018    Procedure: Endoscopic retrograde cholangiopancreatography with spyglass cholangioscopy, biliary biopsies, brushings, and stent exchange;  Surgeon: Bravo Barraza MD;  Location: [facility]     ENDOSCOPIC RETROGRADE CHOLANGIOPANCREATOGRAM WITH SPYGLASS N/A 1/22/2019    Procedure: Endoscopic Retrograde Cholangiopancreatogram with spyglass , bile duct stents exghanged, balloon sweep of bile duct for stones;  Surgeon: Bravo Barraza MD;  Location: [facility]     ENDOSCOPIC RETROGRADE CHOLANGIOPANCREATOGRAPHY, EXCHANGE TUBE/STENT N/A 6/7/2019    Procedure: Endoscopic Retrograde Cholangiopancreatogram with biliary stent exchange and stone removal;  Surgeon: Bravo Barraza MD;  Location: [facility]     ENDOSCOPIC RETROGRADE CHOLANGIOPANCREATOGRAPHY, EXCHANGE TUBE/STENT N/A 9/9/2019    Procedure: Endoscopic Retrograde Cholangiopancreatogram with biliary stent exchange and stone removal;  Surgeon: Bravo Barraza MD;  Location:  OR     " ENDOSCOPIC RETROGRADE CHOLANGIOPANCREATOGRAPHY, EXCHANGE TUBE/STENT N/A 11/25/2019    Procedure: Endoscopic Retrograde Cholangiopancreatogram with biliary stent exchange;  Surgeon: Bravo Barraza MD;  Location: [facility]     ENDOSCOPIC RETROGRADE CHOLANGIOPANCREATOGRAPHY, EXCHANGE TUBE/STENT N/A 2/24/2020    Procedure: ENDOSCOPIC RETROGRADE CHOLANGIOPANCREATOGRAPHY WITH BILE DUCT STENT EXCHANGE, STONE EXTRACTION AND DILATION;  Surgeon: Bravo Barraza MD;  Location: [facility]     ENDOSCOPIC ULTRASOUND UPPER GASTROINTESTINAL TRACT (GI) N/A 10/9/2018    Procedure: ENDOSCOPIC ULTRASOUND, ESOPHAGOSCOPY / UPPER GASTROINTESTINAL TRACT (GI);   Endoscopic Retrograde Cholangiopancreatogram, pancreatic stent placement, biliary sphincterotomy, cholangioscopy, brushings and biopsies, biliary stent placement;  Surgeon: Bravo Barraza MD;  Location: [facility]     ESOPHAGOSCOPY, GASTROSCOPY, DUODENOSCOPY (EGD), COMBINED N/A 11/5/2018    Procedure: Upper Endoscopic Ultrasound with fine needle biopsy, ;  Surgeon: Bravo Barraza MD;  Location: [facility]     ESOPHAGOSCOPY, GASTROSCOPY, DUODENOSCOPY (EGD), COMBINED N/A 1/22/2019    Procedure: Upper Endoscopic Ultrasound with fine needle aspiration of bile duct mass;  Surgeon: Rober Bernal MD;  Location: [facility]     HERNIA REPAIR       PROSTATE SURGERY        Allergies   Allergen Reactions     Hydralazine Other (See Comments)     Irregular heart rate  Tingling in hands and feet  Sore throat     Penicillin G      Strawberries [Strawberry]      Sulfa Drugs      Isosorbide Other (See Comments)        Anesthesia Evaluation     . Pt has had prior anesthetic. Type: General    No history of anesthetic complications          ROS/MED HX    ENT/Pulmonary:  - neg pulmonary ROS   (+)tobacco use, Past use 0.5 packs/day  , . .    Neurologic: Comment: B/L hearing impairment  - neg neurologic ROS     Cardiovascular:     (+) hypertension----. : . . . :. . Previous cardiac testing  date:results:date: results:ECG reviewed date:5/26/19 results:Sinus  PVCs  Left axis date: results:          METS/Exercise Tolerance:  4 - Raking leaves, gardening   Hematologic:  - neg hematologic  ROS       Musculoskeletal:   (+) arthritis,  -       GI/Hepatic:     (+) GERD Asymptomatic on medication, Other GI/Hepatic cholangiocarcinoma      Renal/Genitourinary:     (+) chronic renal disease, type: CRI, Pt does not require dialysis, Pt has no history of transplant, BPH,       Endo:  - neg endo ROS       Psychiatric:  - neg psychiatric ROS       Infectious Disease:  - neg infectious disease ROS       Malignancy:   (+) Malignancy History of GI and Skin  GI CA  Active status post Chemo, Skin CA Remission status post Surgery, 6/3/19 CT: 1.  Small amount of free fluid in the pelvis. Otherwise stable exam. Stable pulmonary nodule.  2.  Stable biliary dilatation. Normal caliber bowel. No free air. Bilateral renal cysts are stable. Stable lymphadenopathy.        Other:    (+) H/O Chronic Pain,                       PHYSICAL EXAM:   Mental Status/Neuro: A/A/O   Airway: Facies: Feasible  Mallampati: I  Mouth/Opening: Full  TM distance: > 6 cm  Neck ROM: Full   Respiratory: Auscultation: CTAB     Resp. Rate: Normal     Resp. Effort: Normal      CV: Rhythm: Regular  Rate: Age appropriate  Heart: Normal Sounds  Edema: None   Comments:                      Assessment:   ASA SCORE: 2    H&P: History and physical reviewed and following examination; no interval change.   Smoking Status:  Non-Smoker/Unknown   NPO Status: NPO Appropriate     Plan:   Anes. Type:  General   Pre-Medication: None   Induction:  IV (Standard)   Airway: ETT; Oral   Access/Monitoring: PIV   Maintenance: Balanced     Postop Plan:   Postop Pain: Opioids  Postop Sedation/Airway: Not planned  Disposition: Outpatient     PONV Management:   Adult Risk Factors:, Non-Smoker, Postop Opioids   Prevention: Ondansetron, Dexamethasone     CONSENT: Direct conversation    Plan and risks discussed with: Patient   Blood Products: Consented (ALL Blood Products)                   Prieto Chino DO

## 2020-05-12 NOTE — OP NOTE
ERCP  05/12/2020  9:49 AM  [facility], [address] [place] [address] [phone]     Endoscopy Department   _______________________________________________________________________________   Patient Name: Tim Cazares         Procedure Date: 5/12/2020 9:49 AM   MRN: [id number]                       Account Number: [id number]   YOB: 1935               Admit Type: Outpatient   Age: 85                               Room: OR   Gender: Male                          Note Status: Finalized   Attending MD: Bravo Barraza MD   Total Sedation Time:   _______________________________________________________________________________       Procedure:           ERCP   Indications:         Malignant liver duct tumor, Bile duct stricture   Providers:           Bravo Barraza MD   Patient Profile:     Mr Cazares is an 86yo gentleman with biopsy proven                        cholangiocarcinoma whose chemotherapy has been paused                        indefinitely and returns on schedule for exchange of the                        biliary stents. He notes feeling well since exchange to                        three rather than two 10F stents, one to each of the                        right anterior, right posterior and left.   Referring MD:        Jesus Alberto Nova MD   Requesting Provider: Harshal Coyle MD   Medicines:           General Anesthesia, Indomethacin 100 mg AL, Cipro 400 mg                        IV   Complications:       No immediate complications.   _______________________________________________________________________________   Procedure:           Pre-Anesthesia Assessment:                        - Prior to the procedure, a History and Physical was                        performed, and patient medications and allergies were                        reviewed. The patient is competent. The risks and                        benefits of the procedure and the sedation  options and                        risks were discussed with the patient. All questions                        were answered and informed consent was obtained. Patient                        identification and proposed procedure were verified by                        the nurse in the pre-procedure area. Mental Status                        Examination: alert and oriented. Airway Examination:                        Mallampati Class II (the uvula but not tonsillar pillars                        visualized). Respiratory Examination: clear to                        auscultation. CV Examination: normal. ASA Grade                        Assessment: II - A patient with mild systemic disease.                        After reviewing the risks and benefits, the patient was                        deemed in satisfactory condition to undergo the                        procedure. The anesthesia plan was to use general                        anesthesia. Immediately prior to administration of                        medications, the patient was re-assessed for adequacy to                        receive sedatives. The heart rate, respiratory rate,                        oxygen saturations, blood pressure, adequacy of                        pulmonary ventilation, and response to care were                        monitored throughout the procedure. The physical status                        of the patient was re-assessed after the procedure.                        After obtaining informed consent, the scope was passed                        under direct vision. Throughout the procedure, the                        patient's blood pressure, pulse, and oxygen saturations                        were monitored continuously. The duodenoscope was                        introduced through the mouth, and used to inject                        contrast into and used to inject contrast into the bile                        duct. The ERCP was  "accomplished without difficulty. The                        patient tolerated the procedure well.                                                                                     Findings:        A  film of the right upper quadrant demonstrated three biliary        stents in their anticipated positions. Limited white light views were        remarkable for the three biliary stents appearing partially occluded        across a patent biliary sphincterotomy. The three stents were removed        from the biliary tree using a snare. The bile duct was selectively        deeply cannulated with the short-nosed traction sphincterotome in        concert with multiple 0.025\" Visiglide wires. Contrast was injected and        I personally interpreted the bile duct images. Ductal flow of contrast        was adequate, image quality was excellent and contrast extended        throughout intrahepatic ducts. As previous there were complex stenoses        of the bifurcation and each of the primary left, right anterior and        right posterior insertions. The bifurcation and right anterior stenoses        were dilated to 4mm. Drainage was then excellent of this sector. A 10 Fr        by 18 cm transpapillary Johlin stent with no external flaps and no        internal flaps was placed 17 cm into a left intrahepatic duct. A 10 Fr        by 15 cm transpapillary Johlin stent with no external flaps and no        internal flaps was placed 14 cm into a right posterior. A 10 Fr by 13 cm        transpapillary Johlin stent with no external flaps and no internal flaps        was placed 12 cm into a right anterior. Bile flowed through the stents        and the stents were in good position. The ventral pancreatic duct and        orifice were selectively not interrogated.                                                                                     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                        insertion) now with mild signficant upstream dilation                        - Successful passage dilation of bifurcaton and right                        anterior stenoses                        - Successful replacement of three 10F Johlin stents deep                        to the right anterior, right posterior and left                        intrahepatics   Recommendation:      - General anesthesia recovery with probable discharge                        home this afternoon                        - Repeat ERCP in 10 weeks for repeat stent exchange                        - Complete the short course of antibiotic as prescribed                        - The findings and recommendations were discussed with                        the patient and their family.                                                                                       electronically signed by DAISY Barraza

## 2020-05-15 ENCOUNTER — PREP FOR PROCEDURE (OUTPATIENT)
Dept: GASTROENTEROLOGY | Facility: CLINIC | Age: 85
End: 2020-05-15

## 2020-05-15 ENCOUNTER — PATIENT OUTREACH (OUTPATIENT)
Dept: GASTROENTEROLOGY | Facility: CLINIC | Age: 85
End: 2020-05-15

## 2020-05-15 DIAGNOSIS — K83.1 BILIARY STRICTURE (H): Primary | ICD-10-CM

## 2020-05-15 NOTE — TELEPHONE ENCOUNTER
Post ERCP (5/12/2020) with Dr. Barraza: Follow-up    Post procedure recommendations:   Repeat ERCP in 10 weeks for repeat stent exchange    - Complete the short course of antibiotic as prescribed    - The findings and recommendations were discussed with the patient and their family.     Orders placed:   Please assist in scheduling:     Procedure/Imaging/Clinic: ERCP  Physician: Dr. Barraza  Timing: 10 weeks  Procedure length:75 minutes  Anesthesia:general  Dx: biliary stricture  Tier:2  Location: [facility]     Patient states: feeling ok, no concerning symptoms      Clinic contact and scheduling numbers verified for future questions/concerns.    Valencia Tran, RN Care Coordinator

## 2020-06-17 ENCOUNTER — HOSPITAL ENCOUNTER (OUTPATIENT)
Facility: CLINIC | Age: 85
End: 2020-06-17
Attending: INTERNAL MEDICINE | Admitting: INTERNAL MEDICINE
Payer: MEDICARE

## 2020-06-17 DIAGNOSIS — K83.1 BILIARY STRICTURE (H): ICD-10-CM

## 2020-06-17 DIAGNOSIS — Z11.59 ENCOUNTER FOR SCREENING FOR OTHER VIRAL DISEASES: Primary | ICD-10-CM

## 2021-05-06 NOTE — PROGRESS NOTES
Received fax request to send over records to [facility].     Faxed recent procedures and pathology to [phone].     MELY [address] Dr[address] Dr. Barraza, & Dr. Agosto  Advanced Endoscopy  [phone]    
Opt out

## (undated) DEVICE — KIT ENDO FIRST STEP DISINFECTANT 200ML W/POUCH EP-4

## (undated) DEVICE — SUCTION MANIFOLD DORNOCH ULTRA CART UL-CL500

## (undated) DEVICE — ENDO TUBING CO2 SMARTCAP STERILE DISP 100145CO2EXT

## (undated) DEVICE — BIOPSY VALVE BIOSHIELD 00711135

## (undated) DEVICE — KIT CONNECTOR FOR OLYMPUS ENDOSCOPES DEFENDO 100310

## (undated) DEVICE — ENDO FUSION OMNI-TOME G31903

## (undated) DEVICE — ENDO BITE BLOCK ADULT OMNI-BLOC

## (undated) DEVICE — SOL WATER IRRIG 1000ML BOTTLE 2F7114

## (undated) DEVICE — ENDO PROBE COVER ULTRASOUND BALLOON LATEX  MAJ-249

## (undated) DEVICE — PACK ENDOSCOPY GI CUSTOM UMMC

## (undated) DEVICE — ENDO CAP AND TUBING STERILE FOR ENDOGATOR  100130

## (undated) DEVICE — ENDO DEVICE LOCKING AND BIOPSY CAP M00545261

## (undated) DEVICE — SPHINCTEROTOME 5.5FRX25MM OMNI-TOME FS-OMNI-35 G31911

## (undated) DEVICE — Device

## (undated) DEVICE — CATH RETRIEVAL BALLOON EXTRACTOR PRO RX-S INJ ABOVE 9-12MM

## (undated) DEVICE — CATH SPYSCOPE DIGITAL ACCESS DS DISP M00546600

## (undated) DEVICE — PAD CHUX UNDERPAD 23X24" 7136

## (undated) DEVICE — ENDO BITE BLOCK PED

## (undated) DEVICE — FCP BIOPSY SPYBITE 1.0MMX266CM 4627

## (undated) DEVICE — TUBING SUCTION 10'X3/16" N510

## (undated) DEVICE — WIRE GUIDE 0.025"X270CM ANG VISIGLIDE G-240-2527A

## (undated) DEVICE — WIRE GUIDE 0.025"X270CM STR VISIGLIDE G-240-2527S

## (undated) DEVICE — ENDO CATH BALLOON EXTRACTOR PRO RX 09-12MM 4700

## (undated) DEVICE — ENDO SNARE POLYPECTOMY OVAL 15MM LOOP SD-240U-15

## (undated) DEVICE — TAPE MEASURE PAPER 36" LF NI14-1300

## (undated) DEVICE — LABEL MEDICATION SYSTEM 3303-P

## (undated) DEVICE — SPECIMEN CONTAINER 3OZ W/FORMALIN 59901

## (undated) DEVICE — SOL NACL 0.9% IRRIG 1000ML BOTTLE 2F7124

## (undated) DEVICE — WIRE GUIDE 0.025"X270CM SHORT ANG VISIGLIDE 2 G-260-2527A

## (undated) DEVICE — ENDO NDL ASPIRATION ULTRASOUND 25GA ACQUIRE M00555560

## (undated) DEVICE — ENDO NDL ASPIRATION ULTRASOUND 22GA ACQUIRE M00555540

## (undated) DEVICE — INTR ENDOSCOPIC STENT FUSION OASIS 09.0FRX200CM

## (undated) DEVICE — TAPE CLOTH 3" CARDINAL 3TRCL03

## (undated) DEVICE — BALLOON EXTRACTION 15X1950MM 3.2MM TL B-V243Q-A

## (undated) DEVICE — ENDO CATH BALLOON DILATION HURRICANE 04MMX4X180CM M00545900

## (undated) DEVICE — WIRE GUIDE 0.025"X270CM SHORT STR VISIGLIDE 2 G-260-2527S

## (undated) DEVICE — WIRE GUIDE 0.025"X450CM ANG VISIGLIDE G-240-2545A

## (undated) DEVICE — INFLATION DEVICE BIG 60 ENDO-AN6012

## (undated) DEVICE — WIRE GUIDE 0.025"X450CM STR VISIGLIDE G-240-2545S

## (undated) DEVICE — CANNULA BILIARY ERCP .035" TAPER TIP 3.2MM CHANNEL

## (undated) RX ORDER — FENTANYL CITRATE 50 UG/ML
INJECTION, SOLUTION INTRAMUSCULAR; INTRAVENOUS
Status: DISPENSED
Start: 2019-01-22

## (undated) RX ORDER — ONDANSETRON 2 MG/ML
INJECTION INTRAMUSCULAR; INTRAVENOUS
Status: DISPENSED
Start: 2018-11-05

## (undated) RX ORDER — EPHEDRINE SULFATE 50 MG/ML
INJECTION, SOLUTION INTRAMUSCULAR; INTRAVENOUS; SUBCUTANEOUS
Status: DISPENSED
Start: 2020-02-24

## (undated) RX ORDER — ONDANSETRON 2 MG/ML
INJECTION INTRAMUSCULAR; INTRAVENOUS
Status: DISPENSED
Start: 2019-01-22

## (undated) RX ORDER — ONDANSETRON 2 MG/ML
INJECTION INTRAMUSCULAR; INTRAVENOUS
Status: DISPENSED
Start: 2020-02-24

## (undated) RX ORDER — SIMETHICONE 40MG/0.6ML
SUSPENSION, DROPS(FINAL DOSAGE FORM)(ML) ORAL
Status: DISPENSED
Start: 2018-11-05

## (undated) RX ORDER — GLYCOPYRROLATE 0.2 MG/ML
INJECTION, SOLUTION INTRAMUSCULAR; INTRAVENOUS
Status: DISPENSED
Start: 2019-06-07

## (undated) RX ORDER — PROPOFOL 10 MG/ML
INJECTION, EMULSION INTRAVENOUS
Status: DISPENSED
Start: 2019-06-07

## (undated) RX ORDER — LIDOCAINE HYDROCHLORIDE 20 MG/ML
INJECTION, SOLUTION EPIDURAL; INFILTRATION; INTRACAUDAL; PERINEURAL
Status: DISPENSED
Start: 2020-02-27

## (undated) RX ORDER — FENTANYL CITRATE 50 UG/ML
INJECTION, SOLUTION INTRAMUSCULAR; INTRAVENOUS
Status: DISPENSED
Start: 2018-11-05

## (undated) RX ORDER — INDOMETHACIN 50 MG/1
SUPPOSITORY RECTAL
Status: DISPENSED
Start: 2018-11-05

## (undated) RX ORDER — FENTANYL CITRATE 50 UG/ML
INJECTION, SOLUTION INTRAMUSCULAR; INTRAVENOUS
Status: DISPENSED
Start: 2020-02-27

## (undated) RX ORDER — PROPOFOL 10 MG/ML
INJECTION, EMULSION INTRAVENOUS
Status: DISPENSED
Start: 2018-10-09

## (undated) RX ORDER — LIDOCAINE HYDROCHLORIDE 20 MG/ML
INJECTION, SOLUTION EPIDURAL; INFILTRATION; INTRACAUDAL; PERINEURAL
Status: DISPENSED
Start: 2020-05-12

## (undated) RX ORDER — ONDANSETRON 2 MG/ML
INJECTION INTRAMUSCULAR; INTRAVENOUS
Status: DISPENSED
Start: 2019-11-25

## (undated) RX ORDER — PROPOFOL 10 MG/ML
INJECTION, EMULSION INTRAVENOUS
Status: DISPENSED
Start: 2020-02-24

## (undated) RX ORDER — LIDOCAINE HYDROCHLORIDE 20 MG/ML
INJECTION, SOLUTION EPIDURAL; INFILTRATION; INTRACAUDAL; PERINEURAL
Status: DISPENSED
Start: 2019-01-22

## (undated) RX ORDER — INDOMETHACIN 50 MG/1
SUPPOSITORY RECTAL
Status: DISPENSED
Start: 2019-06-07

## (undated) RX ORDER — PROPOFOL 10 MG/ML
INJECTION, EMULSION INTRAVENOUS
Status: DISPENSED
Start: 2019-01-22

## (undated) RX ORDER — PHENYLEPHRINE HCL IN 0.9% NACL 1 MG/10 ML
SYRINGE (ML) INTRAVENOUS
Status: DISPENSED
Start: 2019-06-07

## (undated) RX ORDER — FENTANYL CITRATE 50 UG/ML
INJECTION, SOLUTION INTRAMUSCULAR; INTRAVENOUS
Status: DISPENSED
Start: 2019-06-07

## (undated) RX ORDER — FENTANYL CITRATE 50 UG/ML
INJECTION, SOLUTION INTRAMUSCULAR; INTRAVENOUS
Status: DISPENSED
Start: 2020-02-24

## (undated) RX ORDER — GLYCOPYRROLATE 0.2 MG/ML
INJECTION, SOLUTION INTRAMUSCULAR; INTRAVENOUS
Status: DISPENSED
Start: 2018-11-05

## (undated) RX ORDER — FENTANYL CITRATE 50 UG/ML
INJECTION, SOLUTION INTRAMUSCULAR; INTRAVENOUS
Status: DISPENSED
Start: 2019-09-09

## (undated) RX ORDER — ONDANSETRON 2 MG/ML
INJECTION INTRAMUSCULAR; INTRAVENOUS
Status: DISPENSED
Start: 2020-02-27

## (undated) RX ORDER — EPHEDRINE SULFATE 50 MG/ML
INJECTION, SOLUTION INTRAMUSCULAR; INTRAVENOUS; SUBCUTANEOUS
Status: DISPENSED
Start: 2020-05-12

## (undated) RX ORDER — IOPAMIDOL 510 MG/ML
INJECTION, SOLUTION INTRAVASCULAR
Status: DISPENSED
Start: 2018-10-09

## (undated) RX ORDER — FENTANYL CITRATE 50 UG/ML
INJECTION, SOLUTION INTRAMUSCULAR; INTRAVENOUS
Status: DISPENSED
Start: 2020-05-12

## (undated) RX ORDER — LIDOCAINE HYDROCHLORIDE 20 MG/ML
INJECTION, SOLUTION EPIDURAL; INFILTRATION; INTRACAUDAL; PERINEURAL
Status: DISPENSED
Start: 2018-11-05

## (undated) RX ORDER — INDOMETHACIN 50 MG/1
SUPPOSITORY RECTAL
Status: DISPENSED
Start: 2019-01-22

## (undated) RX ORDER — ONDANSETRON 2 MG/ML
INJECTION INTRAMUSCULAR; INTRAVENOUS
Status: DISPENSED
Start: 2018-10-09

## (undated) RX ORDER — DEXAMETHASONE SODIUM PHOSPHATE 4 MG/ML
INJECTION, SOLUTION INTRA-ARTICULAR; INTRALESIONAL; INTRAMUSCULAR; INTRAVENOUS; SOFT TISSUE
Status: DISPENSED
Start: 2019-06-07

## (undated) RX ORDER — EPHEDRINE SULFATE 50 MG/ML
INJECTION, SOLUTION INTRAMUSCULAR; INTRAVENOUS; SUBCUTANEOUS
Status: DISPENSED
Start: 2018-11-05

## (undated) RX ORDER — IOPAMIDOL 510 MG/ML
INJECTION, SOLUTION INTRAVASCULAR
Status: DISPENSED
Start: 2019-06-07

## (undated) RX ORDER — CIPROFLOXACIN 2 MG/ML
INJECTION, SOLUTION INTRAVENOUS
Status: DISPENSED
Start: 2020-02-24

## (undated) RX ORDER — DEXAMETHASONE SODIUM PHOSPHATE 4 MG/ML
INJECTION, SOLUTION INTRA-ARTICULAR; INTRALESIONAL; INTRAMUSCULAR; INTRAVENOUS; SOFT TISSUE
Status: DISPENSED
Start: 2019-01-22

## (undated) RX ORDER — FENTANYL CITRATE 50 UG/ML
INJECTION, SOLUTION INTRAMUSCULAR; INTRAVENOUS
Status: DISPENSED
Start: 2019-11-25

## (undated) RX ORDER — LIDOCAINE HYDROCHLORIDE 20 MG/ML
INJECTION, SOLUTION EPIDURAL; INFILTRATION; INTRACAUDAL; PERINEURAL
Status: DISPENSED
Start: 2019-11-25

## (undated) RX ORDER — ONDANSETRON 2 MG/ML
INJECTION INTRAMUSCULAR; INTRAVENOUS
Status: DISPENSED
Start: 2020-05-12

## (undated) RX ORDER — LIDOCAINE HYDROCHLORIDE 20 MG/ML
INJECTION, SOLUTION EPIDURAL; INFILTRATION; INTRACAUDAL; PERINEURAL
Status: DISPENSED
Start: 2019-06-07

## (undated) RX ORDER — PHENYLEPHRINE HCL IN 0.9% NACL 1 MG/10 ML
SYRINGE (ML) INTRAVENOUS
Status: DISPENSED
Start: 2020-05-12

## (undated) RX ORDER — ONDANSETRON 2 MG/ML
INJECTION INTRAMUSCULAR; INTRAVENOUS
Status: DISPENSED
Start: 2019-06-07

## (undated) RX ORDER — IOPAMIDOL 510 MG/ML
INJECTION, SOLUTION INTRAVASCULAR
Status: DISPENSED
Start: 2018-11-05

## (undated) RX ORDER — HEPARIN SODIUM,PORCINE 10 UNIT/ML
VIAL (ML) INTRAVENOUS
Status: DISPENSED
Start: 2020-05-12

## (undated) RX ORDER — DEXAMETHASONE SODIUM PHOSPHATE 4 MG/ML
INJECTION, SOLUTION INTRA-ARTICULAR; INTRALESIONAL; INTRAMUSCULAR; INTRAVENOUS; SOFT TISSUE
Status: DISPENSED
Start: 2018-11-05

## (undated) RX ORDER — LIDOCAINE HYDROCHLORIDE 20 MG/ML
INJECTION, SOLUTION EPIDURAL; INFILTRATION; INTRACAUDAL; PERINEURAL
Status: DISPENSED
Start: 2018-10-09

## (undated) RX ORDER — PROPOFOL 10 MG/ML
INJECTION, EMULSION INTRAVENOUS
Status: DISPENSED
Start: 2019-11-25

## (undated) RX ORDER — DEXAMETHASONE SODIUM PHOSPHATE 4 MG/ML
INJECTION, SOLUTION INTRA-ARTICULAR; INTRALESIONAL; INTRAMUSCULAR; INTRAVENOUS; SOFT TISSUE
Status: DISPENSED
Start: 2019-11-25

## (undated) RX ORDER — LIDOCAINE HYDROCHLORIDE 20 MG/ML
INJECTION, SOLUTION EPIDURAL; INFILTRATION; INTRACAUDAL; PERINEURAL
Status: DISPENSED
Start: 2020-02-24

## (undated) RX ORDER — EPHEDRINE SULFATE 50 MG/ML
INJECTION, SOLUTION INTRAMUSCULAR; INTRAVENOUS; SUBCUTANEOUS
Status: DISPENSED
Start: 2019-06-07

## (undated) RX ORDER — PROPOFOL 10 MG/ML
INJECTION, EMULSION INTRAVENOUS
Status: DISPENSED
Start: 2020-05-12

## (undated) RX ORDER — PHENYLEPHRINE HCL IN 0.9% NACL 1 MG/10 ML
SYRINGE (ML) INTRAVENOUS
Status: DISPENSED
Start: 2020-02-24

## (undated) RX ORDER — SIMETHICONE 40MG/0.6ML
SUSPENSION, DROPS(FINAL DOSAGE FORM)(ML) ORAL
Status: DISPENSED
Start: 2019-06-07

## (undated) RX ORDER — PROPOFOL 10 MG/ML
INJECTION, EMULSION INTRAVENOUS
Status: DISPENSED
Start: 2018-11-05

## (undated) RX ORDER — ESMOLOL HYDROCHLORIDE 10 MG/ML
INJECTION INTRAVENOUS
Status: DISPENSED
Start: 2020-02-27

## (undated) RX ORDER — CIPROFLOXACIN 2 MG/ML
INJECTION, SOLUTION INTRAVENOUS
Status: DISPENSED
Start: 2018-11-05

## (undated) RX ORDER — FENTANYL CITRATE 50 UG/ML
INJECTION, SOLUTION INTRAMUSCULAR; INTRAVENOUS
Status: DISPENSED
Start: 2018-10-09